# Patient Record
Sex: FEMALE | Race: WHITE | Employment: OTHER | URBAN - METROPOLITAN AREA
[De-identification: names, ages, dates, MRNs, and addresses within clinical notes are randomized per-mention and may not be internally consistent; named-entity substitution may affect disease eponyms.]

---

## 2017-01-18 ENCOUNTER — TELEPHONE (OUTPATIENT)
Dept: FAMILY MEDICINE CLINIC | Age: 77
End: 2017-01-18

## 2017-01-18 ENCOUNTER — CARE COORDINATION (OUTPATIENT)
Dept: CARE COORDINATION | Age: 77
End: 2017-01-18

## 2017-01-18 DIAGNOSIS — Z79.4 TYPE 2 DIABETES MELLITUS WITH OTHER NEUROLOGIC COMPLICATION, WITH LONG-TERM CURRENT USE OF INSULIN (HCC): Primary | ICD-10-CM

## 2017-01-18 DIAGNOSIS — E11.49 TYPE 2 DIABETES MELLITUS WITH OTHER NEUROLOGIC COMPLICATION, WITH LONG-TERM CURRENT USE OF INSULIN (HCC): Primary | ICD-10-CM

## 2017-01-31 DIAGNOSIS — E11.49 TYPE 2 DIABETES MELLITUS WITH OTHER DIABETIC NEUROLOGICAL COMPLICATION (HCC): ICD-10-CM

## 2017-02-16 ENCOUNTER — CARE COORDINATION (OUTPATIENT)
Dept: CARE COORDINATION | Age: 77
End: 2017-02-16

## 2017-02-21 ENCOUNTER — TELEPHONE (OUTPATIENT)
Dept: FAMILY MEDICINE CLINIC | Age: 77
End: 2017-02-21

## 2017-03-02 ENCOUNTER — OFFICE VISIT (OUTPATIENT)
Dept: FAMILY MEDICINE CLINIC | Age: 77
End: 2017-03-02

## 2017-03-02 VITALS
HEIGHT: 64 IN | DIASTOLIC BLOOD PRESSURE: 76 MMHG | WEIGHT: 220.8 LBS | RESPIRATION RATE: 16 BRPM | SYSTOLIC BLOOD PRESSURE: 136 MMHG | OXYGEN SATURATION: 96 % | HEART RATE: 76 BPM | BODY MASS INDEX: 37.69 KG/M2

## 2017-03-02 DIAGNOSIS — Z51.81 MEDICATION MONITORING ENCOUNTER: ICD-10-CM

## 2017-03-02 DIAGNOSIS — Z79.4 TYPE 2 DIABETES MELLITUS WITH OTHER NEUROLOGIC COMPLICATION, WITH LONG-TERM CURRENT USE OF INSULIN (HCC): ICD-10-CM

## 2017-03-02 DIAGNOSIS — I69.320 APHASIA AS LATE EFFECT OF CEREBROVASCULAR ACCIDENT: ICD-10-CM

## 2017-03-02 DIAGNOSIS — E11.49 TYPE 2 DIABETES MELLITUS WITH OTHER NEUROLOGIC COMPLICATION, WITH LONG-TERM CURRENT USE OF INSULIN (HCC): ICD-10-CM

## 2017-03-02 DIAGNOSIS — M15.9 PRIMARY OSTEOARTHRITIS INVOLVING MULTIPLE JOINTS: ICD-10-CM

## 2017-03-02 DIAGNOSIS — I69.351 HEMIPLEGIA AND HEMIPARESIS FOLLOWING CEREBRAL INFARCTION AFFECTING RIGHT DOMINANT SIDE (HCC): ICD-10-CM

## 2017-03-02 DIAGNOSIS — I10 ESSENTIAL HYPERTENSION: Primary | ICD-10-CM

## 2017-03-02 DIAGNOSIS — N39.41 URGE INCONTINENCE OF URINE: ICD-10-CM

## 2017-03-02 DIAGNOSIS — N32.81 OAB (OVERACTIVE BLADDER): ICD-10-CM

## 2017-03-02 DIAGNOSIS — K21.9 GASTROESOPHAGEAL REFLUX DISEASE WITHOUT ESOPHAGITIS: ICD-10-CM

## 2017-03-02 DIAGNOSIS — E78.2 MIXED HYPERLIPIDEMIA: ICD-10-CM

## 2017-03-02 PROCEDURE — G8427 DOCREV CUR MEDS BY ELIG CLIN: HCPCS | Performed by: FAMILY MEDICINE

## 2017-03-02 PROCEDURE — G8417 CALC BMI ABV UP PARAM F/U: HCPCS | Performed by: FAMILY MEDICINE

## 2017-03-02 PROCEDURE — 1090F PRES/ABSN URINE INCON ASSESS: CPT | Performed by: FAMILY MEDICINE

## 2017-03-02 PROCEDURE — G8400 PT W/DXA NO RESULTS DOC: HCPCS | Performed by: FAMILY MEDICINE

## 2017-03-02 PROCEDURE — 3045F PR MOST RECENT HEMOGLOBIN A1C LEVEL 7.0-9.0%: CPT | Performed by: FAMILY MEDICINE

## 2017-03-02 PROCEDURE — 4040F PNEUMOC VAC/ADMIN/RCVD: CPT | Performed by: FAMILY MEDICINE

## 2017-03-02 PROCEDURE — 3014F SCREEN MAMMO DOC REV: CPT | Performed by: FAMILY MEDICINE

## 2017-03-02 PROCEDURE — G8484 FLU IMMUNIZE NO ADMIN: HCPCS | Performed by: FAMILY MEDICINE

## 2017-03-02 PROCEDURE — 1123F ACP DISCUSS/DSCN MKR DOCD: CPT | Performed by: FAMILY MEDICINE

## 2017-03-02 PROCEDURE — 0509F URINE INCON PLAN DOCD: CPT | Performed by: FAMILY MEDICINE

## 2017-03-02 PROCEDURE — 99214 OFFICE O/P EST MOD 30 MIN: CPT | Performed by: FAMILY MEDICINE

## 2017-03-02 PROCEDURE — 1036F TOBACCO NON-USER: CPT | Performed by: FAMILY MEDICINE

## 2017-03-02 PROCEDURE — 3017F COLORECTAL CA SCREEN DOC REV: CPT | Performed by: FAMILY MEDICINE

## 2017-03-02 ASSESSMENT — ENCOUNTER SYMPTOMS
RESPIRATORY NEGATIVE: 1
COUGH: 0

## 2017-03-09 ENCOUNTER — TELEPHONE (OUTPATIENT)
Dept: FAMILY MEDICINE CLINIC | Age: 77
End: 2017-03-09

## 2017-03-09 ENCOUNTER — ANTI-COAG VISIT (OUTPATIENT)
Dept: FAMILY MEDICINE CLINIC | Age: 77
End: 2017-03-09

## 2017-03-22 ENCOUNTER — CARE COORDINATION (OUTPATIENT)
Dept: CARE COORDINATION | Age: 77
End: 2017-03-22

## 2017-04-12 ENCOUNTER — ANTI-COAG VISIT (OUTPATIENT)
Dept: FAMILY MEDICINE CLINIC | Age: 77
End: 2017-04-12

## 2017-04-20 ENCOUNTER — TELEPHONE (OUTPATIENT)
Dept: FAMILY MEDICINE CLINIC | Age: 77
End: 2017-04-20

## 2017-04-28 ENCOUNTER — TELEPHONE (OUTPATIENT)
Dept: FAMILY MEDICINE CLINIC | Age: 77
End: 2017-04-28

## 2017-04-28 ENCOUNTER — CARE COORDINATION (OUTPATIENT)
Dept: OTHER | Facility: CLINIC | Age: 77
End: 2017-04-28

## 2017-05-01 ENCOUNTER — CARE COORDINATION (OUTPATIENT)
Dept: CARE COORDINATION | Age: 77
End: 2017-05-01

## 2017-06-20 ENCOUNTER — CARE COORDINATION (OUTPATIENT)
Dept: CARE COORDINATION | Age: 77
End: 2017-06-20

## 2017-07-14 ENCOUNTER — ANTI-COAG VISIT (OUTPATIENT)
Dept: FAMILY MEDICINE CLINIC | Age: 77
End: 2017-07-14

## 2017-07-18 ENCOUNTER — OFFICE VISIT (OUTPATIENT)
Dept: FAMILY MEDICINE CLINIC | Age: 77
End: 2017-07-18
Payer: MEDICARE

## 2017-07-18 VITALS
RESPIRATION RATE: 16 BRPM | BODY MASS INDEX: 36.82 KG/M2 | OXYGEN SATURATION: 96 % | HEIGHT: 65 IN | WEIGHT: 221 LBS | HEART RATE: 79 BPM | DIASTOLIC BLOOD PRESSURE: 64 MMHG | SYSTOLIC BLOOD PRESSURE: 138 MMHG

## 2017-07-18 DIAGNOSIS — I10 ESSENTIAL HYPERTENSION: Primary | ICD-10-CM

## 2017-07-18 DIAGNOSIS — Z12.31 ENCOUNTER FOR SCREENING MAMMOGRAM FOR BREAST CANCER: ICD-10-CM

## 2017-07-18 DIAGNOSIS — I69.320 APHASIA AS LATE EFFECT OF CEREBROVASCULAR ACCIDENT: ICD-10-CM

## 2017-07-18 DIAGNOSIS — Z79.4 TYPE 2 DIABETES MELLITUS WITH OTHER NEUROLOGIC COMPLICATION, WITH LONG-TERM CURRENT USE OF INSULIN (HCC): ICD-10-CM

## 2017-07-18 DIAGNOSIS — Z23 NEED FOR VACCINATION WITH 13-POLYVALENT PNEUMOCOCCAL CONJUGATE VACCINE: ICD-10-CM

## 2017-07-18 DIAGNOSIS — E11.49 TYPE 2 DIABETES MELLITUS WITH OTHER NEUROLOGIC COMPLICATION, WITH LONG-TERM CURRENT USE OF INSULIN (HCC): ICD-10-CM

## 2017-07-18 DIAGNOSIS — E78.2 MIXED HYPERLIPIDEMIA: ICD-10-CM

## 2017-07-18 DIAGNOSIS — G81.91 HEMIPLEGIA AFFECTING RIGHT DOMINANT SIDE (HCC): ICD-10-CM

## 2017-07-18 PROCEDURE — 1123F ACP DISCUSS/DSCN MKR DOCD: CPT | Performed by: FAMILY MEDICINE

## 2017-07-18 PROCEDURE — 4040F PNEUMOC VAC/ADMIN/RCVD: CPT | Performed by: FAMILY MEDICINE

## 2017-07-18 PROCEDURE — G8400 PT W/DXA NO RESULTS DOC: HCPCS | Performed by: FAMILY MEDICINE

## 2017-07-18 PROCEDURE — G8417 CALC BMI ABV UP PARAM F/U: HCPCS | Performed by: FAMILY MEDICINE

## 2017-07-18 PROCEDURE — 3017F COLORECTAL CA SCREEN DOC REV: CPT | Performed by: FAMILY MEDICINE

## 2017-07-18 PROCEDURE — 1036F TOBACCO NON-USER: CPT | Performed by: FAMILY MEDICINE

## 2017-07-18 PROCEDURE — G8427 DOCREV CUR MEDS BY ELIG CLIN: HCPCS | Performed by: FAMILY MEDICINE

## 2017-07-18 PROCEDURE — G0009 ADMIN PNEUMOCOCCAL VACCINE: HCPCS | Performed by: FAMILY MEDICINE

## 2017-07-18 PROCEDURE — 90670 PCV13 VACCINE IM: CPT | Performed by: FAMILY MEDICINE

## 2017-07-18 PROCEDURE — 3014F SCREEN MAMMO DOC REV: CPT | Performed by: FAMILY MEDICINE

## 2017-07-18 PROCEDURE — 1090F PRES/ABSN URINE INCON ASSESS: CPT | Performed by: FAMILY MEDICINE

## 2017-07-18 PROCEDURE — 99214 OFFICE O/P EST MOD 30 MIN: CPT | Performed by: FAMILY MEDICINE

## 2017-07-18 PROCEDURE — 3046F HEMOGLOBIN A1C LEVEL >9.0%: CPT | Performed by: FAMILY MEDICINE

## 2017-07-18 ASSESSMENT — ENCOUNTER SYMPTOMS
RESPIRATORY NEGATIVE: 1
SHORTNESS OF BREATH: 0
GASTROINTESTINAL NEGATIVE: 1
COUGH: 0
ALLERGIC/IMMUNOLOGIC NEGATIVE: 1
ABDOMINAL PAIN: 0

## 2017-07-19 ENCOUNTER — CARE COORDINATION (OUTPATIENT)
Dept: CARE COORDINATION | Age: 77
End: 2017-07-19

## 2017-07-25 ENCOUNTER — HOSPITAL ENCOUNTER (OUTPATIENT)
Dept: WOMENS IMAGING | Age: 77
Discharge: HOME | End: 2017-07-25
Payer: MEDICARE

## 2017-07-25 ENCOUNTER — HOSPITAL ENCOUNTER (OUTPATIENT)
Age: 77
End: 2017-07-25
Payer: MEDICARE

## 2017-07-25 ENCOUNTER — HOSPITAL ENCOUNTER (OUTPATIENT)
Age: 77
Discharge: HOME | End: 2017-07-25
Payer: MEDICARE

## 2017-07-25 DIAGNOSIS — Z12.31 ENCOUNTER FOR SCREENING MAMMOGRAM FOR BREAST CANCER: ICD-10-CM

## 2017-07-25 DIAGNOSIS — Z79.4 TYPE 2 DIABETES MELLITUS WITH OTHER NEUROLOGIC COMPLICATION, WITH LONG-TERM CURRENT USE OF INSULIN (HCC): ICD-10-CM

## 2017-07-25 DIAGNOSIS — E11.49 TYPE 2 DIABETES MELLITUS WITH OTHER NEUROLOGIC COMPLICATION, WITH LONG-TERM CURRENT USE OF INSULIN (HCC): ICD-10-CM

## 2017-07-25 LAB
CREATININE, URINE: 106.6 MG/DL
MICROALBUMIN UR-MCNC: 1.58 MG/DL
MICROALBUMIN/CREAT UR-RTO: 15 MG/G (ref 0–30)

## 2017-07-25 PROCEDURE — G0202 SCR MAMMO BI INCL CAD: HCPCS

## 2017-07-25 PROCEDURE — 82043 UR ALBUMIN QUANTITATIVE: CPT

## 2017-08-07 RX ORDER — LISINOPRIL 40 MG/1
TABLET ORAL
Qty: 90 TABLET | Refills: 3 | Status: SHIPPED | OUTPATIENT
Start: 2017-08-07 | End: 2018-11-26 | Stop reason: SDUPTHER

## 2017-08-07 RX ORDER — AMLODIPINE BESYLATE AND BENAZEPRIL HYDROCHLORIDE 10; 20 MG/1; MG/1
CAPSULE ORAL
Qty: 90 CAPSULE | Refills: 3 | Status: SHIPPED | OUTPATIENT
Start: 2017-08-07 | End: 2018-10-31 | Stop reason: SDUPTHER

## 2017-08-07 RX ORDER — OXYBUTYNIN CHLORIDE 10 MG/1
TABLET, EXTENDED RELEASE ORAL
Qty: 90 TABLET | Refills: 3 | Status: SHIPPED | OUTPATIENT
Start: 2017-08-07 | End: 2018-11-26 | Stop reason: SDUPTHER

## 2017-08-07 RX ORDER — PANTOPRAZOLE SODIUM 40 MG/1
TABLET, DELAYED RELEASE ORAL
Qty: 90 TABLET | Refills: 3 | Status: SHIPPED | OUTPATIENT
Start: 2017-08-07 | End: 2018-11-26 | Stop reason: SDUPTHER

## 2017-08-07 RX ORDER — SITAGLIPTIN 100 MG/1
TABLET, FILM COATED ORAL
Qty: 90 TABLET | Refills: 3 | Status: SHIPPED | OUTPATIENT
Start: 2017-08-07 | End: 2018-11-26 | Stop reason: SDUPTHER

## 2017-08-09 ENCOUNTER — HOSPITAL ENCOUNTER (OUTPATIENT)
Age: 77
Discharge: HOME | End: 2017-08-09
Payer: MEDICARE

## 2017-08-09 ENCOUNTER — TELEPHONE (OUTPATIENT)
Dept: FAMILY MEDICINE CLINIC | Age: 77
End: 2017-08-09

## 2017-08-09 LAB — INR BLD: 2.19 (ref 0.85–1.13)

## 2017-08-09 PROCEDURE — 36415 COLL VENOUS BLD VENIPUNCTURE: CPT

## 2017-08-09 PROCEDURE — 85610 PROTHROMBIN TIME: CPT

## 2017-08-11 ENCOUNTER — CARE COORDINATION (OUTPATIENT)
Dept: CARE COORDINATION | Age: 77
End: 2017-08-11

## 2017-08-14 ENCOUNTER — ANTI-COAG VISIT (OUTPATIENT)
Dept: FAMILY MEDICINE CLINIC | Age: 77
End: 2017-08-14

## 2017-08-14 RX ORDER — CITALOPRAM 10 MG/1
TABLET ORAL
Qty: 90 TABLET | Refills: 3 | Status: SHIPPED | OUTPATIENT
Start: 2017-08-14 | End: 2018-11-26 | Stop reason: SDUPTHER

## 2017-09-13 ENCOUNTER — HOSPITAL ENCOUNTER (OUTPATIENT)
Age: 77
Discharge: HOME | End: 2017-09-13
Payer: MEDICARE

## 2017-09-13 LAB — INR BLD: 2.19 (ref 0.85–1.13)

## 2017-09-13 PROCEDURE — 36415 COLL VENOUS BLD VENIPUNCTURE: CPT

## 2017-09-13 PROCEDURE — 85610 PROTHROMBIN TIME: CPT

## 2017-09-20 ENCOUNTER — CARE COORDINATION (OUTPATIENT)
Dept: CARE COORDINATION | Age: 77
End: 2017-09-20

## 2017-09-20 DIAGNOSIS — E11.69 DIABETES MELLITUS TYPE 2 IN OBESE (HCC): Primary | ICD-10-CM

## 2017-09-20 DIAGNOSIS — I10 ESSENTIAL HYPERTENSION: ICD-10-CM

## 2017-09-20 DIAGNOSIS — E66.9 DIABETES MELLITUS TYPE 2 IN OBESE (HCC): Primary | ICD-10-CM

## 2017-09-20 PROBLEM — F33.41 RECURRENT MAJOR DEPRESSIVE DISORDER, IN PARTIAL REMISSION (HCC): Status: ACTIVE | Noted: 2017-09-20

## 2017-09-25 ENCOUNTER — HOSPITAL ENCOUNTER (OUTPATIENT)
Age: 77
Discharge: HOME | End: 2017-09-25
Payer: MEDICARE

## 2017-09-25 DIAGNOSIS — E66.9 DIABETES MELLITUS TYPE 2 IN OBESE (HCC): ICD-10-CM

## 2017-09-25 DIAGNOSIS — E11.69 DIABETES MELLITUS TYPE 2 IN OBESE (HCC): ICD-10-CM

## 2017-09-25 DIAGNOSIS — I10 ESSENTIAL HYPERTENSION: ICD-10-CM

## 2017-09-25 LAB
ALBUMIN SERPL-MCNC: 3.7 G/DL (ref 3.5–5.1)
ALP BLD-CCNC: 52 U/L (ref 38–126)
ALT SERPL-CCNC: 18 U/L (ref 11–66)
ANION GAP SERPL CALCULATED.3IONS-SCNC: 14 MEQ/L (ref 8–16)
AST SERPL-CCNC: 24 U/L (ref 5–40)
AVERAGE GLUCOSE: 180 MG/DL (ref 70–126)
BILIRUB SERPL-MCNC: 0.3 MG/DL (ref 0.3–1.2)
BUN BLDV-MCNC: 22 MG/DL (ref 7–22)
CALCIUM SERPL-MCNC: 9.4 MG/DL (ref 8.5–10.5)
CHLORIDE BLD-SCNC: 98 MEQ/L (ref 98–111)
CO2: 26 MEQ/L (ref 23–33)
CREAT SERPL-MCNC: 1 MG/DL (ref 0.4–1.2)
GFR SERPL CREATININE-BSD FRML MDRD: 54 ML/MIN/1.73M2
GLUCOSE BLD-MCNC: 205 MG/DL (ref 70–108)
HBA1C MFR BLD: 8 % (ref 4.4–6.4)
POTASSIUM SERPL-SCNC: 4.6 MEQ/L (ref 3.5–5.2)
SODIUM BLD-SCNC: 138 MEQ/L (ref 135–145)
TOTAL PROTEIN: 7.2 G/DL (ref 6.1–8)

## 2017-09-25 PROCEDURE — 83036 HEMOGLOBIN GLYCOSYLATED A1C: CPT

## 2017-09-25 PROCEDURE — 36415 COLL VENOUS BLD VENIPUNCTURE: CPT

## 2017-09-25 PROCEDURE — 80053 COMPREHEN METABOLIC PANEL: CPT

## 2017-10-09 NOTE — TELEPHONE ENCOUNTER
Cachorro Mendiola called requesting a refill on the following medications:  Requested Prescriptions     Pending Prescriptions Disp Refills    metFORMIN (GLUCOPHAGE) 500 MG tablet 60 tablet      Sig: Take 1 tablet by mouth daily (with breakfast)     Pharmacy verified:  .[email or web address]      Date of last visit: 07/18/17  Date of next visit (if applicable): 65/88/2984        Date of last fill and quantity (to be completed by clinical staff)  Pharmacy name:   [facility]

## 2017-10-23 RX ORDER — WARFARIN SODIUM 5 MG/1
TABLET ORAL
Qty: 90 TABLET | Refills: 3 | Status: SHIPPED | OUTPATIENT
Start: 2017-10-23 | End: 2018-10-25

## 2017-10-24 ENCOUNTER — OFFICE VISIT (OUTPATIENT)
Dept: FAMILY MEDICINE CLINIC | Age: 77
End: 2017-10-24
Payer: MEDICARE

## 2017-10-24 VITALS
HEART RATE: 88 BPM | RESPIRATION RATE: 16 BRPM | BODY MASS INDEX: 36.82 KG/M2 | DIASTOLIC BLOOD PRESSURE: 80 MMHG | HEIGHT: 65 IN | WEIGHT: 221 LBS | SYSTOLIC BLOOD PRESSURE: 136 MMHG

## 2017-10-24 DIAGNOSIS — K21.9 GASTROESOPHAGEAL REFLUX DISEASE WITHOUT ESOPHAGITIS: ICD-10-CM

## 2017-10-24 DIAGNOSIS — N32.81 OAB (OVERACTIVE BLADDER): ICD-10-CM

## 2017-10-24 DIAGNOSIS — Z23 NEEDS FLU SHOT: ICD-10-CM

## 2017-10-24 DIAGNOSIS — D49.2 SKIN NEOPLASM: ICD-10-CM

## 2017-10-24 DIAGNOSIS — Z51.81 MEDICATION MONITORING ENCOUNTER: ICD-10-CM

## 2017-10-24 DIAGNOSIS — Z86.73 S/P STROKE DUE TO CEREBROVASCULAR DISEASE: ICD-10-CM

## 2017-10-24 DIAGNOSIS — E11.69 DIABETES MELLITUS TYPE 2 IN OBESE (HCC): ICD-10-CM

## 2017-10-24 DIAGNOSIS — F32.1 MODERATE MAJOR DEPRESSION, SINGLE EPISODE (HCC): ICD-10-CM

## 2017-10-24 DIAGNOSIS — I10 ESSENTIAL HYPERTENSION: Primary | ICD-10-CM

## 2017-10-24 DIAGNOSIS — E66.9 DIABETES MELLITUS TYPE 2 IN OBESE (HCC): ICD-10-CM

## 2017-10-24 DIAGNOSIS — I69.320 APHASIA AS LATE EFFECT OF CEREBROVASCULAR ACCIDENT: ICD-10-CM

## 2017-10-24 DIAGNOSIS — E78.2 MIXED HYPERLIPIDEMIA: ICD-10-CM

## 2017-10-24 DIAGNOSIS — M15.9 PRIMARY OSTEOARTHRITIS INVOLVING MULTIPLE JOINTS: ICD-10-CM

## 2017-10-24 PROCEDURE — 3017F COLORECTAL CA SCREEN DOC REV: CPT | Performed by: FAMILY MEDICINE

## 2017-10-24 PROCEDURE — G8417 CALC BMI ABV UP PARAM F/U: HCPCS | Performed by: FAMILY MEDICINE

## 2017-10-24 PROCEDURE — 3045F PR MOST RECENT HEMOGLOBIN A1C LEVEL 7.0-9.0%: CPT | Performed by: FAMILY MEDICINE

## 2017-10-24 PROCEDURE — G8484 FLU IMMUNIZE NO ADMIN: HCPCS | Performed by: FAMILY MEDICINE

## 2017-10-24 PROCEDURE — 1036F TOBACCO NON-USER: CPT | Performed by: FAMILY MEDICINE

## 2017-10-24 PROCEDURE — 4040F PNEUMOC VAC/ADMIN/RCVD: CPT | Performed by: FAMILY MEDICINE

## 2017-10-24 PROCEDURE — 1090F PRES/ABSN URINE INCON ASSESS: CPT | Performed by: FAMILY MEDICINE

## 2017-10-24 PROCEDURE — G8400 PT W/DXA NO RESULTS DOC: HCPCS | Performed by: FAMILY MEDICINE

## 2017-10-24 PROCEDURE — G8427 DOCREV CUR MEDS BY ELIG CLIN: HCPCS | Performed by: FAMILY MEDICINE

## 2017-10-24 PROCEDURE — 99214 OFFICE O/P EST MOD 30 MIN: CPT | Performed by: FAMILY MEDICINE

## 2017-10-24 PROCEDURE — G0008 ADMIN INFLUENZA VIRUS VAC: HCPCS | Performed by: FAMILY MEDICINE

## 2017-10-24 PROCEDURE — 1123F ACP DISCUSS/DSCN MKR DOCD: CPT | Performed by: FAMILY MEDICINE

## 2017-10-24 PROCEDURE — 90662 IIV NO PRSV INCREASED AG IM: CPT | Performed by: FAMILY MEDICINE

## 2017-10-24 RX ORDER — SIMVASTATIN 20 MG
TABLET ORAL
Qty: 90 TABLET | Refills: 3 | Status: SHIPPED | OUTPATIENT
Start: 2017-10-24 | End: 2018-11-26 | Stop reason: SDUPTHER

## 2017-10-24 RX ORDER — VITAMIN E 268 MG
400 CAPSULE ORAL DAILY
COMMUNITY

## 2017-10-24 ASSESSMENT — ENCOUNTER SYMPTOMS
GASTROINTESTINAL NEGATIVE: 1
SHORTNESS OF BREATH: 0
RESPIRATORY NEGATIVE: 1
COUGH: 0

## 2017-10-24 NOTE — PROGRESS NOTES
Subjective:      Patient ID: Kamaljit Rodriguez is a 78 y.o. female. HPI  Follow up of chronic conditions. Encounter Diagnoses   Name Primary?  Essential hypertension Yes    Mixed hyperlipidemia     Primary osteoarthritis involving multiple joints     Gastroesophageal reflux disease without esophagitis     S/P stroke due to cerebrovascular disease     Aphasia as late effect of cerebrovascular accident     OAB (overactive bladder)     Diabetes mellitus type 2 in obese ([address]     Medication monitoring encounter     Needs flu shot     Moderate major depression, single episode ([address]      HTN is stable with current medications. Pt has no medication side effects nor orthostatic symptoms. BP Readings from Last 3 Encounters:   10/24/17 136/80   07/18/17 138/64   03/02/17 136/76     Lipid values were reviewed with Mio Bray and her  who is her caregiver. Cholesterol, Total (mg/dl)   Date Value   05/22/2017 135     HDL (mg/dl)   Date Value   05/22/2017 31     Triglycerides (mg/dl)   Date Value   05/22/2017 298 (H)     Lab Results   Component Value Date    LDLCALC 44 05/22/2017     DM2 is stable without any symptoms of hypoglycemia. The need to tighten down on her diet was discussed with her . I do know they tend to be snacks and pastries on weekends so I stressed those need to be decreased or stopped completely. Lab Results   Component Value Date    LABA1C 8.0 (H) 09/25/2017    LABA1C 7.6 (H) 02/08/2017    LABA1C 8.1 (H) 10/13/2016     Lab Results   Component Value Date    LABMICR 1.58 07/25/2017    LDLCALC 44 05/22/2017    CREATININE 1.0 09/25/2017     She has a skin lesion on her left forearm has been present for at least 2 months and has not decreased in size. It has remained scabbed but has not been bleeding. I will bring her back for shave biopsy for tissue diagnosis. There is been no change in her aphasia and post stroke symptoms. The right side hemiplegia is unchanged.   She continues to be wheelchair bound. Her depression symptoms seem to be well controlled currently. Her  states that she tends to be emotionally stable and at times upbeat. The rest of this patient's conditions are stable. Past medical and surgical hx reviewed. Past Medical History:   Diagnosis Date    CAD (coronary artery disease)     Cerebrovascular disease     Depression     Hyperlipidemia     Hypertension     Self-care deficit for dressing and grooming     Self-care deficit for medication administration 2010    Type II or unspecified type diabetes mellitus without mention of complication, not stated as uncontrolled 2010    Unspecified cerebral artery occlusion with cerebral infarction      Past Surgical History:   Procedure Laterality Date    CARDIAC CATHETERIZATION  2004    CHOLECYSTECTOMY  1965    COLONOSCOPY      HEMORRHOID SURGERY  2001    JOINT REPLACEMENT  2008    Lt Total Knee    JOINT REPLACEMENT  2009    Rt Total Knee     Portions of this note were completed with a voice recording program.  Efforts were made to edit the dictations but occasionally words are mis-transcribed. Review of Systems   HENT: Negative. Respiratory: Negative. Negative for cough and shortness of breath. Cardiovascular: Negative. Negative for chest pain, palpitations and leg swelling. Gastrointestinal: Negative. Genitourinary: Negative. Musculoskeletal: Positive for gait problem. Skin: Negative. Neurological: Positive for speech difficulty, weakness and numbness. Negative for dizziness, light-headedness and headaches. Hematological: Negative. Psychiatric/Behavioral: Negative for dysphoric mood. All other systems reviewed and are negative. Objective:   Physical Exam   Constitutional: She is oriented to person, place, and time. She appears well-developed and well-nourished.    HENT:   Right Ear: Tympanic membrane, external ear and ear canal normal.   Left Ear: Tympanic membrane, external ear and ear canal normal.   Nose: Nose normal. No mucosal edema. Mouth/Throat: Oropharynx is clear and moist.   Eyes: Conjunctivae are normal.   Neck: No thyromegaly present. Cardiovascular: Normal rate, regular rhythm and normal heart sounds. Exam reveals no gallop. No murmur heard. Pulmonary/Chest: Effort normal and breath sounds normal. She has no wheezes. She has no rales. Abdominal: Bowel sounds are normal.   Musculoskeletal: She exhibits no edema. Lymphadenopathy:     She has no cervical adenopathy. Neurological: She is alert and oriented to person, place, and time. Skin: Skin is warm and dry. No rash noted. Psychiatric: She has a normal mood and affect. Nursing note and vitals reviewed. Assessment:      1. Essential hypertension     2. Mixed hyperlipidemia  simvastatin (ZOCOR) 20 MG tablet   3. Primary osteoarthritis involving multiple joints     4. Gastroesophageal reflux disease without esophagitis     5. S/P stroke due to cerebrovascular disease     6. Aphasia as late effect of cerebrovascular accident     7. OAB (overactive bladder)     8. Diabetes mellitus type 2 in obese ([facility]     9. Medication monitoring encounter     10. Needs flu shot  INFLUENZA, HIGH DOSE, 65 YRS +, IM, PF, PREFILL SYR, 0.5ML (FLUZONE HD)   11.  Moderate major depression, single episode ([address]             Plan:      Orders Placed This Encounter   Procedures    INFLUENZA, HIGH DOSE, 65 YRS +, IM, PF, PREFILL SYR, 0.5ML (FLUZONE HD)     Medications Discontinued During This Encounter   Medication Reason    Ascorbic Acid (VITAMIN C) 500 MG CHEW Therapy completed    simvastatin (ZOCOR) 20 MG tablet Reorder     Current Outpatient Prescriptions   Medication Sig Dispense Refill    vitamin E 400 UNIT capsule Take 400 Units by mouth daily      simvastatin (ZOCOR) 20 MG tablet TAKE ONE TABLET BY MOUTH NIGHTLY 90 tablet 3    warfarin (COUMADIN) 5 MG tablet TAKE ONE TABLET BY MOUTH ONCE DAILY 90 tablet 3    metFORMIN (GLUCOPHAGE) 500 MG tablet Take 1 tablet by mouth daily (with breakfast) 60 tablet 5    citalopram (CELEXA) 10 MG tablet TAKE ONE TABLET BY MOUTH ONCE DAILY 90 tablet 3    JANUVIA 100 MG tablet TAKE ONE TABLET BY MOUTH ONCE DAILY 90 tablet 3    amLODIPine-benazepril (LOTREL) 10-20 MG per capsule TAKE ONE CAPSULE BY MOUTH ONCE DAILY FOR HIGH BLOOD PRESSURE 90 capsule 3    pantoprazole (PROTONIX) 40 MG tablet TAKE ONE TABLET BY MOUTH ONCE DAILY 90 tablet 3    oxybutynin (DITROPAN-XL) 10 MG extended release tablet TAKE ONE TABLET BY MOUTH ONCE DAILY 90 tablet 3    lisinopril (PRINIVIL;ZESTRIL) 40 MG tablet TAKE ONE TABLET BY MOUTH ONCE DAILY 90 tablet 3    glimepiride (AMARYL) 4 MG tablet TAKE TWO TABLETS BY MOUTH ONCE DAILY IN THE MORNING BEFORE BREAKFAST 180 tablet 2    insulin glargine (LANTUS SOLOSTAR) 100 UNIT/ML injection Inject 25 Units into the skin nightly Indications: now giving her 26 units       warfarin (COUMADIN) 4 MG tablet Take as directed per pro time INR results. 100 tablet 3    Insulin Pen Needle 31G X 8 MM MISC Inject 1 each into the skin daily   RELION brand requested. 100 each 3    calcium carbonate (OSCAL) 500 MG TABS tablet Take 500 mg by mouth daily      ferrous sulfate 325 (65 FE) MG tablet Take 325 mg by mouth daily (with breakfast) Indications: gives 2-3 x per week       glucose blood VI test strips (ONE TOUCH TEST STRIPS) strip Test blood sugar with One Touch Ultra 2 QID and as needed. 100 strip 5    aspirin EC 81 MG EC tablet Take 81 mg by mouth daily. Indications: Cardiovascular Risk Reduction      Insulin Pen Needle (PEN NEEDLES) 29G X 12MM MISC by Does not apply route daily.  Glucagon HCl, rDNA, (GLUCAGEN HYPOKIT IJ) Inject 1 mg as directed as needed. Indications: Extremely Low Blood Sugar       No current facility-administered medications for this visit. Continue present medications. Continue to watch diet.   Return for shave biopsy of left forearm lesion. Discussed use, benefit, and side effects of prescribed medications. Barriers to compliance discussed. All patient questions answered. Pt voiced understanding.

## 2017-10-24 NOTE — PATIENT INSTRUCTIONS
You may receive a survey about your visit with us today. The feedback from our patients helps us identify what is working well and where the service to all patients can be enhanced. Thank you! Patient Education        Continue present medications. Continue to watch diet. Learning About Diabetes Food Guidelines  Your Care Instructions  Meal planning is important to manage diabetes. It helps keep your blood sugar at a target level (which you set with your doctor). You don't have to eat special foods. You can eat what your family eats, including sweets once in a while. But you do have to pay attention to how often you eat and how much you eat of certain foods. You may want to work with a dietitian or a certified diabetes educator (CDE) to help you plan meals and snacks. A dietitian or CDE can also help you lose weight if that is one of your goals. What should you know about eating carbs? Managing the amount of carbohydrate (carbs) you eat is an important part of healthy meals when you have diabetes. Carbohydrate is found in many foods. · Learn which foods have carbs. And learn the amounts of carbs in different foods. ¨ Bread, cereal, pasta, and rice have about 15 grams of carbs in a serving. A serving is 1 slice of bread (1 ounce), ½ cup of cooked cereal, or 1/3 cup of cooked pasta or rice. ¨ Fruits have 15 grams of carbs in a serving. A serving is 1 small fresh fruit, such as an apple or orange; ½ of a banana; ½ cup of cooked or canned fruit; ½ cup of fruit juice; 1 cup of melon or raspberries; or 2 tablespoons of dried fruit. ¨ Milk and no-sugar-added yogurt have 15 grams of carbs in a serving. A serving is 1 cup of milk or 2/3 cup of no-sugar-added yogurt. ¨ Starchy vegetables have 15 grams of carbs in a serving. A serving is ½ cup of mashed potatoes or sweet potato; 1 cup winter squash; ½ of a small baked potato; ½ cup of cooked beans; or ½ cup cooked corn or green peas.   · Learn how much carbs to eat each day and at each meal. A dietitian or CDE can teach you how to keep track of the amount of carbs you eat. This is called carbohydrate counting. · If you are not sure how to count carbohydrate grams, use the Plate Method to plan meals. It is a good, quick way to make sure that you have a balanced meal. It also helps you spread carbs throughout the day. ¨ Divide your plate by types of foods. Put non-starchy vegetables on half the plate, meat or other protein food on one-quarter of the plate, and a grain or starchy vegetable in the final quarter of the plate. To this you can add a small piece of fruit and 1 cup of milk or yogurt, depending on how many carbs you are supposed to eat at a meal.  · Try to eat about the same amount of carbs at each meal. Do not \"save up\" your daily allowance of carbs to eat at one meal.  · Proteins have very little or no carbs per serving. Examples of proteins are beef, chicken, turkey, fish, eggs, tofu, cheese, cottage cheese, and peanut butter. A serving size of meat is 3 ounces, which is about the size of a deck of cards. Examples of meat substitute serving sizes (equal to 1 ounce of meat) are 1/4 cup of cottage cheese, 1 egg, 1 tablespoon of peanut butter, and ½ cup of tofu. How can you eat out and still eat healthy? · Learn to estimate the serving sizes of foods that have carbohydrate. If you measure food at home, it will be easier to estimate the amount in a serving of restaurant food. · If the meal you order has too much carbohydrate (such as potatoes, corn, or baked beans), ask to have a low-carbohydrate food instead. Ask for a salad or green vegetables. · If you use insulin, check your blood sugar before and after eating out to help you plan how much to eat in the future. · If you eat more carbohydrate at a meal than you had planned, take a walk or do other exercise. This will help lower your blood sugar. What else should you know?   · Limit saturated fat, such as the fat from meat and dairy products. This is a healthy choice because people who have diabetes are at higher risk of heart disease. So choose lean cuts of meat and nonfat or low-fat dairy products. Use olive or canola oil instead of butter or shortening when cooking. · Don't skip meals. Your blood sugar may drop too low if you skip meals and take insulin or certain medicines for diabetes. · Check with your doctor before you drink alcohol. Alcohol can cause your blood sugar to drop too low. Alcohol can also cause a bad reaction if you take certain diabetes medicines. Follow-up care is a key part of your treatment and safety. Be sure to make and go to all appointments, and call your doctor if you are having problems. It's also a good idea to know your test results and keep a list of the medicines you take. Where can you learn more? Go to https://OfficeDroppepicewKeduo.Movirtu. org and sign in to your [employer] account. Enter F386 in the Kili box to learn more about \"Learning About Diabetes Food Guidelines. \"     If you do not have an account, please click on the \"Sign Up Now\" link. Current as of: March 13, 2017  Content Version: 11.3  © 3394-2607 [employer]. Care instructions adapted under license by [facility] ([facility]). If you have questions about a medical condition or this instruction, always ask your healthcare professional. [place] [address] any warranty or liability for your use of this information. Patient Education        High Blood Pressure: Care Instructions  Your Care Instructions  If your blood pressure is usually above 140/90, you have high blood pressure, or hypertension. That means the top number is 140 or higher or the bottom number is 90 or higher, or both. Despite what a lot of people think, high blood pressure usually doesn't cause headaches or make you feel dizzy or lightheaded. It usually has no symptoms.  But it does increase your risk for heart attack, stroke, and kidney or eye damage. The higher your blood pressure, the more your risk increases. Your doctor will give you a goal for your blood pressure. Your goal will be based on your health and your age. An example of a goal is to keep your blood pressure below 140/90. Lifestyle changes, such as eating healthy and being active, are always important to help lower blood pressure. You might also take medicine to reach your blood pressure goal.  Follow-up care is a key part of your treatment and safety. Be sure to make and go to all appointments, and call your doctor if you are having problems. It's also a good idea to know your test results and keep a list of the medicines you take. How can you care for yourself at home? Medical treatment  · If you stop taking your medicine, your blood pressure will go back up. You may take one or more types of medicine to lower your blood pressure. Be safe with medicines. Take your medicine exactly as prescribed. Call your doctor if you think you are having a problem with your medicine. · Talk to your doctor before you start taking aspirin every day. Aspirin can help certain people lower their risk of a heart attack or stroke. But taking aspirin isn't right for everyone, because it can cause serious bleeding. · See your doctor regularly. You may need to see the doctor more often at first or until your blood pressure comes down. · If you are taking blood pressure medicine, talk to your doctor before you take decongestants or anti-inflammatory medicine, such as ibuprofen. Some of these medicines can raise blood pressure. · Learn how to check your blood pressure at home. Lifestyle changes  · Stay at a healthy weight. This is especially important if you put on weight around the waist. Losing even 10 pounds can help you lower your blood pressure. · If your doctor recommends it, get more exercise. Walking is a good choice.  Bit by bit, increase the amount you walk every day. Try for at least 30 minutes on most days of the week. You also may want to swim, bike, or do other activities. · Avoid or limit alcohol. Talk to your doctor about whether you can drink any alcohol. · Try to limit how much sodium you eat to less than 2,300 milligrams (mg) a day. Your doctor may ask you to try to eat less than 1,500 mg a day. · Eat plenty of fruits (such as bananas and oranges), vegetables, legumes, whole grains, and low-fat dairy products. · Lower the amount of saturated fat in your diet. Saturated fat is found in animal products such as milk, cheese, and meat. Limiting these foods may help you lose weight and also lower your risk for heart disease. · Do not smoke. Smoking increases your risk for heart attack and stroke. If you need help quitting, talk to your doctor about stop-smoking programs and medicines. These can increase your chances of quitting for good. When should you call for help? Call 911 anytime you think you may need emergency care. This may mean having symptoms that suggest that your blood pressure is causing a serious heart or blood vessel problem. Your blood pressure may be over 180/110. For example, call 911 if:  · You have symptoms of a heart attack. These may include:  ¨ Chest pain or pressure, or a strange feeling in the chest.  ¨ Sweating. ¨ Shortness of breath. ¨ Nausea or vomiting. ¨ Pain, pressure, or a strange feeling in the back, neck, jaw, or upper belly or in one or both shoulders or arms. ¨ Lightheadedness or sudden weakness. ¨ A fast or irregular heartbeat. · You have symptoms of a stroke. These may include:  ¨ Sudden numbness, tingling, weakness, or loss of movement in your face, arm, or leg, especially on only one side of your body. ¨ Sudden vision changes. ¨ Sudden trouble speaking. ¨ Sudden confusion or trouble understanding simple statements. ¨ Sudden problems with walking or balance.   ¨ A sudden, severe headache that is different from past headaches. · You have severe back or belly pain. Do not wait until your blood pressure comes down on its own. Get help right away. Call your doctor now or seek immediate care if:  · Your blood pressure is much higher than normal (such as 180/110 or higher), but you don't have symptoms. · You think high blood pressure is causing symptoms, such as:  ¨ Severe headache. ¨ Blurry vision. Watch closely for changes in your health, and be sure to contact your doctor if:  · Your blood pressure measures 140/90 or higher at least 2 times. That means the top number is 140 or higher or the bottom number is 90 or higher, or both. · You think you may be having side effects from your blood pressure medicine. · Your blood pressure is usually normal, but it goes above normal at least 2 times. Where can you learn more? Go to https://SpinVoxpeSportsHedge.SignNow. org and sign in to your [employer] account. Enter V196 in the Airbrite box to learn more about \"High Blood Pressure: Care Instructions. \"     If you do not have an account, please click on the \"Sign Up Now\" link. Current as of: August 8, 2016  Content Version: 11.3  © 2512-1527 [employer]. Care instructions adapted under license by [facility] ([facility]). If you have questions about a medical condition or this instruction, always ask your healthcare professional. [address]  any warranty or liability for your use of this information.

## 2017-10-25 ENCOUNTER — CARE COORDINATION (OUTPATIENT)
Dept: CARE COORDINATION | Age: 77
End: 2017-10-25

## 2017-11-09 ENCOUNTER — HOSPITAL ENCOUNTER (OUTPATIENT)
Age: 77
Discharge: HOME | End: 2017-11-09
Payer: MEDICARE

## 2017-11-09 LAB — INR BLD: 2.03 (ref 0.85–1.13)

## 2017-11-09 PROCEDURE — 36415 COLL VENOUS BLD VENIPUNCTURE: CPT

## 2017-11-09 PROCEDURE — 85610 PROTHROMBIN TIME: CPT

## 2017-11-14 ENCOUNTER — HOSPITAL ENCOUNTER (OUTPATIENT)
Age: 77
Setting detail: SPECIMEN
Discharge: HOME | End: 2017-11-14
Payer: MEDICARE

## 2017-11-14 ENCOUNTER — PROCEDURE VISIT (OUTPATIENT)
Dept: FAMILY MEDICINE CLINIC | Age: 77
End: 2017-11-14
Payer: MEDICARE

## 2017-11-14 VITALS
WEIGHT: 219.4 LBS | HEART RATE: 76 BPM | HEIGHT: 65 IN | DIASTOLIC BLOOD PRESSURE: 68 MMHG | SYSTOLIC BLOOD PRESSURE: 158 MMHG | BODY MASS INDEX: 36.55 KG/M2 | RESPIRATION RATE: 20 BRPM

## 2017-11-14 DIAGNOSIS — D49.2 SKIN NEOPLASM: Primary | ICD-10-CM

## 2017-11-14 PROCEDURE — G8417 CALC BMI ABV UP PARAM F/U: HCPCS | Performed by: FAMILY MEDICINE

## 2017-11-14 PROCEDURE — 4040F PNEUMOC VAC/ADMIN/RCVD: CPT | Performed by: FAMILY MEDICINE

## 2017-11-14 PROCEDURE — 88305 TISSUE EXAM BY PATHOLOGIST: CPT

## 2017-11-14 PROCEDURE — 99213 OFFICE O/P EST LOW 20 MIN: CPT | Performed by: FAMILY MEDICINE

## 2017-11-14 PROCEDURE — G8400 PT W/DXA NO RESULTS DOC: HCPCS | Performed by: FAMILY MEDICINE

## 2017-11-14 PROCEDURE — G8484 FLU IMMUNIZE NO ADMIN: HCPCS | Performed by: FAMILY MEDICINE

## 2017-11-14 PROCEDURE — 3017F COLORECTAL CA SCREEN DOC REV: CPT | Performed by: FAMILY MEDICINE

## 2017-11-14 PROCEDURE — 1036F TOBACCO NON-USER: CPT | Performed by: FAMILY MEDICINE

## 2017-11-14 PROCEDURE — 1123F ACP DISCUSS/DSCN MKR DOCD: CPT | Performed by: FAMILY MEDICINE

## 2017-11-14 PROCEDURE — 1090F PRES/ABSN URINE INCON ASSESS: CPT | Performed by: FAMILY MEDICINE

## 2017-11-14 PROCEDURE — G8427 DOCREV CUR MEDS BY ELIG CLIN: HCPCS | Performed by: FAMILY MEDICINE

## 2017-11-14 PROCEDURE — 11301 SHAVE SKIN LESION 0.6-1.0 CM: CPT | Performed by: FAMILY MEDICINE

## 2017-11-14 ASSESSMENT — PATIENT HEALTH QUESTIONNAIRE - PHQ9
1. LITTLE INTEREST OR PLEASURE IN DOING THINGS: 0
SUM OF ALL RESPONSES TO PHQ9 QUESTIONS 1 & 2: 1
SUM OF ALL RESPONSES TO PHQ QUESTIONS 1-9: 1
2. FEELING DOWN, DEPRESSED OR HOPELESS: 1

## 2017-11-14 NOTE — PROGRESS NOTES
S: Patient is here for shave biopsy of a lesion on her left forearm, extensor surface that has been present for the last 4 months. It is scabbed and she is here for further evaluation. She has a history of basal cell carcinoma removed from her face. O: Patient has a 5 x 6 mm raised irregular bordered lesion of the extensor surface of left forearm. This was removed by shave biopsy and submitted to pathology. A:  1. Skin neoplasm  52102 - [place] SHAV SKIN LES 6-10MM TRUNK,ARM,LEG    Surgical Pathology             Plan:  Orders Placed This Encounter   Procedures    Surgical Pathology     Standing Status:   Future     Standing Expiration Date:   11/14/2018     Order Specific Question:   PREVIOUS BIOPSY     Answer:   No     Order Specific Question:   PREOP DIAGNOSIS     Answer:   probable  BCC     Order Specific Question:   FROZEN SECTION - NO OR YES/SPECIMEN     Answer:   No    [address] - [place] SHAV SKIN LES 6-10MM TRUNK,ARM,LEG     The skin was prepped and anesthetized with 1% Lidocaine and Epinephrine. The 1 lesion was removed by shave biopsy technique without difficulty and submitted to pathology. Monsel's solution was used as a stypic agent and wound dressing applied. Wound instructions were given to the patient. Keep ointment and band-aid on arm until healed. FINAL DIAGNOSIS:  Skin lesion, left arm, shave biopsy:   Basal cell carcinoma.   11/16/17:

## 2017-11-14 NOTE — PROGRESS NOTES
Visit Information    Have you changed or started any medications since your last visit including any over-the-counter medicines, vitamins, or herbal medicines? no   Are you having any side effects from any of your medications? -  no  Have you stopped taking any of your medications? Is so, why? -  no    Have you seen any other physician or provider since your last visit? No  Have you had any other diagnostic tests since your last visit? No  Have you been seen in the emergency room and/or had an admission to a hospital since we last saw you? No  Have you had your routine dental cleaning in the past 6 months? no    Have you activated your [employer] account? If not, what are your barriers?  No: pt declined     Patient Care Team:  Uriah Matthew MD as PCP - General (Family Medicine)  Uriah Matthew MD as PCP - [facility] Attributed Provider  Nidia Wheeler RN as Care Coordinator    Medical History Review  Past Medical, Family, and Social History reviewed and does not contribute to the patient presenting condition    Health Maintenance   Topic Date Due    DTaP/Tdap/Td vaccine (1 - Tdap) 09[birth year]07/1961    Zostavax vaccine  09/07/2002    Diabetic retinal exam  03/18/2015    Diabetic foot exam  11/08/2017    Lipid screen  05/22/2018    Pneumococcal low/med risk (2 of 2 - PPSV23) 07/18/2018    Diabetic hemoglobin A1C test  09/25/2018    Colon cancer screen colonoscopy  12/23/2024    DEXA (modify frequency per FRAX score)  Addressed    Flu vaccine  Completed

## 2017-11-21 ENCOUNTER — PROCEDURE VISIT (OUTPATIENT)
Dept: FAMILY MEDICINE CLINIC | Age: 77
End: 2017-11-21
Payer: MEDICARE

## 2017-11-21 VITALS
TEMPERATURE: 97.6 F | SYSTOLIC BLOOD PRESSURE: 146 MMHG | HEIGHT: 64 IN | HEART RATE: 74 BPM | WEIGHT: 219.36 LBS | RESPIRATION RATE: 16 BRPM | DIASTOLIC BLOOD PRESSURE: 68 MMHG | BODY MASS INDEX: 37.45 KG/M2

## 2017-11-21 DIAGNOSIS — C44.619 BCC (BASAL CELL CARCINOMA), ARM, LEFT: ICD-10-CM

## 2017-11-21 PROCEDURE — 1036F TOBACCO NON-USER: CPT | Performed by: FAMILY MEDICINE

## 2017-11-21 PROCEDURE — G8417 CALC BMI ABV UP PARAM F/U: HCPCS | Performed by: FAMILY MEDICINE

## 2017-11-21 PROCEDURE — 99213 OFFICE O/P EST LOW 20 MIN: CPT | Performed by: FAMILY MEDICINE

## 2017-11-21 PROCEDURE — G8484 FLU IMMUNIZE NO ADMIN: HCPCS | Performed by: FAMILY MEDICINE

## 2017-11-21 PROCEDURE — 1090F PRES/ABSN URINE INCON ASSESS: CPT | Performed by: FAMILY MEDICINE

## 2017-11-21 PROCEDURE — 3017F COLORECTAL CA SCREEN DOC REV: CPT | Performed by: FAMILY MEDICINE

## 2017-11-21 PROCEDURE — 4040F PNEUMOC VAC/ADMIN/RCVD: CPT | Performed by: FAMILY MEDICINE

## 2017-11-21 PROCEDURE — 1123F ACP DISCUSS/DSCN MKR DOCD: CPT | Performed by: FAMILY MEDICINE

## 2017-11-21 PROCEDURE — G8400 PT W/DXA NO RESULTS DOC: HCPCS | Performed by: FAMILY MEDICINE

## 2017-11-21 PROCEDURE — G8427 DOCREV CUR MEDS BY ELIG CLIN: HCPCS | Performed by: FAMILY MEDICINE

## 2017-11-30 ENCOUNTER — CARE COORDINATION (OUTPATIENT)
Dept: CARE COORDINATION | Age: 77
End: 2017-11-30

## 2017-12-13 ENCOUNTER — TELEPHONE (OUTPATIENT)
Dept: FAMILY MEDICINE CLINIC | Age: 77
End: 2017-12-13

## 2017-12-13 ENCOUNTER — ANTI-COAG VISIT (OUTPATIENT)
Dept: FAMILY MEDICINE CLINIC | Age: 77
End: 2017-12-13

## 2017-12-13 ENCOUNTER — HOSPITAL ENCOUNTER (OUTPATIENT)
Age: 77
Discharge: HOME | End: 2017-12-13
Payer: MEDICARE

## 2017-12-13 LAB
ANISOCYTOSIS: ABNORMAL
BASOPHILS # BLD: 1.7 %
BASOPHILS ABSOLUTE: 0.2 THOU/MM3 (ref 0–0.1)
EOSINOPHIL # BLD: 3.2 %
EOSINOPHILS ABSOLUTE: 0.4 THOU/MM3 (ref 0–0.4)
HCT VFR BLD CALC: 36.9 % (ref 37–47)
HEMOGLOBIN: 11.7 GM/DL (ref 12–16)
HYPOCHROMIA: ABNORMAL
INR BLD: 1.98 (ref 0.85–1.13)
LYMPHOCYTES # BLD: 22.8 %
LYMPHOCYTES ABSOLUTE: 2.9 THOU/MM3 (ref 1–4.8)
MCH RBC QN AUTO: 24.9 PG (ref 27–31)
MCHC RBC AUTO-ENTMCNC: 31.6 GM/DL (ref 33–37)
MCV RBC AUTO: 78.6 FL (ref 81–99)
MICROCYTES: ABNORMAL
MONOCYTES # BLD: 7.7 %
MONOCYTES ABSOLUTE: 1 THOU/MM3 (ref 0.4–1.3)
NUCLEATED RED BLOOD CELLS: 0 /100 WBC
PDW BLD-RTO: 16.5 % (ref 11.5–14.5)
PLATELET # BLD: 418 THOU/MM3 (ref 130–400)
PMV BLD AUTO: 8.8 MCM (ref 7.4–10.4)
RBC # BLD: 4.69 MILL/MM3 (ref 4.2–5.4)
SEG NEUTROPHILS: 64.6 %
SEGMENTED NEUTROPHILS ABSOLUTE COUNT: 8.3 THOU/MM3 (ref 1.8–7.7)
WBC # BLD: 12.9 THOU/MM3 (ref 4.8–10.8)

## 2017-12-13 PROCEDURE — 36415 COLL VENOUS BLD VENIPUNCTURE: CPT

## 2017-12-13 PROCEDURE — 85025 COMPLETE CBC W/AUTO DIFF WBC: CPT

## 2017-12-13 PROCEDURE — 85610 PROTHROMBIN TIME: CPT

## 2017-12-13 NOTE — TELEPHONE ENCOUNTER
Patient saw Dr. Christine Arzola yesterday for [address] lesion procedure scheduled for 2/2/18, asking if patient can DC Coumadin and ASA 5 days prior. Please advise.

## 2017-12-19 ENCOUNTER — CARE COORDINATION (OUTPATIENT)
Dept: CARE COORDINATION | Age: 77
End: 2017-12-19

## 2017-12-19 NOTE — CARE COORDINATION
Spoke with [employer] today. He reports that Allegra Nix has been having elevated AM BS's. Reports that BS's are good t/o rest of day. States that AM BS's are sometimes in the 200's. He was only giving 28 units of lantus instead of 30 units that was increased in Sept. so he is going to increase her dose to the 30 units  and see if that makes a difference. Pt is also eating cookies and milk every night before bed so I have educated him again on trying to limit the sweets and trying healthier snacks. I advised him to call office if BS's do not improve. Pt also no longer taking the ferrous sulfate. He states that she was continuing to have difficulty having a BM even with giving her a stool softener and he felt that was causing a bigger issue. He reports since taking her off of it she has been having regular bm's without difficulty. He states that she has been off of ferrous sulfate for appx 4 wks.

## 2017-12-19 NOTE — CARE COORDINATION
hemiplegia from CVA, aphasia d/t CVA  Plan for overcoming my barriers:  to support and coordinate appts  Confidence: 6/10  Anticipated Goal Completion Date: 1/2/18              Prior to Admission medications    Medication Sig Start Date End Date Taking? Authorizing Provider   vitamin E 400 UNIT capsule Take 400 Units by mouth daily   Yes Historical Provider, MD   simvastatin (ZOCOR) 20 MG tablet TAKE ONE TABLET BY MOUTH NIGHTLY 10/24/17  Yes Logan Chris MD   metFORMIN (GLUCOPHAGE) 500 MG tablet Take 1 tablet by mouth daily (with breakfast) 10/9/17  Yes Logan Chris MD   citalopram (CELEXA) 10 MG tablet TAKE ONE TABLET BY MOUTH ONCE DAILY 8/14/17  Yes Logan Chris MD   JANUVIA 100 MG tablet TAKE ONE TABLET BY MOUTH ONCE DAILY 8/7/17  Yes Logan Chris MD   amLODIPine-benazepril (LOTREL) 10-20 MG per capsule TAKE ONE CAPSULE BY MOUTH ONCE DAILY FOR HIGH BLOOD PRESSURE 8/7/17  Yes Logan Chris MD   pantoprazole (PROTONIX) 40 MG tablet TAKE ONE TABLET BY MOUTH ONCE DAILY 8/7/17  Yes Logan Chris MD   oxybutynin (DITROPAN-XL) 10 MG extended release tablet TAKE ONE TABLET BY MOUTH ONCE DAILY 8/7/17  Yes Logan Chris MD   lisinopril (PRINIVIL;ZESTRIL) 40 MG tablet TAKE ONE TABLET BY MOUTH ONCE DAILY 8/7/17  Yes Logan Chris MD   glimepiride (AMARYL) 4 MG tablet TAKE TWO TABLETS BY MOUTH ONCE DAILY IN THE MORNING BEFORE BREAKFAST 7/24/17  Yes Logan Chris MD   insulin glargine (LANTUS SOLOSTAR) 100 UNIT/ML injection Inject 25 Units into the skin nightly    Yes Historical Provider, MD   warfarin (COUMADIN) 4 MG tablet Take as directed per pro time INR results. 9/21/16  Yes Logan Chris MD   calcium carbonate (OSCAL) 500 MG TABS tablet Take 500 mg by mouth daily   Yes Historical Provider, MD   aspirin EC 81 MG EC tablet Take 81 mg by mouth daily.  Indications: Cardiovascular Risk Reduction   Yes Historical Provider, MD   warfarin (COUMADIN) 5 MG tablet TAKE ONE TABLET BY MOUTH ONCE DAILY 10/23/17   Arianna Ontiveros Gallo Salvador MD   Insulin Pen Needle 31G X 8 MM MISC Inject 1 each into the skin daily   RELION brand requested. 8/4/16   Jodi Arciniega MD   ferrous sulfate 325 (65 FE) MG tablet Take 325 mg by mouth daily (with breakfast) Indications: gives 2-3 x per week     Historical Provider, MD   oxybutynin (DITROPAN XL) 10 MG CR tablet Take 1 tablet by mouth daily. 1/10/13 1/23/14  Jodi Arciniega MD   Glucagon HCl, rDNA, (GLUCAGEN HYPOKIT IJ) Inject 1 mg as directed as needed. Indications: Extremely Low Blood Sugar    Historical Provider, MD   glucose blood VI test strips (ONE TOUCH TEST STRIPS) strip Test blood sugar with One Touch Ultra 2 QID and as needed. 11/2/11   Jodi Arciniega MD   Insulin Pen Needle (PEN NEEDLES) 29G X 12MM MISC by Does not apply route daily.     Historical Provider, MD       Future Appointments  Date Time Provider Rema Franklini   1/25/2018 1:45 PM Jodi Arciniega MD [address]

## 2018-01-10 ENCOUNTER — HOSPITAL ENCOUNTER (OUTPATIENT)
Age: 78
Discharge: HOME | End: 2018-01-10
Payer: MEDICARE

## 2018-01-10 ENCOUNTER — TELEPHONE (OUTPATIENT)
Dept: FAMILY MEDICINE CLINIC | Age: 78
End: 2018-01-10

## 2018-01-10 DIAGNOSIS — Z86.73 S/P STROKE DUE TO CEREBROVASCULAR DISEASE: Primary | ICD-10-CM

## 2018-01-10 DIAGNOSIS — D15.1 MYXOMA OF HEART: ICD-10-CM

## 2018-01-10 LAB — INR BLD: 2.51 (ref 0.85–1.13)

## 2018-01-10 PROCEDURE — 85610 PROTHROMBIN TIME: CPT

## 2018-01-10 PROCEDURE — 36415 COLL VENOUS BLD VENIPUNCTURE: CPT

## 2018-01-11 ENCOUNTER — ANTI-COAG VISIT (OUTPATIENT)
Dept: FAMILY MEDICINE CLINIC | Age: 78
End: 2018-01-11

## 2018-01-11 ENCOUNTER — TELEPHONE (OUTPATIENT)
Dept: FAMILY MEDICINE CLINIC | Age: 78
End: 2018-01-11

## 2018-01-11 NOTE — TELEPHONE ENCOUNTER
INR 2.51  spoke with Micki Watts  Keep Coumadin at 5 mg 5 x week and 2.5 mg 2 x week  repeat lab in 1 mo

## 2018-01-22 ENCOUNTER — CARE COORDINATION (OUTPATIENT)
Dept: CARE COORDINATION | Age: 78
End: 2018-01-22

## 2018-01-22 NOTE — CARE COORDINATION
Interventions    Program Enrollment:  Rising Risk  Referral from Primary Care Provider:  Yes  Suggested Interventions and Community Resources  Home Health Services:  Completed (Comment: receives [facility] aide 2 hrs 3x per week. )  Meals on Wheels:  Completed (Comment: receives [employer])  Medi Set or Pill Pack:  (Comment: , Kely Bajwa manages meds)  Zone Management Tools:  Completed (Comment: diabetes)         Goals Addressed             Most Recent     care coordination-self monitoring   On track (1/22/2018)             Care Coordination Goal:  Patient Self-Monitoring  Choose a Goal:  BG monitoring: Yes     blood sugars ac  I will notify my provider of any trends of increasing or decreasing blood sugars over 1 month period of time. LINK TO BG TRACKER HERE. Barriers to success: impairment:  D/t CVA. Relies on  to provide meals  Plan for overcoming my barriers: provide education and support to     Confidence: 6/10                          Conditions and Symptoms   On track (1/22/2018)             I will schedule office visits, as directed by my provider. I will keep my appointment or reschedule if I have to cancel. I will notify my provider of any barriers to my plan of care. I will follow my Zone Management tool to seek urgent or emergent care. I will notify my provider of any symptoms that indicate a worsening of my condition. Barriers: impairment:  physical: hemiplegia from CVA, aphasia d/t CVA  Plan for overcoming my barriers:  to support and coordinate appts  Confidence: 6/10  Anticipated Goal Completion Date: ongoing              Prior to Admission medications    Medication Sig Start Date End Date Taking?  Authorizing Provider   vitamin E 400 UNIT capsule Take 400 Units by mouth daily    Historical Provider, MD   simvastatin (ZOCOR) 20 MG tablet TAKE ONE TABLET BY MOUTH NIGHTLY 10/24/17   Ketty Posada MD   warfarin (COUMADIN) 5 MG tablet TAKE ONE TABLET BY MOUTH ONCE DAILY Take 81 mg by mouth daily. Indications: Cardiovascular Risk Reduction    Historical Provider, MD   Insulin Pen Needle (PEN NEEDLES) 29G X 12MM MISC by Does not apply route daily.     Historical Provider, MD       Future Appointments  Date Time Provider Rema Gerda   1/25/2018 1:45 PM Nilda Robins MD [id number] [facility]

## 2018-01-25 ENCOUNTER — OFFICE VISIT (OUTPATIENT)
Dept: FAMILY MEDICINE CLINIC | Age: 78
End: 2018-01-25
Payer: MEDICARE

## 2018-01-25 VITALS
OXYGEN SATURATION: 96 % | HEIGHT: 64 IN | RESPIRATION RATE: 20 BRPM | SYSTOLIC BLOOD PRESSURE: 122 MMHG | DIASTOLIC BLOOD PRESSURE: 76 MMHG | WEIGHT: 218.4 LBS | HEART RATE: 86 BPM | BODY MASS INDEX: 37.28 KG/M2

## 2018-01-25 DIAGNOSIS — I10 ESSENTIAL HYPERTENSION: ICD-10-CM

## 2018-01-25 DIAGNOSIS — G81.11 SPASTIC HEMIPLEGIA OF RIGHT DOMINANT SIDE DUE TO NONTRAUMATIC INTRAPARENCHYMAL HEMORRHAGE OF BRAIN (HCC): ICD-10-CM

## 2018-01-25 DIAGNOSIS — E11.21 DM KIDNEY DISEASE (HCC): ICD-10-CM

## 2018-01-25 DIAGNOSIS — F32.5 MAJOR DEPRESSION, SINGLE EPISODE, IN COMPLETE REMISSION (HCC): ICD-10-CM

## 2018-01-25 DIAGNOSIS — E11.69 DIABETES MELLITUS TYPE 2 IN OBESE (HCC): Primary | ICD-10-CM

## 2018-01-25 DIAGNOSIS — E78.2 MIXED HYPERLIPIDEMIA: ICD-10-CM

## 2018-01-25 DIAGNOSIS — I69.351 HEMIPLEGIA AND HEMIPARESIS FOLLOWING CEREBRAL INFARCTION AFFECTING RIGHT DOMINANT SIDE (HCC): ICD-10-CM

## 2018-01-25 DIAGNOSIS — I69.320 APHASIA AS LATE EFFECT OF CEREBROVASCULAR ACCIDENT: ICD-10-CM

## 2018-01-25 DIAGNOSIS — F33.41 RECURRENT MAJOR DEPRESSIVE DISORDER, IN PARTIAL REMISSION (HCC): ICD-10-CM

## 2018-01-25 DIAGNOSIS — Z51.81 MEDICATION MONITORING ENCOUNTER: ICD-10-CM

## 2018-01-25 DIAGNOSIS — K21.9 GASTROESOPHAGEAL REFLUX DISEASE WITHOUT ESOPHAGITIS: ICD-10-CM

## 2018-01-25 DIAGNOSIS — E66.9 DIABETES MELLITUS TYPE 2 IN OBESE (HCC): Primary | ICD-10-CM

## 2018-01-25 DIAGNOSIS — I61.9 SPASTIC HEMIPLEGIA OF RIGHT DOMINANT SIDE DUE TO NONTRAUMATIC INTRAPARENCHYMAL HEMORRHAGE OF BRAIN (HCC): ICD-10-CM

## 2018-01-25 LAB — HBA1C MFR BLD: 8.6 %

## 2018-01-25 PROCEDURE — 4040F PNEUMOC VAC/ADMIN/RCVD: CPT | Performed by: FAMILY MEDICINE

## 2018-01-25 PROCEDURE — 1036F TOBACCO NON-USER: CPT | Performed by: FAMILY MEDICINE

## 2018-01-25 PROCEDURE — 3017F COLORECTAL CA SCREEN DOC REV: CPT | Performed by: FAMILY MEDICINE

## 2018-01-25 PROCEDURE — G8400 PT W/DXA NO RESULTS DOC: HCPCS | Performed by: FAMILY MEDICINE

## 2018-01-25 PROCEDURE — 1090F PRES/ABSN URINE INCON ASSESS: CPT | Performed by: FAMILY MEDICINE

## 2018-01-25 PROCEDURE — 3045F PR MOST RECENT HEMOGLOBIN A1C LEVEL 7.0-9.0%: CPT | Performed by: FAMILY MEDICINE

## 2018-01-25 PROCEDURE — G8484 FLU IMMUNIZE NO ADMIN: HCPCS | Performed by: FAMILY MEDICINE

## 2018-01-25 PROCEDURE — 99214 OFFICE O/P EST MOD 30 MIN: CPT | Performed by: FAMILY MEDICINE

## 2018-01-25 PROCEDURE — G8427 DOCREV CUR MEDS BY ELIG CLIN: HCPCS | Performed by: FAMILY MEDICINE

## 2018-01-25 PROCEDURE — G8417 CALC BMI ABV UP PARAM F/U: HCPCS | Performed by: FAMILY MEDICINE

## 2018-01-25 PROCEDURE — 1123F ACP DISCUSS/DSCN MKR DOCD: CPT | Performed by: FAMILY MEDICINE

## 2018-01-25 PROCEDURE — 83036 HEMOGLOBIN GLYCOSYLATED A1C: CPT | Performed by: FAMILY MEDICINE

## 2018-01-25 RX ORDER — GLIMEPIRIDE 4 MG/1
8 TABLET ORAL DAILY
Qty: 180 TABLET | Refills: 2 | Status: SHIPPED | OUTPATIENT
Start: 2018-01-25 | End: 2018-07-03 | Stop reason: SDUPTHER

## 2018-01-25 ASSESSMENT — ENCOUNTER SYMPTOMS
SHORTNESS OF BREATH: 0
COUGH: 0
GASTROINTESTINAL NEGATIVE: 1
RESPIRATORY NEGATIVE: 1

## 2018-01-25 NOTE — PROGRESS NOTES
Subjective:      Patient ID: Lanie Cortez is a 78 y.o. female. HPI  Follow up of chronic conditions. Encounter Diagnoses   Name Primary?  Diabetes mellitus type 2 in obese ([address] Yes    Essential hypertension     Mixed hyperlipidemia     Gastroesophageal reflux disease without esophagitis     Spastic hemiplegia of right dominant side due to nontraumatic intraparenchymal hemorrhage of brain (HCC)     Aphasia as late effect of cerebrovascular accident     Recurrent major depressive disorder, in partial remission ([address]     Medication monitoring encounter      DM2 is stable without any symptoms of hypoglycemia. Home BS readings reviewed. A1c today is 8.6%. Lab Results   Component Value Date    LABA1C 8.6 01/25/2018    LABA1C 8.0 (H) 09/25/2017    LABA1C 7.6 (H) 02/08/2017     Lab Results   Component Value Date    LABMICR 1.58 07/25/2017    LDLCALC 44 05/22/2017    CREATININE 1.0 09/25/2017     Lipid values will be reviewed and retested later this year. The need taken continue controlling her carbohydrates was discussed with her . This will have an effect on lowering her triglyceride levels. Cholesterol, Total (mg/dl)   Date Value   05/22/2017 135     HDL (mg/dl)   Date Value   05/22/2017 31     Triglycerides (mg/dl)   Date Value   05/22/2017 298 (H)     Lab Results   Component Value Date    LDLCALC 44 05/22/2017     HTN is stable with current medications. Pt has no medication side effects nor orthostatic symptoms. BP Readings from Last 3 Encounters:   01/25/18 122/76   11/21/17 (!) 146/68   11/14/17 (!) 158/68     Her aphasia is unchanged and as a consequence of her old CVA. Her response to questions continues to be \"just a little bit\". Her right-sided hemiplegia continues to be unchanged and his been no change in her right hand paralysis. He continues to be nonfunctional except for very slow light attempt at gripping noticed on testing.     Her depression symptoms are essentially in remission other than when she has \"up-and-down days\". This is to be expected since she had her stroke and disability. Her  continues to be her primary and main caregiver. The rest of this patient's conditions are stable. Past medical and surgical hx reviewed. Past Medical History:   Diagnosis Date    CAD (coronary artery disease)     Cerebrovascular disease     Depression     Hyperlipidemia     Hypertension     Self-care deficit for dressing and grooming     Self-care deficit for medication administration 2010    Type II or unspecified type diabetes mellitus without mention of complication, not stated as uncontrolled 2010    Unspecified cerebral artery occlusion with cerebral infarction      Past Surgical History:   Procedure Laterality Date    CARDIAC CATHETERIZATION  2004    CHOLECYSTECTOMY  1965    COLONOSCOPY      HEMORRHOID SURGERY  2001    JOINT REPLACEMENT  2008    Lt Total Knee    JOINT REPLACEMENT  2009    Rt Total Knee     Portions of this note were completed with a voice recording program.  Efforts were made to edit the dictations but occasionally words are mis-transcribed. Review of Systems   HENT: Negative. Negative for congestion. Respiratory: Negative. Negative for cough and shortness of breath. Cardiovascular: Negative. Negative for chest pain, palpitations and leg swelling. Gastrointestinal: Negative. Genitourinary:        There is no change in her urinary incontinence. She continues to wear pull-ups routinely. Musculoskeletal: Positive for arthralgias. She points to her shoulders as areas of discomfort but exam is unremarkable. Skin: Positive for rash. Neurological: Positive for weakness. See HPI. Hematological: Negative. Psychiatric/Behavioral: Negative for dysphoric mood and sleep disturbance. The patient is not nervous/anxious. All other systems reviewed and are negative.       Objective:   Physical Exam   Constitutional: She is oriented to person, place, and time. She appears well-developed and well-nourished. HENT:   Right Ear: External ear normal.   Left Ear: External ear normal.   Mouth/Throat: Oropharynx is clear and moist.   Eyes: Conjunctivae are normal.   Neck: No thyromegaly present. Cardiovascular: Normal rate, regular rhythm, S1 normal and S2 normal.   No extrasystoles are present. Exam reveals no gallop. Murmur heard. Crescendo systolic murmur is present with a grade of 2/6   Pulmonary/Chest: Effort normal and breath sounds normal. She has no decreased breath sounds. She has no wheezes. She has no rhonchi. She has no rales. Abdominal: Soft. Bowel sounds are normal.   Musculoskeletal: She exhibits no edema. Lymphadenopathy:     She has no cervical adenopathy. Neurological: She is alert and oriented to person, place, and time. No sensory deficit. They have any paralysis of her right upper extremity is unchanged. There is very slight initiating  movement but there is no strength there. Skin: Skin is warm and dry. Visual inspection:  Deformity/amputation: absent  Skin lesions/pre-ulcerative calluses: absent  Edema: right- negative, left- negative    Sensory exam:  Monofilament sensation: normal  (minimum of 5 random plantar locations tested, avoiding callused areas - > 1 area with absence of sensation is + for neuropathy)    Plus at least one of the following:  Pulses: normal,   Pinprick: Intact  Proprioception: Intact  Vibration (128 Hz): N/A     Nursing note and vitals reviewed. Assessment:      1. Diabetes mellitus type 2 in obese (HCC)  POCT glycosylated hemoglobin (Hb A1C)    glimepiride (AMARYL) 4 MG tablet     DIABETES FOOT EXAM   2. Essential hypertension     3. Mixed hyperlipidemia     4. Gastroesophageal reflux disease without esophagitis     5.  Spastic hemiplegia of right dominant side due to nontraumatic intraparenchymal hemorrhage of brain ([facility] [address]     6. Aphasia as late effect 12/22/2014   PHQ2 Score 1 0 1 0   PHQ9 Score 1 0 1 0     Interpretation of Total Score Depression Severity: 1-4 = Minimal depression, 5-9 = Mild depression, 10-14 = Moderate depression, 15-19 = Moderately severe depression, 20-27 = Severe depression

## 2018-01-25 NOTE — PATIENT INSTRUCTIONS
You may receive a survey about your visit with us today. The feedback from our patients helps us identify what is working well and where the service to all patients can be enhanced. Thank you! Continue present medications.

## 2018-02-06 DIAGNOSIS — E11.49 TYPE 2 DIABETES MELLITUS WITH OTHER NEUROLOGIC COMPLICATION, WITHOUT LONG-TERM CURRENT USE OF INSULIN (HCC): ICD-10-CM

## 2018-02-06 DIAGNOSIS — Z79.4 TYPE 2 DIABETES MELLITUS WITH OTHER NEUROLOGIC COMPLICATION, WITH LONG-TERM CURRENT USE OF INSULIN (HCC): ICD-10-CM

## 2018-02-06 DIAGNOSIS — E11.49 TYPE 2 DIABETES MELLITUS WITH OTHER NEUROLOGIC COMPLICATION, WITH LONG-TERM CURRENT USE OF INSULIN (HCC): ICD-10-CM

## 2018-02-06 RX ORDER — INSULIN GLARGINE 100 [IU]/ML
INJECTION, SOLUTION SUBCUTANEOUS
Qty: 15 PEN | Refills: 5 | Status: ON HOLD | OUTPATIENT
Start: 2018-02-06 | End: 2018-09-06 | Stop reason: ALTCHOICE

## 2018-02-06 RX ORDER — PEN NEEDLE, DIABETIC 29 G X1/2"
NEEDLE, DISPOSABLE MISCELLANEOUS
Qty: 50 EACH | Refills: 5 | Status: SHIPPED | OUTPATIENT
Start: 2018-02-06 | End: 2019-09-16

## 2018-03-07 ENCOUNTER — CARE COORDINATION (OUTPATIENT)
Dept: CARE COORDINATION | Age: 78
End: 2018-03-07

## 2018-03-07 NOTE — CARE COORDINATION
Ambulatory Care Coordination Note  3/7/2018  CM Risk Score: 5  Darius Mortality Risk Score: 8.64    ACC: Yolis Contreras, RN    Summary Note: spoke with  Martin Garcia today. He states that Deven Mei is doing fine. Pt was seen 1/25. A1C is 8.6. Discussed and attempted to reinforce importance of diet and encouraged to make some small adjustments with her diet.  resistant to change. States that they have a routine and he is sticking to it. Pt continues to drink juice and milk every morning and milk with the remaining meals. Encouraged to incorporate water.  declines. States that she has never been a water drinker. Suggested he offer her water to see if she would drink it. Continues to eat cookies and drink glass of milk every night at bedtime. I have suggested in the past to limit cookies and if nothing else maybe have her cookies earlier in the evening.  has been resistant to this also. He has been applying cream to buttocks, but not consistently. States that area does not seem to bother her so he doesn't mess with it much. Encouraged him to make sure she is staying clean and dry. Denies having any new concerns at this time. Reinforced importance of early symptom recognition and reporting. Encouraged to call office with any new questions or concerns. Reviewed Diabetes zone mgmt. Diabetes Assessment    Medic Alert ID:  No  Meal Planning:  None   How often do you test your blood sugar?:  Meals   Do you have barriers with adherence to non-pharmacologic self-management interventions? (Nutrition/Exercise/Self-Monitoring):  Yes   Have you ever had to go to the ED for symptoms of low blood sugar?:  No       No patient-reported symptoms                Care Coordination Interventions    Program Enrollment:  Rising Risk  Referral from Primary Care Provider:  Yes  Suggested Interventions and [facility] Mayi Hedricky:  [facility] 3x per [facility]. )  Meals on Wheels:  Completed (Comment: receives [employer]als)  Medi Set or Pill Pack:  (Comment: , Viridiana Nava manages meds)  Zone Management Tools:  Completed (Comment: diabetes)         Goals Addressed             Most Recent     care coordination-self monitoring   On track (3/7/2018)             Care Coordination Goal:  Patient Self-Monitoring  Choose a Goal:  BG monitoring: Yes     blood sugars ac  I will notify my provider of any trends of increasing or decreasing blood sugars over 1 month period of time. LINK TO BG TRACKER HERE. Barriers to success: impairment:  D/t CVA. Relies on  to provide meals  Plan for overcoming my barriers: provide education and support to     Confidence: 6/10                          Conditions and Symptoms   On track (3/7/2018)             I will schedule office visits, as directed by my provider. I will keep my appointment or reschedule if I have to cancel. I will notify my provider of any barriers to my plan of care. I will follow my Zone Management tool to seek urgent or emergent care. I will notify my provider of any symptoms that indicate a worsening of my condition. Barriers: impairment:  physical: hemiplegia from CVA, aphasia d/t CVA  Plan for overcoming my barriers:  to support and coordinate appts  Confidence: 6/10  Anticipated Goal Completion Date: ongoing              Prior to Admission medications    Medication Sig Start Date End Date Taking?  Authorizing Provider   ZHEN SOLOSTAR 100 UNIT/ML injection pen INJECT 25 UNITS SUBCUTANEOUSLY NIGHTLY 2/6/18  Yes Carmine Irwin MD   Ascorbic Acid (VITAMIN C PO) Take by mouth   Yes Historical Provider, MD   glimepiride (AMARYL) 4 MG tablet Take 2 tablets by mouth daily 1/25/18  Yes Carmine Irwin MD   vitamin E 400 UNIT capsule Take 400 Units by mouth daily   Yes Historical Provider, MD   simvastatin (ZOCOR) 20 MG tablet TAKE ONE TABLET BY MOUTH NIGHTLY 10/24/17  Yes Carmine Irwin MD metFORMIN (GLUCOPHAGE) 500 MG tablet Take 1 tablet by mouth daily (with breakfast) 10/9/17  Yes Sonia Mcgill MD   citalopram (CELEXA) 10 MG tablet TAKE ONE TABLET BY MOUTH ONCE DAILY 8/14/17  Yes Sonia Mcgill MD   JANUVIA 100 MG tablet TAKE ONE TABLET BY MOUTH ONCE DAILY 8/7/17  Yes Sonia Mcgill MD   amLODIPine-benazepril (LOTREL) 10-20 MG per capsule TAKE ONE CAPSULE BY MOUTH ONCE DAILY FOR HIGH BLOOD PRESSURE 8/7/17  Yes Sonia Mcgill MD   pantoprazole (PROTONIX) 40 MG tablet TAKE ONE TABLET BY MOUTH ONCE DAILY 8/7/17  Yes Sonia Mcgill MD   oxybutynin (DITROPAN-XL) 10 MG extended release tablet TAKE ONE TABLET BY MOUTH ONCE DAILY 8/7/17  Yes Sonia Mcgill MD   lisinopril (PRINIVIL;ZESTRIL) 40 MG tablet TAKE ONE TABLET BY MOUTH ONCE DAILY 8/7/17  Yes Sonia Mcgill MD   insulin glargine (LANTUS SOLOSTAR) 100 UNIT/ML injection Inject 25 Units into the skin nightly    Yes Historical Provider, MD   warfarin (COUMADIN) 4 MG tablet Take as directed per pro time INR results. 9/21/16  Yes Sonia Mcgill MD   calcium carbonate (OSCAL) 500 MG TABS tablet Take 500 mg by mouth daily   Yes Historical Provider, MD   ferrous sulfate 325 (65 FE) MG tablet Take 325 mg by mouth daily (with breakfast) Indications: gives 2-3 x per week    Yes Historical Provider, MD   aspirin EC 81 MG EC tablet Take 81 mg by mouth daily. Indications: Cardiovascular Risk Reduction   Yes Historical Provider, MD WIENER SHORT PEN NEEDLES 31G X 8 MM MISC USE TO INJECT ONCE DAILY SUBCUTANEOUSLY 2/6/18   Sonia Mcgill MD   warfarin (COUMADIN) 5 MG tablet TAKE ONE TABLET BY MOUTH ONCE DAILY 10/23/17   Sonia Mcgill MD   oxybutynin (DITROPAN XL) 10 MG CR tablet Take 1 tablet by mouth daily. 1/10/13 1/23/14  Sonia Mcgill MD   Glucagon HCl, rDNA, (GLUCAGEN HYPOKIT IJ) Inject 1 mg as directed as needed.  Indications: Extremely Low Blood Sugar    Historical Provider, MD   glucose blood VI test strips (ONE TOUCH TEST STRIPS) strip Test blood sugar with One

## 2018-03-22 ENCOUNTER — HOSPITAL ENCOUNTER (OUTPATIENT)
Age: 78
Discharge: HOME | End: 2018-03-22
Payer: MEDICARE

## 2018-03-22 DIAGNOSIS — D15.1 MYXOMA OF HEART: ICD-10-CM

## 2018-03-22 DIAGNOSIS — Z86.73 S/P STROKE DUE TO CEREBROVASCULAR DISEASE: ICD-10-CM

## 2018-03-22 LAB — INR BLD: 2.24 (ref 0.85–1.13)

## 2018-03-22 PROCEDURE — 36415 COLL VENOUS BLD VENIPUNCTURE: CPT

## 2018-03-22 PROCEDURE — 85610 PROTHROMBIN TIME: CPT

## 2018-04-23 ENCOUNTER — TELEPHONE (OUTPATIENT)
Dept: FAMILY MEDICINE CLINIC | Age: 78
End: 2018-04-23

## 2018-04-23 ENCOUNTER — CARE COORDINATION (OUTPATIENT)
Dept: CARE COORDINATION | Age: 78
End: 2018-04-23

## 2018-04-24 ENCOUNTER — TELEPHONE (OUTPATIENT)
Dept: FAMILY MEDICINE CLINIC | Age: 78
End: 2018-04-24

## 2018-06-05 ENCOUNTER — CARE COORDINATION (OUTPATIENT)
Dept: CARE COORDINATION | Age: 78
End: 2018-06-05

## 2018-06-11 ENCOUNTER — CARE COORDINATION (OUTPATIENT)
Dept: CARE COORDINATION | Age: 78
End: 2018-06-11

## 2018-06-18 ENCOUNTER — TELEPHONE (OUTPATIENT)
Dept: FAMILY MEDICINE CLINIC | Age: 78
End: 2018-06-18

## 2018-06-25 ENCOUNTER — TELEPHONE (OUTPATIENT)
Dept: FAMILY MEDICINE CLINIC | Age: 78
End: 2018-06-25

## 2018-06-25 DIAGNOSIS — D15.1 MYXOMA OF HEART: ICD-10-CM

## 2018-06-25 DIAGNOSIS — I69.320 APHASIA AS LATE EFFECT OF CEREBROVASCULAR ACCIDENT: Primary | ICD-10-CM

## 2018-06-25 DIAGNOSIS — Z86.73 S/P STROKE DUE TO CEREBROVASCULAR DISEASE: ICD-10-CM

## 2018-06-27 ENCOUNTER — CARE COORDINATION (OUTPATIENT)
Dept: CARE COORDINATION | Age: 78
End: 2018-06-27

## 2018-07-03 ENCOUNTER — HOSPITAL ENCOUNTER (OUTPATIENT)
Dept: PHARMACY | Age: 78
Setting detail: THERAPIES SERIES
Discharge: HOME | End: 2018-07-03
Payer: MEDICARE

## 2018-07-03 ENCOUNTER — OFFICE VISIT (OUTPATIENT)
Dept: FAMILY MEDICINE CLINIC | Age: 78
End: 2018-07-03
Payer: MEDICARE

## 2018-07-03 VITALS
DIASTOLIC BLOOD PRESSURE: 80 MMHG | WEIGHT: 215 LBS | HEART RATE: 76 BPM | RESPIRATION RATE: 16 BRPM | HEIGHT: 64 IN | SYSTOLIC BLOOD PRESSURE: 136 MMHG | BODY MASS INDEX: 36.7 KG/M2

## 2018-07-03 DIAGNOSIS — N39.41 URGE INCONTINENCE OF URINE: ICD-10-CM

## 2018-07-03 DIAGNOSIS — M15.9 PRIMARY OSTEOARTHRITIS INVOLVING MULTIPLE JOINTS: ICD-10-CM

## 2018-07-03 DIAGNOSIS — D15.1 MYXOMA OF HEART: ICD-10-CM

## 2018-07-03 DIAGNOSIS — I10 ESSENTIAL HYPERTENSION: Primary | ICD-10-CM

## 2018-07-03 DIAGNOSIS — K21.9 GASTROESOPHAGEAL REFLUX DISEASE WITHOUT ESOPHAGITIS: ICD-10-CM

## 2018-07-03 DIAGNOSIS — I69.320 APHASIA AS LATE EFFECT OF CEREBROVASCULAR ACCIDENT: ICD-10-CM

## 2018-07-03 DIAGNOSIS — E66.9 DIABETES MELLITUS TYPE 2 IN OBESE (HCC): ICD-10-CM

## 2018-07-03 DIAGNOSIS — E78.2 MIXED HYPERLIPIDEMIA: ICD-10-CM

## 2018-07-03 DIAGNOSIS — I61.9 SPASTIC HEMIPLEGIA OF RIGHT DOMINANT SIDE DUE TO NONTRAUMATIC INTRAPARENCHYMAL HEMORRHAGE OF BRAIN (HCC): ICD-10-CM

## 2018-07-03 DIAGNOSIS — I69.320 CVA, OLD, APHASIA: ICD-10-CM

## 2018-07-03 DIAGNOSIS — G81.11 SPASTIC HEMIPLEGIA OF RIGHT DOMINANT SIDE DUE TO NONTRAUMATIC INTRAPARENCHYMAL HEMORRHAGE OF BRAIN (HCC): ICD-10-CM

## 2018-07-03 DIAGNOSIS — Z51.81 MEDICATION MONITORING ENCOUNTER: ICD-10-CM

## 2018-07-03 DIAGNOSIS — E11.69 DIABETES MELLITUS TYPE 2 IN OBESE (HCC): ICD-10-CM

## 2018-07-03 LAB
HBA1C MFR BLD: 8.5 %
POC INR: 2.3 (ref 0.8–1.2)

## 2018-07-03 PROCEDURE — 85610 PROTHROMBIN TIME: CPT

## 2018-07-03 PROCEDURE — 99211 OFF/OP EST MAY X REQ PHY/QHP: CPT

## 2018-07-03 PROCEDURE — 99495 TRANSJ CARE MGMT MOD F2F 14D: CPT | Performed by: FAMILY MEDICINE

## 2018-07-03 PROCEDURE — 36416 COLLJ CAPILLARY BLOOD SPEC: CPT

## 2018-07-03 PROCEDURE — 83036 HEMOGLOBIN GLYCOSYLATED A1C: CPT | Performed by: FAMILY MEDICINE

## 2018-07-03 PROCEDURE — 1111F DSCHRG MED/CURRENT MED MERGE: CPT | Performed by: FAMILY MEDICINE

## 2018-07-03 RX ORDER — GLIMEPIRIDE 4 MG/1
8 TABLET ORAL DAILY
Qty: 180 TABLET | Refills: 2 | Status: ON HOLD | OUTPATIENT
Start: 2018-07-03 | End: 2019-05-03 | Stop reason: HOSPADM

## 2018-07-03 NOTE — PROGRESS NOTES
Subjective:      Patient ID: Julieta Dougherty is a 78 y.o. female. HPI  Follow up of chronic conditions. Encounter Diagnoses   Name Primary?  Diabetes mellitus type 2 in obese ([facility] [address]     Medication monitoring encounter     Essential hypertension Yes    Mixed hyperlipidemia     Primary osteoarthritis involving multiple joints     Aphasia as late effect of cerebrovascular accident     Gastroesophageal reflux disease without esophagitis     Urge incontinence of urine     CVA, old, aphasia     Spastic hemiplegia of right dominant side due to nontraumatic intraparenchymal hemorrhage of brain (HCC)      DM2 is stable without any symptoms of hypoglycemia. Lab Results   Component Value Date    LABA1C 8.6 01/25/2018    LABA1C 8.0 (H) 09/25/2017    LABA1C 7.6 (H) 02/08/2017     Lab Results   Component Value Date    LABMICR 1.58 07/25/2017    LDLCALC 44 05/22/2017    CREATININE 1.0 09/25/2017       HTN is stable with current medications. Pt has no medication side effects nor orthostatic symptoms. BP Readings from Last 3 Encounters:   07/03/18 136/80   01/25/18 122/76   11/21/17 (!) 146/68     No change in CVA status. Right hemiplegia and aphasia are unchanged. Review of Systems    Objective:   Physical Exam    Assessment:        Diagnosis Orders   1. Essential hypertension     2. Diabetes mellitus type 2 in obese (HCC)  glimepiride (AMARYL) 4 MG tablet    POCT glycosylated hemoglobin (Hb A1C)   3. Medication monitoring encounter  glimepiride (AMARYL) 4 MG tablet   4. Mixed hyperlipidemia     5. Primary osteoarthritis involving multiple joints     6. Aphasia as late effect of cerebrovascular accident     7. Gastroesophageal reflux disease without esophagitis     8. Urge incontinence of urine     9. CVA, old, aphasia     8.  Spastic hemiplegia of right dominant side due to nontraumatic intraparenchymal hemorrhage of brain [facility])             Plan:      Orders Placed This Encounter   Procedures    POCT TABLET BY MOUTH ONCE DAILY             RELION SHORT PEN NEEDLES 31G X 8 MM MISC  USE TO INJECT ONCE DAILY SUBCUTANEOUSLY             simvastatin (ZOCOR) 20 MG tablet  TAKE ONE TABLET BY MOUTH NIGHTLY             vitamin E 400 UNIT capsule  Take 400 Units by mouth daily             warfarin (COUMADIN) 5 MG tablet  TAKE ONE TABLET BY MOUTH ONCE DAILY                   Medications marked \"taking\" at this time  Outpatient Prescriptions Marked as Taking for the 7/3/18 encounter (Office Visit) with Jose Marin MD   Medication Sig Dispense Refill    glimepiride (AMARYL) 4 MG tablet Take 2 tablets by mouth daily 180 tablet 2    RELION SHORT PEN NEEDLES 31G X 8 MM MISC USE TO INJECT ONCE DAILY SUBCUTANEOUSLY 50 each 5    LANTUS SOLOSTAR 100 UNIT/ML injection pen INJECT 25 UNITS SUBCUTANEOUSLY NIGHTLY 15 pen 5    Ascorbic Acid (VITAMIN C PO) Take by mouth      vitamin E 400 UNIT capsule Take 400 Units by mouth daily      simvastatin (ZOCOR) 20 MG tablet TAKE ONE TABLET BY MOUTH NIGHTLY 90 tablet 3    warfarin (COUMADIN) 5 MG tablet TAKE ONE TABLET BY MOUTH ONCE DAILY 90 tablet 3    metFORMIN (GLUCOPHAGE) 500 MG tablet Take 1 tablet by mouth daily (with breakfast) 60 tablet 5    citalopram (CELEXA) 10 MG tablet TAKE ONE TABLET BY MOUTH ONCE DAILY 90 tablet 3    JANUVIA 100 MG tablet TAKE ONE TABLET BY MOUTH ONCE DAILY 90 tablet 3    amLODIPine-benazepril (LOTREL) 10-20 MG per capsule TAKE ONE CAPSULE BY MOUTH ONCE DAILY FOR HIGH BLOOD PRESSURE 90 capsule 3    pantoprazole (PROTONIX) 40 MG tablet TAKE ONE TABLET BY MOUTH ONCE DAILY 90 tablet 3    oxybutynin (DITROPAN-XL) 10 MG extended release tablet TAKE ONE TABLET BY MOUTH ONCE DAILY 90 tablet 3    lisinopril (PRINIVIL;ZESTRIL) 40 MG tablet TAKE ONE TABLET BY MOUTH ONCE DAILY 90 tablet 3    [DISCONTINUED] insulin glargine (LANTUS SOLOSTAR) 100 UNIT/ML injection Inject 25 Units into the skin nightly       [DISCONTINUED] warfarin (COUMADIN) 4 MG tablet Take as directed per pro time INR results. 100 tablet 3    calcium carbonate (OSCAL) 500 MG TABS tablet Take 500 mg by mouth daily      [DISCONTINUED] ferrous sulfate 325 (65 FE) MG tablet Take 325 mg by mouth daily (with breakfast) Indications: gives 2-3 x per week       Glucagon HCl, rDNA, (GLUCAGEN HYPOKIT IJ) Inject 1 mg as directed as needed. Indications: Extremely Low Blood Sugar      glucose blood VI test strips (ONE TOUCH TEST STRIPS) strip Test blood sugar with One Touch Ultra 2 QID and as needed. 100 strip 5    aspirin EC 81 MG EC tablet Take 81 mg by mouth daily. Indications: Cardiovascular Risk Reduction      Insulin Pen Needle (PEN NEEDLES) 29G X 12MM MISC by Does not apply route daily. Medications patient taking as of now reconciled against medications ordered at time of hospital discharge: Yes    Vitals:    07/03/18 1207   BP: 136/80   Site: Left Arm   Position: Sitting   Cuff Size: Large Adult   Pulse: 76   Resp: 16   Weight: 215 lb (97.5 kg)   Height: 5' 4\" (1.626 m)     Body mass index is 36.9 kg/m². Wt Readings from Last 3 Encounters:   07/03/18 215 lb (97.5 kg)   01/25/18 218 lb 6.4 oz (99.1 kg)   11/21/17 219 lb 5.7 oz (99.5 kg)     BP Readings from Last 3 Encounters:   07/03/18 136/80   01/25/18 122/76   11/21/17 (!) 146/68        Inpatient course: Discharge summary reviewed- see chart. Chief Complaint   Patient presents with    3 Month Follow-Up     History of Present illness - Follow up of Hospital diagnosis(es):    Diagnosis Orders   1. Essential hypertension     2. Diabetes mellitus type 2 in obese (HCC)  glimepiride (AMARYL) 4 MG tablet    POCT glycosylated hemoglobin (Hb A1C)   3. Medication monitoring encounter  glimepiride (AMARYL) 4 MG tablet   4. Mixed hyperlipidemia     5. Primary osteoarthritis involving multiple joints     6. Aphasia as late effect of cerebrovascular accident     7. Gastroesophageal reflux disease without esophagitis     8.  Urge incontinence of urine hemorrhage of brain [facility])          Medical Decision Making: moderate complexity

## 2018-07-03 NOTE — PATIENT INSTRUCTIONS
You may receive a survey about your visit with us today. The feedback from our patients helps us identify what is working well and where the service to all patients can be enhanced. Thank you! Continue present medications. Continue present level of physical activity as tolerated.

## 2018-07-17 ENCOUNTER — HOSPITAL ENCOUNTER (OUTPATIENT)
Dept: PHARMACY | Age: 78
Setting detail: THERAPIES SERIES
Discharge: HOME | End: 2018-07-17
Payer: MEDICARE

## 2018-07-17 DIAGNOSIS — I69.320 CVA, OLD, APHASIA: ICD-10-CM

## 2018-07-17 DIAGNOSIS — D15.1 MYXOMA OF HEART: ICD-10-CM

## 2018-07-17 LAB — POC INR: 4.2 (ref 0.8–1.2)

## 2018-07-17 PROCEDURE — 99211 OFF/OP EST MAY X REQ PHY/QHP: CPT

## 2018-07-17 PROCEDURE — 85610 PROTHROMBIN TIME: CPT

## 2018-07-17 PROCEDURE — 36416 COLLJ CAPILLARY BLOOD SPEC: CPT

## 2018-07-17 NOTE — PROGRESS NOTES
Medication Management [facility]  Anticoagulation Clinic  [phone] (phone)  [phone] (fax)      Ms. Chalino Saxena is a 78 y.o.  female with history of CVA who presents today for anticoagulation monitoring and adjustment. Patient verifies current dosing regimen and tablet strength. No missed or extra doses. Patient denies s/s bleeding/bruising/swelling/SOB/chest pain  No blood in urine or stool. No dietary changes. No changes in medication/OTC agents/Herbals. No change in alcohol use or tobacco use. No change in activity level. Patient denies headaches/dizziness/lightheadedness/falls. No vomiting/diarrhea or acute illness. No Procedures scheduled in the future at this time. Assessment:   Lab Results   Component Value Date    INR 4.20 (H) 07/17/2018    INR 2.30 (H) 07/03/2018    INR 2.24 (H) 03/22/2018    PROTIME 22.1 10/03/2013    PROTIME 26.3 06/13/2013    PROTIME 26.3 06/13/2013     INR supratherapeutic   Recent Labs      07/17/18   1351   INR  4.20*       Plan:  Hold Coumadin 7/17-7/18 then continue Coumadin 5 mg PO daily. Recheck INR 1 weeks. Discussed plan with  and patient. Patient reminded to call the Anticoagulation Clinic with signs or symptoms of bleeding or with any medication changes. Patient given instructions utilizing the teach back method. Discussed/reviewed plan with DAWN Martell. Discharged ambulatory in no apparent distress. After visit summary printed and reviewed with patient.       Medications reviewed and updated on home medication list Yes    Influenza vaccine:     [] given    [x] declined   [x] received previously   [] plans to receive at a later time   [] refused    [x] documented in EPIC    Discharged pt alongside  - Sara Long PharmD, BCPS 7/17/2018 2:40 PM

## 2018-07-24 ENCOUNTER — HOSPITAL ENCOUNTER (OUTPATIENT)
Dept: PHARMACY | Age: 78
Setting detail: THERAPIES SERIES
Discharge: HOME | End: 2018-07-24
Payer: MEDICARE

## 2018-07-24 DIAGNOSIS — D15.1 MYXOMA OF HEART: ICD-10-CM

## 2018-07-24 DIAGNOSIS — I69.320 CVA, OLD, APHASIA: ICD-10-CM

## 2018-07-24 LAB — POC INR: 3.2 (ref 0.8–1.2)

## 2018-07-24 PROCEDURE — 99211 OFF/OP EST MAY X REQ PHY/QHP: CPT

## 2018-07-24 PROCEDURE — 36416 COLLJ CAPILLARY BLOOD SPEC: CPT

## 2018-07-24 PROCEDURE — 85610 PROTHROMBIN TIME: CPT

## 2018-07-24 NOTE — PROGRESS NOTES
Medication Management [facility]  Anticoagulation Clinic  [phone] (phone)  [phone] (fax)    Ms. Donna Bowling is a 78 y.o.  female with history of CVA, myxoma of heart who presents today for anticoagulation monitoring and adjustment.  verifies current tablet strength. Follows coumadin dosing calendar. Patient unable to answer most questions.  answered most questions. No missed or extra doses per husbands report. Patient denies s/s bleeding/bruising/swelling/SOB/chest pain  No blood in urine or stool per husbands report. No dietary changes per . No changes in medication/OTC agents/Herbals. No change in alcohol use or tobacco use. No change in activity level, wheelchair bound. Patient denies headaches/dizziness/lightheadedness/falls. No vomiting/diarrhea or acute illness. No procedures scheduled in the future at this time. Assessment:   Lab Results   Component Value Date    INR 3.20 (H) 07/24/2018    INR 4.20 (H) 07/17/2018    INR 2.30 (H) 07/03/2018    PROTIME 22.1 10/03/2013    PROTIME 26.3 06/13/2013    PROTIME 26.3 06/13/2013     INR supratherapeutic   Recent Labs      07/24/18   1506   INR  3.20*     Plan: POCT INR ordered and result reviewed with Kb, Pharm. D. Order received and verified to take coumadin 2.5 mg po today, then decrease Coumadin to 2.5 mg po MF, 5 mg po TWTHSS. Recheck INR 9 days. Patient reminded to call the Anticoagulation Clinic with signs or symptoms of bleeding or with any medication changes. Patient given instructions utilizing the teach back method. Discharged via wheelchair in no apparent distress. After visit summary printed and reviewed with patient.       Medications reviewed and updated on home medication list Yes    Influenza vaccine:     [] given    [] declined   [x] received previously   [] plans to receive at a later time   [] refused    [x] documented in EPIC

## 2018-07-27 ENCOUNTER — CARE COORDINATION (OUTPATIENT)
Dept: CARE COORDINATION | Age: 78
End: 2018-07-27

## 2018-08-02 ENCOUNTER — HOSPITAL ENCOUNTER (OUTPATIENT)
Age: 78
Discharge: HOME | End: 2018-08-02
Payer: MEDICARE

## 2018-08-02 ENCOUNTER — HOSPITAL ENCOUNTER (OUTPATIENT)
Dept: PHARMACY | Age: 78
Setting detail: THERAPIES SERIES
Discharge: HOME | End: 2018-08-02
Payer: MEDICARE

## 2018-08-02 DIAGNOSIS — D15.1 MYXOMA OF HEART: ICD-10-CM

## 2018-08-02 DIAGNOSIS — I69.320 CVA, OLD, APHASIA: ICD-10-CM

## 2018-08-02 LAB
HCT VFR BLD CALC: 33.1 % (ref 37–47)
HEMOGLOBIN: 9.8 GM/DL (ref 12–16)
POC INR: 3.9 (ref 0.8–1.2)

## 2018-08-02 PROCEDURE — 99211 OFF/OP EST MAY X REQ PHY/QHP: CPT | Performed by: PHARMACIST

## 2018-08-02 PROCEDURE — 85018 HEMOGLOBIN: CPT

## 2018-08-02 PROCEDURE — 85014 HEMATOCRIT: CPT

## 2018-08-02 PROCEDURE — 36415 COLL VENOUS BLD VENIPUNCTURE: CPT

## 2018-08-02 PROCEDURE — 85610 PROTHROMBIN TIME: CPT | Performed by: PHARMACIST

## 2018-08-02 PROCEDURE — 36416 COLLJ CAPILLARY BLOOD SPEC: CPT | Performed by: PHARMACIST

## 2018-08-09 ENCOUNTER — HOSPITAL ENCOUNTER (OUTPATIENT)
Dept: PHARMACY | Age: 78
Setting detail: THERAPIES SERIES
Discharge: HOME | End: 2018-08-09
Payer: MEDICARE

## 2018-08-09 DIAGNOSIS — D15.1 MYXOMA OF HEART: ICD-10-CM

## 2018-08-09 DIAGNOSIS — I69.320 CVA, OLD, APHASIA: ICD-10-CM

## 2018-08-09 LAB — POC INR: 3.1 (ref 0.8–1.2)

## 2018-08-09 PROCEDURE — 36416 COLLJ CAPILLARY BLOOD SPEC: CPT | Performed by: PHARMACIST

## 2018-08-09 PROCEDURE — 99211 OFF/OP EST MAY X REQ PHY/QHP: CPT | Performed by: PHARMACIST

## 2018-08-09 PROCEDURE — 85610 PROTHROMBIN TIME: CPT | Performed by: PHARMACIST

## 2018-08-09 NOTE — PROGRESS NOTES
Medication Management [facility]  Anticoagulation Clinic  [phone] (phone)  [phone] (fax)      Ms. Andriy Álvarez is a 78 y.o.  female with history of CVA who presents today for anticoagulation monitoring and adjustment. Patient verifies current dosing regimen and tablet strength. Taking according to the chart per sara. No missed or extra doses. Patient denies s/s bleeding/bruising/swelling/SOB/chest pain  No blood in urine or stool. No dietary changes. No changes in medication/OTC agents/Herbals. No change in alcohol use or tobacco use. No change in activity level. Patient denies headaches/dizziness/lightheadedness/falls. No vomiting/diarrhea or acute illness. No Procedures scheduled in the future at this time. Patient is unsure of most answers, but  denied majority of questions for her. Assessment:   Lab Results   Component Value Date    INR 3.10 (H) 08/09/2018    INR 3.90 (H) 08/02/2018    INR 3.20 (H) 07/24/2018    PROTIME 22.1 10/03/2013    PROTIME 26.3 06/13/2013    PROTIME 26.3 06/13/2013     INR supratherapeutic   Recent Labs      08/09/18   1537   INR  3.10*         Plan:  Decrease Coumadin 5mg MWF, 2.5mg TTHSS. Recheck INR 2 weeks. Patient reminded to call the Anticoagulation Clinic with signs or symptoms of bleeding or with any medication changes. Patient given instructions utilizing the teach back method. Discharged ambulatory in no apparent distress. After visit summary printed and reviewed with patient.       Medications reviewed and updated on home medication list Yes    Influenza vaccine:     [] given    [x] declined   [] received previously   [x] plans to receive at a later time   [] refused    [x] documented in EPIC

## 2018-08-23 ENCOUNTER — APPOINTMENT (OUTPATIENT)
Dept: PHARMACY | Age: 78
End: 2018-08-23
Payer: MEDICARE

## 2018-08-28 ENCOUNTER — HOSPITAL ENCOUNTER (OUTPATIENT)
Dept: PHARMACY | Age: 78
Setting detail: THERAPIES SERIES
Discharge: HOME | End: 2018-08-28
Payer: MEDICARE

## 2018-08-28 DIAGNOSIS — I69.320 CVA, OLD, APHASIA: ICD-10-CM

## 2018-08-28 DIAGNOSIS — D15.1 MYXOMA OF HEART: ICD-10-CM

## 2018-08-28 LAB — POC INR: 2.3 (ref 0.8–1.2)

## 2018-08-28 PROCEDURE — 85610 PROTHROMBIN TIME: CPT | Performed by: PHARMACIST

## 2018-08-28 PROCEDURE — 36416 COLLJ CAPILLARY BLOOD SPEC: CPT | Performed by: PHARMACIST

## 2018-08-28 PROCEDURE — 99211 OFF/OP EST MAY X REQ PHY/QHP: CPT | Performed by: PHARMACIST

## 2018-09-04 ENCOUNTER — HOSPITAL ENCOUNTER (OUTPATIENT)
Age: 78
Discharge: HOME | DRG: 389 | End: 2018-09-04
Payer: MEDICARE

## 2018-09-04 LAB
ALBUMIN SERPL-MCNC: 3.9 G/DL (ref 3.5–5.1)
ALP BLD-CCNC: 56 U/L (ref 38–126)
ALT SERPL-CCNC: 11 U/L (ref 11–66)
ANION GAP SERPL CALCULATED.3IONS-SCNC: 16 MEQ/L (ref 8–16)
AST SERPL-CCNC: 12 U/L (ref 5–40)
BASOPHILS # BLD: 1.3 %
BASOPHILS ABSOLUTE: 0.2 THOU/MM3 (ref 0–0.1)
BILIRUB SERPL-MCNC: 0.3 MG/DL (ref 0.3–1.2)
BILIRUBIN DIRECT: < 0.2 MG/DL (ref 0–0.3)
BUN BLDV-MCNC: 21 MG/DL (ref 7–22)
CALCIUM SERPL-MCNC: 9.2 MG/DL (ref 8.5–10.5)
CHLORIDE BLD-SCNC: 101 MEQ/L (ref 98–111)
CHOLESTEROL, TOTAL: 138 MG/DL (ref 100–199)
CO2: 22 MEQ/L (ref 23–33)
CREAT SERPL-MCNC: 1.1 MG/DL (ref 0.4–1.2)
EOSINOPHIL # BLD: 2.3 %
EOSINOPHILS ABSOLUTE: 0.3 THOU/MM3 (ref 0–0.4)
ERYTHROCYTE [DISTWIDTH] IN BLOOD BY AUTOMATED COUNT: 18.3 % (ref 11.5–14.5)
ERYTHROCYTE [DISTWIDTH] IN BLOOD BY AUTOMATED COUNT: 45.9 FL (ref 35–45)
GFR SERPL CREATININE-BSD FRML MDRD: 48 ML/MIN/1.73M2
GLUCOSE BLD-MCNC: 294 MG/DL (ref 70–108)
HCT VFR BLD CALC: 34.7 % (ref 37–47)
HDLC SERPL-MCNC: 34 MG/DL
HEMOGLOBIN: 10.2 GM/DL (ref 12–16)
IMMATURE GRANS (ABS): 0.03 THOU/MM3 (ref 0–0.07)
IMMATURE GRANULOCYTES: 0.2 %
LDL CHOLESTEROL CALCULATED: 64 MG/DL
LYMPHOCYTES # BLD: 23.7 %
LYMPHOCYTES ABSOLUTE: 2.9 THOU/MM3 (ref 1–4.8)
MCH RBC QN AUTO: 21.2 PG (ref 26–33)
MCHC RBC AUTO-ENTMCNC: 29.4 GM/DL (ref 32.2–35.5)
MCV RBC AUTO: 72 FL (ref 81–99)
MONOCYTES # BLD: 7.2 %
MONOCYTES ABSOLUTE: 0.9 THOU/MM3 (ref 0.4–1.3)
NUCLEATED RED BLOOD CELLS: 0 /100 WBC
PLATELET # BLD: 466 THOU/MM3 (ref 130–400)
PMV BLD AUTO: 11.3 FL (ref 9.4–12.4)
POTASSIUM SERPL-SCNC: 4.9 MEQ/L (ref 3.5–5.2)
RBC # BLD: 4.82 MILL/MM3 (ref 4.2–5.4)
SEG NEUTROPHILS: 65.3 %
SEGMENTED NEUTROPHILS ABSOLUTE COUNT: 8.1 THOU/MM3 (ref 1.8–7.7)
SODIUM BLD-SCNC: 139 MEQ/L (ref 135–145)
TOTAL PROTEIN: 7.8 G/DL (ref 6.1–8)
TRIGL SERPL-MCNC: 201 MG/DL (ref 0–199)
TSH SERPL DL<=0.05 MIU/L-ACNC: 3.23 UIU/ML (ref 0.4–4.2)
WBC # BLD: 12.4 THOU/MM3 (ref 4.8–10.8)

## 2018-09-04 PROCEDURE — 85025 COMPLETE CBC W/AUTO DIFF WBC: CPT

## 2018-09-04 PROCEDURE — 82248 BILIRUBIN DIRECT: CPT

## 2018-09-04 PROCEDURE — 80053 COMPREHEN METABOLIC PANEL: CPT

## 2018-09-04 PROCEDURE — 80061 LIPID PANEL: CPT

## 2018-09-04 PROCEDURE — 36415 COLL VENOUS BLD VENIPUNCTURE: CPT

## 2018-09-04 PROCEDURE — 84443 ASSAY THYROID STIM HORMONE: CPT

## 2018-09-06 ENCOUNTER — APPOINTMENT (OUTPATIENT)
Dept: CT IMAGING | Age: 78
DRG: 389 | End: 2018-09-06
Payer: MEDICARE

## 2018-09-06 ENCOUNTER — HOSPITAL ENCOUNTER (INPATIENT)
Age: 78
LOS: 3 days | Discharge: HOME | DRG: 389 | End: 2018-09-09
Attending: SURGERY | Admitting: SURGERY
Payer: MEDICARE

## 2018-09-06 DIAGNOSIS — K56.609 SMALL BOWEL OBSTRUCTION (HCC): Primary | ICD-10-CM

## 2018-09-06 LAB
ALBUMIN SERPL-MCNC: 3.8 G/DL (ref 3.5–5.1)
ALP BLD-CCNC: 58 U/L (ref 38–126)
ALT SERPL-CCNC: 12 U/L (ref 11–66)
AMORPHOUS: ABNORMAL
ANION GAP SERPL CALCULATED.3IONS-SCNC: 15 MEQ/L (ref 8–16)
APTT: 33.9 SECONDS (ref 22–38)
AST SERPL-CCNC: 16 U/L (ref 5–40)
BACTERIA: ABNORMAL
BASOPHILS # BLD: 1.5 %
BASOPHILS ABSOLUTE: 0.2 THOU/MM3 (ref 0–0.1)
BILIRUB SERPL-MCNC: 0.2 MG/DL (ref 0.3–1.2)
BILIRUBIN DIRECT: < 0.2 MG/DL (ref 0–0.3)
BILIRUBIN URINE: NEGATIVE
BLOOD, URINE: NEGATIVE
BUN BLDV-MCNC: 25 MG/DL (ref 7–22)
CALCIUM SERPL-MCNC: 9 MG/DL (ref 8.5–10.5)
CASTS: ABNORMAL /LPF
CASTS: ABNORMAL /LPF
CHARACTER, URINE: ABNORMAL
CHLORIDE BLD-SCNC: 105 MEQ/L (ref 98–111)
CO2: 20 MEQ/L (ref 23–33)
COLOR: YELLOW
CREAT SERPL-MCNC: 1.1 MG/DL (ref 0.4–1.2)
CRYSTALS: ABNORMAL
EKG ATRIAL RATE: 85 BPM
EKG P AXIS: 67 DEGREES
EKG P-R INTERVAL: 180 MS
EKG Q-T INTERVAL: 388 MS
EKG QRS DURATION: 86 MS
EKG QTC CALCULATION (BAZETT): 461 MS
EKG R AXIS: -42 DEGREES
EKG T AXIS: 78 DEGREES
EKG VENTRICULAR RATE: 85 BPM
EOSINOPHIL # BLD: 3.2 %
EOSINOPHILS ABSOLUTE: 0.4 THOU/MM3 (ref 0–0.4)
EPITHELIAL CELLS, UA: ABNORMAL /HPF
ERYTHROCYTE [DISTWIDTH] IN BLOOD BY AUTOMATED COUNT: 18.3 % (ref 11.5–14.5)
ERYTHROCYTE [DISTWIDTH] IN BLOOD BY AUTOMATED COUNT: 45.9 FL (ref 35–45)
GFR SERPL CREATININE-BSD FRML MDRD: 48 ML/MIN/1.73M2
GLUCOSE BLD-MCNC: 184 MG/DL (ref 70–108)
GLUCOSE BLD-MCNC: 240 MG/DL (ref 70–108)
GLUCOSE BLD-MCNC: 293 MG/DL (ref 70–108)
GLUCOSE BLD-MCNC: 347 MG/DL (ref 70–108)
GLUCOSE, URINE: >= 1000 MG/DL
HCT VFR BLD CALC: 33.3 % (ref 37–47)
HEMOGLOBIN: 9.9 GM/DL (ref 12–16)
IMMATURE GRANS (ABS): 0.06 THOU/MM3 (ref 0–0.07)
IMMATURE GRANULOCYTES: 0.4 %
INR BLD: 1.63 (ref 0.85–1.13)
KETONES, URINE: ABNORMAL
LACTIC ACID: 2 MMOL/L (ref 0.5–2.2)
LEUKOCYTE ESTERASE, URINE: NEGATIVE
LIPASE: 30.9 U/L (ref 5.6–51.3)
LYMPHOCYTES # BLD: 19.5 %
LYMPHOCYTES ABSOLUTE: 2.7 THOU/MM3 (ref 1–4.8)
MAGNESIUM: 1.9 MG/DL (ref 1.6–2.4)
MCH RBC QN AUTO: 21.2 PG (ref 26–33)
MCHC RBC AUTO-ENTMCNC: 29.7 GM/DL (ref 32.2–35.5)
MCV RBC AUTO: 71.3 FL (ref 81–99)
MISCELLANEOUS LAB TEST RESULT: ABNORMAL
MONOCYTES # BLD: 6.7 %
MONOCYTES ABSOLUTE: 0.9 THOU/MM3 (ref 0.4–1.3)
NITRITE, URINE: NEGATIVE
NUCLEATED RED BLOOD CELLS: 0 /100 WBC
OSMOLALITY CALCULATION: 297.6 MOSMOL/KG (ref 275–300)
PH UA: 5
PLATELET # BLD: 434 THOU/MM3 (ref 130–400)
PMV BLD AUTO: 10.8 FL (ref 9.4–12.4)
POTASSIUM SERPL-SCNC: 4.8 MEQ/L (ref 3.5–5.2)
PROTEIN UA: 30 MG/DL
RBC # BLD: 4.67 MILL/MM3 (ref 4.2–5.4)
RBC URINE: ABNORMAL /HPF
RENAL EPITHELIAL, UA: ABNORMAL
SEG NEUTROPHILS: 68.7 %
SEGMENTED NEUTROPHILS ABSOLUTE COUNT: 9.6 THOU/MM3 (ref 1.8–7.7)
SODIUM BLD-SCNC: 140 MEQ/L (ref 135–145)
SPECIFIC GRAVITY UA: 1.03 (ref 1–1.03)
TOTAL PROTEIN: 7.5 G/DL (ref 6.1–8)
UROBILINOGEN, URINE: 0.2 EU/DL
WBC # BLD: 14 THOU/MM3 (ref 4.8–10.8)
WBC UA: ABNORMAL /HPF
YEAST: ABNORMAL

## 2018-09-06 PROCEDURE — 6370000000 HC RX 637 (ALT 250 FOR IP): Performed by: SURGERY

## 2018-09-06 PROCEDURE — 99222 1ST HOSP IP/OBS MODERATE 55: CPT | Performed by: SURGERY

## 2018-09-06 PROCEDURE — 74177 CT ABD & PELVIS W/CONTRAST: CPT

## 2018-09-06 PROCEDURE — 93010 ELECTROCARDIOGRAM REPORT: CPT | Performed by: NUCLEAR MEDICINE

## 2018-09-06 PROCEDURE — 6360000002 HC RX W HCPCS: Performed by: NURSE PRACTITIONER

## 2018-09-06 PROCEDURE — 96374 THER/PROPH/DIAG INJ IV PUSH: CPT

## 2018-09-06 PROCEDURE — 1200000000 HC SEMI PRIVATE

## 2018-09-06 PROCEDURE — 2580000003 HC RX 258: Performed by: NURSE PRACTITIONER

## 2018-09-06 PROCEDURE — 85730 THROMBOPLASTIN TIME PARTIAL: CPT

## 2018-09-06 PROCEDURE — 36415 COLL VENOUS BLD VENIPUNCTURE: CPT

## 2018-09-06 PROCEDURE — 2709999900 HC NON-CHARGEABLE SUPPLY

## 2018-09-06 PROCEDURE — 2580000003 HC RX 258: Performed by: SURGERY

## 2018-09-06 PROCEDURE — 83690 ASSAY OF LIPASE: CPT

## 2018-09-06 PROCEDURE — 85025 COMPLETE CBC W/AUTO DIFF WBC: CPT

## 2018-09-06 PROCEDURE — 80053 COMPREHEN METABOLIC PANEL: CPT

## 2018-09-06 PROCEDURE — 83605 ASSAY OF LACTIC ACID: CPT

## 2018-09-06 PROCEDURE — 99285 EMERGENCY DEPT VISIT HI MDM: CPT

## 2018-09-06 PROCEDURE — 6360000004 HC RX CONTRAST MEDICATION: Performed by: NURSE PRACTITIONER

## 2018-09-06 PROCEDURE — 93005 ELECTROCARDIOGRAM TRACING: CPT | Performed by: NURSE PRACTITIONER

## 2018-09-06 PROCEDURE — 82948 REAGENT STRIP/BLOOD GLUCOSE: CPT

## 2018-09-06 PROCEDURE — 85610 PROTHROMBIN TIME: CPT

## 2018-09-06 PROCEDURE — 82248 BILIRUBIN DIRECT: CPT

## 2018-09-06 PROCEDURE — 83735 ASSAY OF MAGNESIUM: CPT

## 2018-09-06 PROCEDURE — A6250 SKIN SEAL PROTECT MOISTURIZR: HCPCS

## 2018-09-06 PROCEDURE — 81001 URINALYSIS AUTO W/SCOPE: CPT

## 2018-09-06 RX ORDER — SODIUM CHLORIDE 9 MG/ML
INJECTION, SOLUTION INTRAVENOUS CONTINUOUS
Status: DISCONTINUED | OUTPATIENT
Start: 2018-09-06 | End: 2018-09-09 | Stop reason: HOSPADM

## 2018-09-06 RX ORDER — SODIUM CHLORIDE 0.9 % (FLUSH) 0.9 %
10 SYRINGE (ML) INJECTION EVERY 12 HOURS SCHEDULED
Status: DISCONTINUED | OUTPATIENT
Start: 2018-09-06 | End: 2018-09-06

## 2018-09-06 RX ORDER — SODIUM CHLORIDE 0.9 % (FLUSH) 0.9 %
10 SYRINGE (ML) INJECTION PRN
Status: DISCONTINUED | OUTPATIENT
Start: 2018-09-06 | End: 2018-09-09 | Stop reason: HOSPADM

## 2018-09-06 RX ORDER — MORPHINE SULFATE 2 MG/ML
2 INJECTION, SOLUTION INTRAMUSCULAR; INTRAVENOUS ONCE
Status: COMPLETED | OUTPATIENT
Start: 2018-09-06 | End: 2018-09-06

## 2018-09-06 RX ORDER — SIMVASTATIN 20 MG
20 TABLET ORAL NIGHTLY
Status: DISCONTINUED | OUTPATIENT
Start: 2018-09-06 | End: 2018-09-09 | Stop reason: HOSPADM

## 2018-09-06 RX ORDER — ACETAMINOPHEN 325 MG/1
650 TABLET ORAL EVERY 4 HOURS PRN
Status: DISCONTINUED | OUTPATIENT
Start: 2018-09-06 | End: 2018-09-06

## 2018-09-06 RX ORDER — ACETAMINOPHEN 325 MG/1
650 TABLET ORAL EVERY 4 HOURS PRN
Status: DISCONTINUED | OUTPATIENT
Start: 2018-09-06 | End: 2018-09-09 | Stop reason: HOSPADM

## 2018-09-06 RX ORDER — LISINOPRIL 40 MG/1
40 TABLET ORAL DAILY
Status: DISCONTINUED | OUTPATIENT
Start: 2018-09-06 | End: 2018-09-09 | Stop reason: HOSPADM

## 2018-09-06 RX ORDER — SODIUM CHLORIDE 0.9 % (FLUSH) 0.9 %
10 SYRINGE (ML) INJECTION EVERY 12 HOURS SCHEDULED
Status: DISCONTINUED | OUTPATIENT
Start: 2018-09-06 | End: 2018-09-09 | Stop reason: HOSPADM

## 2018-09-06 RX ORDER — MORPHINE SULFATE 2 MG/ML
2 INJECTION, SOLUTION INTRAMUSCULAR; INTRAVENOUS
Status: DISCONTINUED | OUTPATIENT
Start: 2018-09-06 | End: 2018-09-09 | Stop reason: HOSPADM

## 2018-09-06 RX ORDER — CITALOPRAM 20 MG/1
10 TABLET ORAL DAILY
Status: DISCONTINUED | OUTPATIENT
Start: 2018-09-06 | End: 2018-09-09 | Stop reason: HOSPADM

## 2018-09-06 RX ORDER — ONDANSETRON 2 MG/ML
4 INJECTION INTRAMUSCULAR; INTRAVENOUS EVERY 6 HOURS PRN
Status: DISCONTINUED | OUTPATIENT
Start: 2018-09-06 | End: 2018-09-09 | Stop reason: HOSPADM

## 2018-09-06 RX ORDER — DEXTROSE MONOHYDRATE 25 G/50ML
12.5 INJECTION, SOLUTION INTRAVENOUS PRN
Status: DISCONTINUED | OUTPATIENT
Start: 2018-09-06 | End: 2018-09-09 | Stop reason: HOSPADM

## 2018-09-06 RX ORDER — OXYBUTYNIN CHLORIDE 10 MG/1
1 TABLET, EXTENDED RELEASE ORAL DAILY
Status: DISCONTINUED | OUTPATIENT
Start: 2018-09-06 | End: 2018-09-09 | Stop reason: HOSPADM

## 2018-09-06 RX ORDER — MORPHINE SULFATE 4 MG/ML
4 INJECTION, SOLUTION INTRAMUSCULAR; INTRAVENOUS
Status: DISCONTINUED | OUTPATIENT
Start: 2018-09-06 | End: 2018-09-09 | Stop reason: HOSPADM

## 2018-09-06 RX ORDER — SODIUM CHLORIDE 0.9 % (FLUSH) 0.9 %
10 SYRINGE (ML) INJECTION PRN
Status: DISCONTINUED | OUTPATIENT
Start: 2018-09-06 | End: 2018-09-06

## 2018-09-06 RX ORDER — AMLODIPINE BESYLATE AND BENAZEPRIL HYDROCHLORIDE 10; 20 MG/1; MG/1
1 CAPSULE ORAL DAILY
Status: DISCONTINUED | OUTPATIENT
Start: 2018-09-06 | End: 2018-09-07

## 2018-09-06 RX ORDER — NICOTINE POLACRILEX 4 MG
15 LOZENGE BUCCAL PRN
Status: DISCONTINUED | OUTPATIENT
Start: 2018-09-06 | End: 2018-09-09 | Stop reason: HOSPADM

## 2018-09-06 RX ORDER — 0.9 % SODIUM CHLORIDE 0.9 %
500 INTRAVENOUS SOLUTION INTRAVENOUS ONCE
Status: COMPLETED | OUTPATIENT
Start: 2018-09-06 | End: 2018-09-06

## 2018-09-06 RX ORDER — DEXTROSE MONOHYDRATE 50 MG/ML
100 INJECTION, SOLUTION INTRAVENOUS PRN
Status: DISCONTINUED | OUTPATIENT
Start: 2018-09-06 | End: 2018-09-09 | Stop reason: HOSPADM

## 2018-09-06 RX ADMIN — LISINOPRIL 40 MG: 40 TABLET ORAL at 18:23

## 2018-09-06 RX ADMIN — SODIUM CHLORIDE: 9 INJECTION, SOLUTION INTRAVENOUS at 18:22

## 2018-09-06 RX ADMIN — CITALOPRAM 10 MG: 20 TABLET, FILM COATED ORAL at 21:24

## 2018-09-06 RX ADMIN — MORPHINE SULFATE 2 MG: 2 INJECTION, SOLUTION INTRAMUSCULAR; INTRAVENOUS at 08:47

## 2018-09-06 RX ADMIN — OXYBUTYNIN CHLORIDE 10 MG: 10 TABLET, EXTENDED RELEASE ORAL at 21:24

## 2018-09-06 RX ADMIN — IOPAMIDOL 80 ML: 755 INJECTION, SOLUTION INTRAVENOUS at 10:11

## 2018-09-06 RX ADMIN — SODIUM CHLORIDE 500 ML: 9 INJECTION, SOLUTION INTRAVENOUS at 08:36

## 2018-09-06 RX ADMIN — SIMVASTATIN 20 MG: 20 TABLET, FILM COATED ORAL at 21:24

## 2018-09-06 ASSESSMENT — PAIN SCALES - GENERAL
PAINLEVEL_OUTOF10: 0

## 2018-09-06 ASSESSMENT — PAIN DESCRIPTION - FREQUENCY
FREQUENCY: CONTINUOUS

## 2018-09-06 ASSESSMENT — PAIN DESCRIPTION - ORIENTATION
ORIENTATION: OTHER (COMMENT)
ORIENTATION: OTHER (COMMENT)

## 2018-09-06 ASSESSMENT — PAIN DESCRIPTION - LOCATION
LOCATION: ABDOMEN

## 2018-09-06 ASSESSMENT — PAIN DESCRIPTION - PAIN TYPE
TYPE: ACUTE PAIN

## 2018-09-06 ASSESSMENT — PAIN DESCRIPTION - DESCRIPTORS: DESCRIPTORS: PATIENT UNABLE TO DESCRIBE

## 2018-09-06 ASSESSMENT — ENCOUNTER SYMPTOMS: DIARRHEA: 1

## 2018-09-06 NOTE — ED NOTES
No acute changes noted. VSS. Patient and  updated on plan of care with verbalized understanding. Side rails up x2. Call light within reach.       Karina Cristobal RN  09/06/18 1087

## 2018-09-06 NOTE — ED NOTES
Patient with O2 sat=92-93% on RA. O2 applied at 2L NC with improvement to O2 sat=94-95%. Patient denies SOB.       Kait Ivey, JEISON  09/06/18 0804

## 2018-09-06 NOTE — H&P
Range (24h):Temp: 97.7 °F (36.5 °C) Temp  Av.5 °F (36.4 °C)  Min: 96.6 °F (35.9 °C)  Max: 98 °F (36.7 °C)  BP Range (24N): Systolic (89YDW), MUH:844 , Min:145 , ZST:088     Diastolic (27VMO), RBU:34, Min:52, Max:88    Pulse Range (24h): Pulse  Av.7  Min: 68  Max: 91  Respiration Range (24h): Resp  Av.3  Min: 17  Max: 24  Current Pulse Ox (24h):  SpO2: 97 %  Pulse Ox Range (24h):  SpO2  Av.6 %  Min: 93 %  Max: 98 %  Oxygen Amount and Delivery: O2 Flow Rate (L/min): 2 L/min  CONSTITUTIONAL: Alert and oriented times 3, no acute distress and cooperative to examination with proper mood and affect. SKIN: Skin color, texture, turgor normal. No rashes or lesions. LYMPH: no cervical nodes, no inguinal nodes  HEENT: Head is normocephalic, atraumatic. EOMI, PERRLA. NECK: Supple, symmetrical, trachea midline, no adenopathy, thyroid symmetric, not enlarged and no tenderness, skin normal.  CHEST/LUNGS: chest symmetric with normal A/P diameter, normal respiratory rate and rhythm, lungs clear to auscultation without wheezes, rales or rhonchi. No accessory muscle use. Scars None   CARDIOVASCULAR: Heart sounds are normal.  Regular rate and rhythm without murmur, gallop or rub. Normal S1 and S2. Carotid and femoral pulses 2+/4 and equal bilaterally. ABDOMEN: obese right upper quadrant scar(s) present. Decreased bowel sounds. No bruits. soft, mildly distended, no masses or organomegaly. no evidence of hernia. Percussion: Normal without hepatosplenomegally. Tenderness: very mild in lower abdomen and only with deep palpation. RECTAL: deferred, not clinically indicated  NEUROLOGIC: Right hemiparesis  EXTREMITIES: no cyanosis, no clubbing and no edema.   LABS:     Recent Labs      18   1158  18   0833  18   0840   WBC  12.4*  14.0*   --    HGB  10.2*  9.9*   --    HCT  34.7*  33.3*   --    PLT  466*  434*   --    NA  139  140   --    K  4.9  4.8   --    CL  101  105   --    CO2  22*  20*   --

## 2018-09-06 NOTE — ED NOTES
Bed: 011A  Expected date: 9/6/18  Expected time: 7:53 AM  Means of arrival: [facility]  Comments:     Kvng Monte RN  09/06/18 9195

## 2018-09-06 NOTE — ED TRIAGE NOTES
Patient presents to the ED via EMS with complaint of abdominal pain. Patient unable to describe location or intensity of pain. Patient's  arrived at bedside and states that the patient went to the restroom today and then when she came back to bed, she began to complain of abdominal pain. Patient's  also states that the patient has had intermittent diarrhea for the past 2 months. Patient with history of CVA and has complications with communication. Patient with R sided weakness from previous stroke.

## 2018-09-06 NOTE — ED PROVIDER NOTES
Basophils # 0.2 (*)     All other components within normal limits   BASIC METABOLIC PANEL - Abnormal; Notable for the following:     CO2 20 (*)     Glucose 347 (*)     BUN 25 (*)     All other components within normal limits   HEPATIC FUNCTION PANEL - Abnormal; Notable for the following: Total Bilirubin 0.2 (*)     All other components within normal limits   URINALYSIS WITH MICROSCOPIC - Abnormal; Notable for the following:     Glucose, Urine >= 1000 (*)     Ketones, Urine TRACE (*)     Protein, UA 30 (*)     All other components within normal limits   PROTIME-INR - Abnormal; Notable for the following:     INR 1.63 (*)     All other components within normal limits   GLOMERULAR FILTRATION RATE, ESTIMATED - Abnormal; Notable for the following:     Est, Glom Filt Rate 48 (*)     All other components within normal limits   POCT GLUCOSE - Abnormal; Notable for the following:     POC Glucose 293 (*)     All other components within normal limits   LIPASE   MAGNESIUM   LACTIC ACID, PLASMA   APTT   ANION GAP   OSMOLALITY       EMERGENCY DEPARTMENT COURSE:   Vitals:    Vitals:    09/06/18 0854 09/06/18 0952 09/06/18 1114 09/06/18 1315   BP: (!) 155/57 (!) 159/52 (!) 151/56 (!) 205/88   Pulse: 79 68 71 91   Resp: 24 22 17 18   Temp:    96.6 °F (35.9 °C)   TempSrc:    Oral   SpO2: 97% 97% 97% 98%   Weight:    213 lb 9.6 oz (96.9 kg)   Height:    5' 8\" (1.727 m)       MDM  The patient was seen and evaluated in the ED today following abdominal pain and diarrhea. Within the department I observed the patient's vital signs to show hypertensive otherwise normal.  On exam, I appreciated Tinkling bowel sounds. Tense abdomen. Diffuse tenderness of the abdomen. Radiologic studies within the department revealed There is a mildly dilated loop of small bowel within the right lower quadrant. It also contains a gas fluid level. This is nonspecific however, closed loop obstruction cannot be completely excluded. Hepatic steatosis.  There my words and actions.     Kem Kirk, CNP 09/06/18 1:52 PM    Vivian Joe. Yelena, APRN - [employer]         [employer], APRN - CNP  09/06/18 7529

## 2018-09-06 NOTE — PROGRESS NOTES
Pharmacy Medication History Note      List of current medications patient is taking is complete. Source of information: patient's     Changes made to medication list:  Medications removed (include reason, ex. therapy complete or physician discontinued):  · none    Medications added/doses adjusted:  · Lantus 25 units subq HS - clarified to lantus 18 units subq HS    Other notes (ex. Recent course of antibiotics, Coumadin dosing):    · Denies use of other OTC or herbal medications.       Allergies reviewed      Electronically signed by Venice Stanley on 9/6/2018 at 1:50 PM

## 2018-09-07 ENCOUNTER — APPOINTMENT (OUTPATIENT)
Dept: GENERAL RADIOLOGY | Age: 78
DRG: 389 | End: 2018-09-07
Payer: MEDICARE

## 2018-09-07 LAB
ANION GAP SERPL CALCULATED.3IONS-SCNC: 13 MEQ/L (ref 8–16)
BASOPHILS # BLD: 1.4 %
BASOPHILS ABSOLUTE: 0.2 THOU/MM3 (ref 0–0.1)
BUN BLDV-MCNC: 16 MG/DL (ref 7–22)
CALCIUM SERPL-MCNC: 8.2 MG/DL (ref 8.5–10.5)
CHLORIDE BLD-SCNC: 108 MEQ/L (ref 98–111)
CO2: 19 MEQ/L (ref 23–33)
CREAT SERPL-MCNC: 0.9 MG/DL (ref 0.4–1.2)
EOSINOPHIL # BLD: 5.2 %
EOSINOPHILS ABSOLUTE: 0.6 THOU/MM3 (ref 0–0.4)
ERYTHROCYTE [DISTWIDTH] IN BLOOD BY AUTOMATED COUNT: 18.4 % (ref 11.5–14.5)
ERYTHROCYTE [DISTWIDTH] IN BLOOD BY AUTOMATED COUNT: 47.4 FL (ref 35–45)
GFR SERPL CREATININE-BSD FRML MDRD: 61 ML/MIN/1.73M2
GLUCOSE BLD-MCNC: 177 MG/DL (ref 70–108)
GLUCOSE BLD-MCNC: 192 MG/DL (ref 70–108)
GLUCOSE BLD-MCNC: 198 MG/DL (ref 70–108)
GLUCOSE BLD-MCNC: 205 MG/DL (ref 70–108)
GLUCOSE BLD-MCNC: 212 MG/DL (ref 70–108)
GLUCOSE BLD-MCNC: 216 MG/DL (ref 70–108)
GLUCOSE BLD-MCNC: 272 MG/DL (ref 70–108)
HCT VFR BLD CALC: 30.9 % (ref 37–47)
HEMOGLOBIN: 9 GM/DL (ref 12–16)
IMMATURE GRANS (ABS): 0.03 THOU/MM3 (ref 0–0.07)
IMMATURE GRANULOCYTES: 0.3 %
INR BLD: 1.69 (ref 0.85–1.13)
LYMPHOCYTES # BLD: 29.9 %
LYMPHOCYTES ABSOLUTE: 3.3 THOU/MM3 (ref 1–4.8)
MCH RBC QN AUTO: 21.1 PG (ref 26–33)
MCHC RBC AUTO-ENTMCNC: 29.1 GM/DL (ref 32.2–35.5)
MCV RBC AUTO: 72.4 FL (ref 81–99)
MONOCYTES # BLD: 6.4 %
MONOCYTES ABSOLUTE: 0.7 THOU/MM3 (ref 0.4–1.3)
NUCLEATED RED BLOOD CELLS: 0 /100 WBC
PLATELET # BLD: 409 THOU/MM3 (ref 130–400)
PMV BLD AUTO: 10.8 FL (ref 9.4–12.4)
POTASSIUM REFLEX MAGNESIUM: 4.4 MEQ/L (ref 3.5–5.2)
RBC # BLD: 4.27 MILL/MM3 (ref 4.2–5.4)
SEG NEUTROPHILS: 56.8 %
SEGMENTED NEUTROPHILS ABSOLUTE COUNT: 6.2 THOU/MM3 (ref 1.8–7.7)
SODIUM BLD-SCNC: 140 MEQ/L (ref 135–145)
WBC # BLD: 10.9 THOU/MM3 (ref 4.8–10.8)

## 2018-09-07 PROCEDURE — 82948 REAGENT STRIP/BLOOD GLUCOSE: CPT

## 2018-09-07 PROCEDURE — 36415 COLL VENOUS BLD VENIPUNCTURE: CPT

## 2018-09-07 PROCEDURE — 99232 SBSQ HOSP IP/OBS MODERATE 35: CPT | Performed by: SURGERY

## 2018-09-07 PROCEDURE — 74018 RADEX ABDOMEN 1 VIEW: CPT

## 2018-09-07 PROCEDURE — 80048 BASIC METABOLIC PNL TOTAL CA: CPT

## 2018-09-07 PROCEDURE — 2580000003 HC RX 258: Performed by: SURGERY

## 2018-09-07 PROCEDURE — 6370000000 HC RX 637 (ALT 250 FOR IP): Performed by: SURGERY

## 2018-09-07 PROCEDURE — 85025 COMPLETE CBC W/AUTO DIFF WBC: CPT

## 2018-09-07 PROCEDURE — 2709999900 HC NON-CHARGEABLE SUPPLY

## 2018-09-07 PROCEDURE — 85610 PROTHROMBIN TIME: CPT

## 2018-09-07 PROCEDURE — 1200000000 HC SEMI PRIVATE

## 2018-09-07 RX ORDER — AMLODIPINE BESYLATE 10 MG/1
10 TABLET ORAL DAILY
Status: DISCONTINUED | OUTPATIENT
Start: 2018-09-07 | End: 2018-09-09 | Stop reason: HOSPADM

## 2018-09-07 RX ORDER — LISINOPRIL 20 MG/1
20 TABLET ORAL DAILY
Status: DISCONTINUED | OUTPATIENT
Start: 2018-09-07 | End: 2018-09-09 | Stop reason: HOSPADM

## 2018-09-07 RX ADMIN — SODIUM CHLORIDE: 9 INJECTION, SOLUTION INTRAVENOUS at 14:49

## 2018-09-07 RX ADMIN — OXYBUTYNIN CHLORIDE 10 MG: 10 TABLET, EXTENDED RELEASE ORAL at 09:05

## 2018-09-07 RX ADMIN — LISINOPRIL 40 MG: 40 TABLET ORAL at 09:05

## 2018-09-07 RX ADMIN — Medication 4 UNITS: at 06:41

## 2018-09-07 RX ADMIN — CITALOPRAM 10 MG: 20 TABLET, FILM COATED ORAL at 09:04

## 2018-09-07 RX ADMIN — AMLODIPINE BESYLATE 10 MG: 10 TABLET ORAL at 13:12

## 2018-09-07 RX ADMIN — LISINOPRIL 20 MG: 20 TABLET ORAL at 13:12

## 2018-09-07 RX ADMIN — SIMVASTATIN 20 MG: 20 TABLET, FILM COATED ORAL at 20:45

## 2018-09-07 ASSESSMENT — PAIN SCALES - GENERAL
PAINLEVEL_OUTOF10: 0

## 2018-09-08 LAB
ANION GAP SERPL CALCULATED.3IONS-SCNC: 11 MEQ/L (ref 8–16)
BUN BLDV-MCNC: 9 MG/DL (ref 7–22)
CALCIUM SERPL-MCNC: 8 MG/DL (ref 8.5–10.5)
CHLORIDE BLD-SCNC: 107 MEQ/L (ref 98–111)
CO2: 21 MEQ/L (ref 23–33)
CREAT SERPL-MCNC: 0.7 MG/DL (ref 0.4–1.2)
ERYTHROCYTE [DISTWIDTH] IN BLOOD BY AUTOMATED COUNT: 18.6 % (ref 11.5–14.5)
ERYTHROCYTE [DISTWIDTH] IN BLOOD BY AUTOMATED COUNT: 47.8 FL (ref 35–45)
GFR SERPL CREATININE-BSD FRML MDRD: 81 ML/MIN/1.73M2
GLUCOSE BLD-MCNC: 198 MG/DL (ref 70–108)
GLUCOSE BLD-MCNC: 221 MG/DL (ref 70–108)
GLUCOSE BLD-MCNC: 234 MG/DL (ref 70–108)
GLUCOSE BLD-MCNC: 267 MG/DL (ref 70–108)
GLUCOSE BLD-MCNC: 270 MG/DL (ref 70–108)
HCT VFR BLD CALC: 31.5 % (ref 37–47)
HEMOGLOBIN: 9.1 GM/DL (ref 12–16)
MCH RBC QN AUTO: 21.1 PG (ref 26–33)
MCHC RBC AUTO-ENTMCNC: 28.9 GM/DL (ref 32.2–35.5)
MCV RBC AUTO: 73.1 FL (ref 81–99)
PLATELET # BLD: 374 THOU/MM3 (ref 130–400)
PMV BLD AUTO: 11 FL (ref 9.4–12.4)
POTASSIUM SERPL-SCNC: 4.2 MEQ/L (ref 3.5–5.2)
RBC # BLD: 4.31 MILL/MM3 (ref 4.2–5.4)
SODIUM BLD-SCNC: 139 MEQ/L (ref 135–145)
WBC # BLD: 9.3 THOU/MM3 (ref 4.8–10.8)

## 2018-09-08 PROCEDURE — 99232 SBSQ HOSP IP/OBS MODERATE 35: CPT | Performed by: SURGERY

## 2018-09-08 PROCEDURE — 6370000000 HC RX 637 (ALT 250 FOR IP): Performed by: SURGERY

## 2018-09-08 PROCEDURE — 1200000000 HC SEMI PRIVATE

## 2018-09-08 PROCEDURE — 36415 COLL VENOUS BLD VENIPUNCTURE: CPT

## 2018-09-08 PROCEDURE — 80048 BASIC METABOLIC PNL TOTAL CA: CPT

## 2018-09-08 PROCEDURE — 85027 COMPLETE CBC AUTOMATED: CPT

## 2018-09-08 PROCEDURE — 2709999900 HC NON-CHARGEABLE SUPPLY

## 2018-09-08 PROCEDURE — 6360000002 HC RX W HCPCS: Performed by: SURGERY

## 2018-09-08 PROCEDURE — 82948 REAGENT STRIP/BLOOD GLUCOSE: CPT

## 2018-09-08 RX ADMIN — LISINOPRIL 40 MG: 40 TABLET ORAL at 09:50

## 2018-09-08 RX ADMIN — AMLODIPINE BESYLATE 10 MG: 10 TABLET ORAL at 09:50

## 2018-09-08 RX ADMIN — OXYBUTYNIN CHLORIDE 10 MG: 10 TABLET, EXTENDED RELEASE ORAL at 09:50

## 2018-09-08 RX ADMIN — MORPHINE SULFATE 2 MG: 2 INJECTION, SOLUTION INTRAMUSCULAR; INTRAVENOUS at 20:05

## 2018-09-08 RX ADMIN — CITALOPRAM 10 MG: 20 TABLET, FILM COATED ORAL at 09:50

## 2018-09-08 RX ADMIN — SIMVASTATIN 20 MG: 20 TABLET, FILM COATED ORAL at 20:05

## 2018-09-08 RX ADMIN — LISINOPRIL 20 MG: 20 TABLET ORAL at 09:51

## 2018-09-08 ASSESSMENT — PAIN DESCRIPTION - PAIN TYPE: TYPE: ACUTE PAIN

## 2018-09-08 ASSESSMENT — PAIN SCALES - GENERAL
PAINLEVEL_OUTOF10: 0
PAINLEVEL_OUTOF10: 4
PAINLEVEL_OUTOF10: 0

## 2018-09-08 ASSESSMENT — PAIN DESCRIPTION - LOCATION: LOCATION: ABDOMEN

## 2018-09-08 ASSESSMENT — PAIN DESCRIPTION - ORIENTATION: ORIENTATION: OTHER (COMMENT)

## 2018-09-08 ASSESSMENT — PAIN DESCRIPTION - FREQUENCY: FREQUENCY: INTERMITTENT

## 2018-09-08 NOTE — PLAN OF CARE
Problem: Falls - Risk of:  Goal: Will remain free from falls  Will remain free from falls   Outcome: Ongoing  Bed alarm on. Patient doesn't get out of bed  Goal: Absence of physical injury  Absence of physical injury   Outcome: Met This Shift  Patient did not hurt self    Problem: Neurological  Goal: Maximum potential motor/sensory/cognitive function  Outcome: Ongoing  Right sided weakness affected from cva    Problem: Bowel/Gastric:  Goal: Ability to achieve a regular elimination pattern will improve  Ability to achieve a regular elimination pattern will improve  Outcome: Ongoing  still having loose stools  Goal: Control of bowel function will improve  Control of bowel function will improve  Outcome: Ongoing  incontinet of stool. Comments: Care plan reviewed with patient . Patient  verbalize understanding of the plan of care and contribute to goal setting.

## 2018-09-08 NOTE — PROGRESS NOTES
Yonny Hoffmann  Daily Progress Note  Pt Name: Te S Lennox Record Number: [id number]  Date of Birth 1940   Today's Date: 9/8/2018  Chief complaint: SBO   ASSESSMENT   1. Hospital day # 2   2. SBO likely due to adhesions from previous surgery  3. CVA with expressive aphasia and right hemiparesis  4. Coumadin therapy on admission. 5. CAD, HTN   has a past medical history of CAD (coronary artery disease); Cerebrovascular disease; Depression; Hyperlipidemia; Hypertension; Self-care deficit for dressing and grooming; Self-care deficit for medication administration; Type II or unspecified type diabetes mellitus without mention of complication, not stated as uncontrolled; and Unspecified cerebral artery occlusion with cerebral infarction. PLAN   1. Decrease IV fluids  2. Analgesics and antiemetics as needed  3. Full liquid diet advancing to ADA as tolerated  4. Increase activity  5. F/u on stool cultures  SUBJECTIVE   Scott Clark is doing better. She denies any nausea or vomiting, has passed flatus and had a bowel movement. She is tolerating a DIET FULL LIQUID; Carb Control: 4 carb choices (60 gms)/meal. Her pain is well controlled on current medications. CURRENT MEDICATIONS   Scheduled Meds:   amLODIPine  10 mg Oral Daily    And    lisinopril  20 mg Oral Daily    insulin lispro  0-12 Units Subcutaneous 4x Daily AC & HS    citalopram  10 mg Oral Daily    lisinopril  40 mg Oral Daily    oxybutynin  1 tablet Oral Daily    simvastatin  20 mg Oral Nightly    sodium chloride flush  10 mL Intravenous 2 times per day     Continuous Infusions:   sodium chloride 100 mL/hr at 09/07/18 1449    dextrose       PRN Meds:.sodium chloride flush, acetaminophen, morphine **OR** morphine, ondansetron, glucose, dextrose, dextrose  OBJECTIVE   CURRENT VITALS:  height is 5' 8\" (1.727 m) and weight is 213 lb 9.6 oz (96.9 kg). Her oral temperature is 97.2 °F (36.2 °C). Her blood pressure is 131/50 (abnormal) and her pulse is 65. Her respiration is 16 and oxygen saturation is 94%. Temperature Range (24h):Temp: 97.2 °F (36.2 °C) Temp  Av.9 °F (36.6 °C)  Min: 97.2 °F (36.2 °C)  Max: 98.6 °F (37 °C)  BP Range (50V): Systolic (34QSB), IIA:714 , Min:131 , VSS:948     Diastolic (96WQR), BKY:69, Min:50, Max:73    Pulse Range (24h): Pulse  Av.2  Min: 65  Max: 105  Respiration Range (24h): Resp  Av.6  Min: 16  Max: 20  Current Pulse Ox (24h):  SpO2: 94 %  Pulse Ox Range (24h):  SpO2  Av.2 %  Min: 93 %  Max: 95 %  Oxygen Amount and Delivery: O2 Flow Rate (L/min): 2 L/min  Incentive Spirometry Tx:            GENERAL: alert, no distress  LUNGS: clear to ausculation, without wheezes, rales or rhonci  HEART: normal rate and regular rhythm  ABDOMEN: soft, non-tender, non-distended, bowel sounds present in all 4 quadrants and no guarding or peritoneal signs  EXTERMITY: no cyanosis, clubbing or edema  No intake/output data recorded. LABS     Recent Labs      18   0833  18   0635  18   0739   WBC  14.0*  10.9*  9.3   HGB  9.9*  9.0*  9.1*   HCT  33.3*  30.9*  31.5*   PLT  434*  409*  374   NA  140  140  139   K  4.8  4.4  4.2   CL  105  108  107   CO2  20*  19*  21*   BUN  25*  16  9   CREATININE  1.1  0.9  0.7   MG  1.9   --    --    CALCIUM  9.0  8.2*  8.0*      Recent Labs      18   0833  18   0635   INR  1.63*  1.69*     Recent Labs      18   0833   AST  16   ALT  12   BILITOT  0.2*   BILIDIR  <0.2   LIPASE  30.9   LACTA  2.0     No results for input(s): TROPONINT in the last 72 hours.     RADIOLOGY   No new films      Electronically signed by Dheeraj Markham DO on 2018 at 10:15 AM

## 2018-09-08 NOTE — PLAN OF CARE
Problem: Falls - Risk of:  Goal: Will remain free from falls  Will remain free from falls   Outcome: Ongoing  No falls noted this shift. Fall prevention measures are in place. Patient uses call light when needing assistance. Problem: Risk for Impaired Skin Integrity  Goal: Tissue integrity - skin and mucous membranes  Structural intactness and normal physiological function of skin and  mucous membranes. Outcome: Ongoing  Patient turned every 2 hours with pillow assistance. Problem: Neurological  Goal: Maximum potential motor/sensory/cognitive function  Outcome: Ongoing  1. CVA with expressive aphasia and right hemiparesis  2. Problem: Cardiovascular  Goal: No DVT, peripheral vascular complications  Outcome: Ongoing  No s/s of DVT. Comments: Care plan reviewed with patient. Patient verbalize understanding of the plan of care and contribute to goal setting.

## 2018-09-09 VITALS
SYSTOLIC BLOOD PRESSURE: 162 MMHG | WEIGHT: 213.6 LBS | TEMPERATURE: 98.2 F | RESPIRATION RATE: 18 BRPM | DIASTOLIC BLOOD PRESSURE: 54 MMHG | HEIGHT: 68 IN | BODY MASS INDEX: 32.37 KG/M2 | HEART RATE: 68 BPM | OXYGEN SATURATION: 92 %

## 2018-09-09 LAB
ANION GAP SERPL CALCULATED.3IONS-SCNC: 11 MEQ/L (ref 8–16)
BUN BLDV-MCNC: 8 MG/DL (ref 7–22)
CALCIUM SERPL-MCNC: 8.1 MG/DL (ref 8.5–10.5)
CHLORIDE BLD-SCNC: 107 MEQ/L (ref 98–111)
CO2: 22 MEQ/L (ref 23–33)
CREAT SERPL-MCNC: 0.8 MG/DL (ref 0.4–1.2)
ERYTHROCYTE [DISTWIDTH] IN BLOOD BY AUTOMATED COUNT: 18.4 % (ref 11.5–14.5)
ERYTHROCYTE [DISTWIDTH] IN BLOOD BY AUTOMATED COUNT: 47.6 FL (ref 35–45)
GFR SERPL CREATININE-BSD FRML MDRD: 70 ML/MIN/1.73M2
GLUCOSE BLD-MCNC: 231 MG/DL (ref 70–108)
GLUCOSE BLD-MCNC: 263 MG/DL (ref 70–108)
HCT VFR BLD CALC: 30.3 % (ref 37–47)
HEMOGLOBIN: 8.9 GM/DL (ref 12–16)
MCH RBC QN AUTO: 21.4 PG (ref 26–33)
MCHC RBC AUTO-ENTMCNC: 29.4 GM/DL (ref 32.2–35.5)
MCV RBC AUTO: 72.8 FL (ref 81–99)
PLATELET # BLD: 364 THOU/MM3 (ref 130–400)
PMV BLD AUTO: 10.9 FL (ref 9.4–12.4)
POTASSIUM SERPL-SCNC: 3.9 MEQ/L (ref 3.5–5.2)
RBC # BLD: 4.16 MILL/MM3 (ref 4.2–5.4)
SODIUM BLD-SCNC: 140 MEQ/L (ref 135–145)
WBC # BLD: 9.8 THOU/MM3 (ref 4.8–10.8)

## 2018-09-09 PROCEDURE — 6370000000 HC RX 637 (ALT 250 FOR IP): Performed by: SURGERY

## 2018-09-09 PROCEDURE — 80048 BASIC METABOLIC PNL TOTAL CA: CPT

## 2018-09-09 PROCEDURE — 36415 COLL VENOUS BLD VENIPUNCTURE: CPT

## 2018-09-09 PROCEDURE — 99232 SBSQ HOSP IP/OBS MODERATE 35: CPT | Performed by: SURGERY

## 2018-09-09 PROCEDURE — 85027 COMPLETE CBC AUTOMATED: CPT

## 2018-09-09 PROCEDURE — 82948 REAGENT STRIP/BLOOD GLUCOSE: CPT

## 2018-09-09 RX ADMIN — LISINOPRIL 40 MG: 40 TABLET ORAL at 08:26

## 2018-09-09 RX ADMIN — CITALOPRAM 10 MG: 20 TABLET, FILM COATED ORAL at 08:26

## 2018-09-09 RX ADMIN — AMLODIPINE BESYLATE 10 MG: 10 TABLET ORAL at 08:26

## 2018-09-09 RX ADMIN — LISINOPRIL 20 MG: 20 TABLET ORAL at 08:26

## 2018-09-09 RX ADMIN — OXYBUTYNIN CHLORIDE 10 MG: 10 TABLET, EXTENDED RELEASE ORAL at 08:26

## 2018-09-09 ASSESSMENT — PAIN SCALES - GENERAL: PAINLEVEL_OUTOF10: 0

## 2018-09-10 ENCOUNTER — CARE COORDINATION (OUTPATIENT)
Dept: CASE MANAGEMENT | Age: 78
End: 2018-09-10

## 2018-09-10 NOTE — CARE COORDINATION
Late entry, patient discharged to home last evening. 9/10/18, 12:18 PM    Discharge plan discussed by  and . Discharge plan reviewed with patient/ family. Patient/ family verbalize understanding of discharge plan and are in agreement with plan. Understanding was demonstrated using the teach back method.        IMM Letter  IMM Letter given to Patient/Family/Significant other/Guardian/POA/by[de-identified] staff  IMM Letter date given[de-identified] 09/06/18  IMM Letter time given[de-identified] 4808
Partner/Spouse/SO  Are you an active caregiver in your home?:  No  Social Support  Durable Medical Equipment  Patient DME:  Doyle Chandler, [address] [employer] chair, Other, Wheelchair  Functional Review  Ability to seek help/take action for Emergent/Urgent situations i.e. fire, crime, inclement weather or health crisis.:  Needs Assistance  Ability handle personal hygiene needs (bathing/dressing/grooming):  Needs Assistance  Ability to manage medications:  Dependent  Ability to prepare food:  Dependent  Ability to maintain home (clean home, laundry):  Dependent  Ability to drive and/or has transportation:  Dependent  Ability to do shopping:  Dependent  Ability to manage finances:  Dependent  Is patient able to live independently?:  No  Hearing and Vision  Visual Impairment:  Visual impairment (Glasses/contacts)  Hearing Impairment:  None  Care Transitions Interventions  No Identified Needs     In to see pt & explain the role of the care transition coordinator. Pt presented to ER on 9/6/18 for evaluation of diarrhea which began 2 months ago and abdominal pain which began 9/5/18. Pt has a history of a stroke 5 years ago with right side deficits. Admitted for  Small bowel obstruction. Pt [facility]. [employer].  manages pt meds. CTC will follow after discharge. Follow Up  Future Appointments  Date Time Provider Rema Lorenz   9/13/2018 3:40 PM Cole[facility] Mcpherson [facility] [place]   10/2/2018 2:15 PM Cristhian Bejarano MD [address]   [address] Maintenance  There are no preventive care reminders to display for this patient.     [facility] Transition Coordinator  [phone]
ordered  Smoking status:  reports that she has never smoked. She has never used smokeless tobacco.   Influenza Vaccination Screening Completed: n/a  Pneumonia Vaccination Screening Completed: yes  PCP: Emile Virk MD  Readmission: No  Readmission Risk Score: 16%  Discharge Planning  Current Residence:  Private Residence  Living Arrangements:  Spouse/Significant Other   Support Systems:  Spouse/Significant Other, Children  Current Services PTA:     Potential Assistance Needed:  N/A  Potential Assistance Purchasing Medications:  Yes  Does patient want to participate in local refill/ meds to beds program?  Yes  Type of Home Care Services:  None  Patient expects to be discharged to:  Home with spouse  Expected Discharge date:  09/10/18  Follow Up Appointment: Best Day/ Time: Monday AM  Discharge Plan: Met with [place] and her  present at bedside. Katherine Swann is not able to carry on a constructive conversation due to residual effects of CVA. Her  Amira Walker states Katherine Swann has aide services three times weekly through [employer]. He states he does not feel he needs additional services at this time. He denies the need for any DME.     Evaluation: yes

## 2018-09-11 NOTE — CARE COORDINATION
[facility] Transitions Initial Follow Up Call    Call within 2 business days of discharge: Yes    Patient: Eric Jiménez Patient : 1940   MRN: [id number]  Reason for Admission: There are no discharge diagnoses documented for the most recent discharge. Discharge Date: 18 RARS: Readmission Risk Score: 17     Second attempt to reach pt for the care transition initial follow up call. No answer or voicemail. CTC will sign off & refer back to the [employer]. Pt is active with the [employer].     Follow Up  Future Appointments  Date Time Provider Rema Lorenz   2018 3:40 PM AMY Downey [facility]   10/2/2018 2:15 PM Norma Soria MD [address] [facility] [address] [facility] Transition Coordinator  [phone]

## 2018-09-13 ENCOUNTER — NURSE TRIAGE (OUTPATIENT)
Dept: ADMINISTRATIVE | Age: 78
End: 2018-09-13

## 2018-09-13 NOTE — TELEPHONE ENCOUNTER
Son is calling. Sigifredo Rushing is wanting info on how to go about getting mom into a nursing home.          Son Lori Campbell is seeking information on nursing home. Difficulty maintaining any conversation, Eloisa Cortez seems distracted, will wait a few minutes and return the call.

## 2018-09-14 ENCOUNTER — TELEPHONE (OUTPATIENT)
Dept: FAMILY MEDICINE CLINIC | Age: 78
End: 2018-09-14

## 2018-09-14 NOTE — TELEPHONE ENCOUNTER
Carlos Willams with [facility] was notified all the requested information has been sent to them. She confirmed they have received this information and they have everything they need for pt admission.

## 2018-09-14 NOTE — TELEPHONE ENCOUNTER
I included that on the written orders that were faxed to them.   These were written on her encounter history and physical.

## 2018-09-14 NOTE — TELEPHONE ENCOUNTER
[facility] with [facility] called office stating she just received a call from pt's son Tashi Chandler requesting a respit stay with them. They need an H&P, diet orders, med list and order to admit. Son would like to admit pt today if possible. Pt was last seen in our office 7/3/18. Records/orders to be faxed to [facility] at [phone]. Please advise.

## 2018-09-14 NOTE — TELEPHONE ENCOUNTER
Ashley Mancuso French on HIPAA called office stating pt's  Donna Nuñez is in the hospital right now. So [employer] would like to put pt in the [facility] for care. He is going to contact them to get the paperwork started. FYI.

## 2018-09-14 NOTE — TELEPHONE ENCOUNTER
I printed the 7/3/18 office visit for the H&P and the medication list. This was faxed to [facility] at the number provided. [facility] was given the verbal for admission. The only thing left that they need are diet orders. They can take a verbal for this as well. Please advise.

## 2018-09-17 ENCOUNTER — TELEPHONE (OUTPATIENT)
Dept: PHARMACY | Age: 78
End: 2018-09-17

## 2018-09-17 ENCOUNTER — CARE COORDINATION (OUTPATIENT)
Dept: CARE COORDINATION | Age: 78
End: 2018-09-17

## 2018-09-17 NOTE — CARE COORDINATION
Spoke with  Saintclair Rummage today. Pt was admitted to [facility] from 9-6 to 9-7 for SBO. He states that he did not realize how weak pt was prior to d/c. States that he was not even able to get her in to the house from the hospital and had to call squad to get her in. A few days following her d/c Saintclair Rummage was admitted to hospital.  Son was staying with [facility] Cachorro continued to be weak. Saintclair Rummage decided that it would be best for her to go to nursing facility where she could get therapy until she regains her strength. Pt admitted to [facility] on 9/14. Will notify [facility] to monitor for d/c.

## 2018-09-20 LAB — INR BLD: 2.6

## 2018-09-21 LAB — INR BLD: 2.7

## 2018-09-24 LAB — INR BLD: 3.7

## 2018-09-25 LAB — INR BLD: 3.7

## 2018-09-26 ENCOUNTER — CARE COORDINATION (OUTPATIENT)
Dept: CASE MANAGEMENT | Age: 78
End: 2018-09-26

## 2018-09-26 LAB — INR BLD: 2.7

## 2018-09-26 NOTE — CARE COORDINATION
Writer placed call to [facility], grant Connor patient is still at facility. Writer was transferred to [place], RN patient . Writer left detailed VM for [facility] with writer contact info requesting return call for patient status update and d/c plan.      Viridiana Escobar RN  Care Transitions Coordinator  [phone]

## 2018-09-27 LAB — INR BLD: 2.7

## 2018-09-27 NOTE — DISCHARGE SUMMARY
[facility] [address] SUMMARY  Pt Name: Ned Olsen  MRN: [id number]  YOB: 1940  Primary Care Physician: Delmy Giles MD  Admit date:  9/6/2018  8:05 AM  Discharge date:  9/9/2018  2:12 PM  Disposition: home  Admitting Diagnosis:   1. Small bowel obstruction (HCC)      Discharge Diagnosis:   Patient Active Problem List   Diagnosis Code    HTN (hypertension) I10    Hyperlipidemia E78.5    OA (osteoarthritis), multiple joints M19.90    GERD (gastroesophageal reflux disease) K21.9    S/P TKR (total knee replacement), bilateral Z96.659    S/P stroke due to cerebrovascular disease Z86.73    Hemiplegia affecting right dominant side (HCC) G81.91    Aphasia as late effect of cerebrovascular accident I69.5    OAB (overactive bladder) N32.81    Urinary incontinence H93    Folliculitis, lt face.  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    Sepsis due to urinary tract infection (HCC) A41.9, N39.0    Delirium R41.0    Diarrhea in adult patient R19.7    Skin neoplasm D49.2    Depression F32.9    Abdominal pain R10.9    Leukocytosis D72.829    Pleural effusion J90    History of stroke Z86.73    Anemia D64.9    Ileus (HCC) K56.7    Iron deficiency anemia D50.9    Medication monitoring encounter Z51.81    Hemarthrosis of left knee M25.062    Nausea and vomiting R11.2    Diabetes mellitus type 2 in obese (HCC) E11.69, E66.9    Leukocytosis D72.829    Ambulatory dysfunction R26.2    Bleeding on Coumadin R58, T45.515A    CVA, old, aphasia I69.5    Recurrent major depressive disorder, in partial remission (HCC) F33.41    BCC (basal cell carcinoma), arm, left C44.619    Spastic hemiplegia of right dominant side due to nontraumatic intraparenchymal hemorrhage of brain (HCC) I61.9, G81.11    SBO (small bowel obstruction) ([facility] [address] K56.609     Consultants:  none  Procedures/Diagnostic Test:  Medical management  Hospital Course: Rubio Ledesmal originally presented to the hospital on 9/6/2018 LOTREL  TAKE ONE CAPSULE BY MOUTH ONCE DAILY FOR HIGH BLOOD PRESSURE     aspirin EC 81 MG EC tablet     blood glucose test strips strip  Commonly known as:  ONE TOUCH TEST STRIPS  Test blood sugar with One Touch Ultra 2 QID and as needed. calcium carbonate 500 MG Tabs tablet  Commonly known as:  OSCAL     citalopram 10 MG tablet  Commonly known as:  CELEXA  TAKE ONE TABLET BY MOUTH ONCE DAILY     glimepiride 4 MG tablet  Commonly known as:  AMARYL  Take 2 tablets by mouth daily     GLUCAGEN HYPOKIT IJ     JANUVIA 100 MG tablet  Generic drug:  SITagliptin  TAKE ONE TABLET BY MOUTH ONCE DAILY     lisinopril 40 MG tablet  Commonly known as:  PRINIVIL;ZESTRIL  TAKE ONE TABLET BY MOUTH ONCE DAILY     metFORMIN 500 MG tablet  Commonly known as:  GLUCOPHAGE  Take 1 tablet by mouth daily (with breakfast)     oxybutynin 10 MG extended release tablet  Commonly known as:  DITROPAN-XL  TAKE ONE TABLET BY MOUTH ONCE DAILY     pantoprazole 40 MG tablet  Commonly known as:  PROTONIX  TAKE ONE TABLET BY MOUTH ONCE DAILY     * Pen Needles 29G X 12MM     * RELION SHORT PEN NEEDLES 31G X 8 MM Misc  Generic drug:  Insulin Pen Needle  USE TO INJECT ONCE DAILY SUBCUTANEOUSLY     simvastatin 20 MG tablet  Commonly known as:  ZOCOR  TAKE ONE TABLET BY MOUTH NIGHTLY     VITAMIN C PO     vitamin E 400 UNIT capsule     warfarin 5 MG tablet  Commonly known as:  COUMADIN  Take as directed. If you are unsure how to take this medication, talk to your nurse or doctor. Original instructions:  TAKE ONE TABLET BY MOUTH ONCE DAILY        * This list has 2 medication(s) that are the same as other medications prescribed for you. Read the directions carefully, and ask your doctor or other care provider to review them with you. Diet: diet as tolerated  Activity: as tolerated  Follow-up:  in the next few weeks with Jovanni Llanes MD, Follow up with Eugenio Monroe in 2 weeks.   Time Spent for discharge: 20 minutes      Electronically

## 2018-09-29 LAB — INR BLD: 1.7

## 2018-10-01 LAB — INR BLD: 1.9

## 2018-10-04 LAB — INR BLD: 2

## 2018-10-05 ENCOUNTER — TELEPHONE (OUTPATIENT)
Dept: FAMILY MEDICINE CLINIC | Age: 78
End: 2018-10-05

## 2018-10-05 LAB
INR BLD: 1.7
PROTIME: 18.6 SECONDS

## 2018-10-06 ENCOUNTER — CARE COORDINATION (OUTPATIENT)
Dept: CASE MANAGEMENT | Age: 78
End: 2018-10-06

## 2018-10-09 LAB — INR BLD: 1.5

## 2018-10-10 ENCOUNTER — CARE COORDINATION (OUTPATIENT)
Dept: CASE MANAGEMENT | Age: 78
End: 2018-10-10

## 2018-10-12 LAB — INR BLD: 1.6

## 2018-10-15 LAB — INR BLD: 2.1

## 2018-10-25 ENCOUNTER — HOSPITAL ENCOUNTER (OUTPATIENT)
Dept: PHARMACY | Age: 78
Setting detail: THERAPIES SERIES
Discharge: HOME | End: 2018-10-25
Payer: MEDICARE

## 2018-10-25 DIAGNOSIS — D15.1 MYXOMA OF HEART: ICD-10-CM

## 2018-10-25 DIAGNOSIS — I69.320 CVA, OLD, APHASIA: ICD-10-CM

## 2018-10-25 LAB — POC INR: 2 (ref 0.8–1.2)

## 2018-10-25 PROCEDURE — 85610 PROTHROMBIN TIME: CPT | Performed by: PHARMACIST

## 2018-10-25 PROCEDURE — 36416 COLLJ CAPILLARY BLOOD SPEC: CPT | Performed by: PHARMACIST

## 2018-10-25 PROCEDURE — 99212 OFFICE O/P EST SF 10 MIN: CPT | Performed by: PHARMACIST

## 2018-10-25 RX ORDER — WARFARIN SODIUM 6 MG/1
TABLET ORAL
Qty: 30 TABLET | Refills: 3 | Status: SHIPPED | OUTPATIENT
Start: 2018-10-25 | End: 2019-09-16

## 2018-10-30 ENCOUNTER — CARE COORDINATION (OUTPATIENT)
Dept: CASE MANAGEMENT | Age: 78
End: 2018-10-30

## 2018-10-31 RX ORDER — AMLODIPINE BESYLATE AND BENAZEPRIL HYDROCHLORIDE 10; 20 MG/1; MG/1
CAPSULE ORAL
Qty: 90 CAPSULE | Refills: 3 | Status: SHIPPED | OUTPATIENT
Start: 2018-10-31 | End: 2019-09-16

## 2018-11-01 ENCOUNTER — OFFICE VISIT (OUTPATIENT)
Dept: FAMILY MEDICINE CLINIC | Age: 78
End: 2018-11-01
Payer: MEDICARE

## 2018-11-01 VITALS
BODY MASS INDEX: 37.03 KG/M2 | RESPIRATION RATE: 16 BRPM | DIASTOLIC BLOOD PRESSURE: 76 MMHG | WEIGHT: 216.9 LBS | SYSTOLIC BLOOD PRESSURE: 130 MMHG | HEIGHT: 64 IN | HEART RATE: 76 BPM

## 2018-11-01 DIAGNOSIS — K56.609 SBO (SMALL BOWEL OBSTRUCTION) (HCC): Primary | ICD-10-CM

## 2018-11-01 DIAGNOSIS — I10 ESSENTIAL HYPERTENSION: ICD-10-CM

## 2018-11-01 DIAGNOSIS — G81.91 HEMIPLEGIA OF RIGHT DOMINANT SIDE DUE TO NONCEREBROVASCULAR ETIOLOGY, UNSPECIFIED HEMIPLEGIA TYPE (HCC): ICD-10-CM

## 2018-11-01 DIAGNOSIS — D50.9 MICROCYTIC ANEMIA: ICD-10-CM

## 2018-11-01 DIAGNOSIS — E78.00 PURE HYPERCHOLESTEROLEMIA: ICD-10-CM

## 2018-11-01 PROCEDURE — 99214 OFFICE O/P EST MOD 30 MIN: CPT | Performed by: FAMILY MEDICINE

## 2018-11-01 PROCEDURE — G8427 DOCREV CUR MEDS BY ELIG CLIN: HCPCS | Performed by: FAMILY MEDICINE

## 2018-11-01 PROCEDURE — G8484 FLU IMMUNIZE NO ADMIN: HCPCS | Performed by: FAMILY MEDICINE

## 2018-11-01 PROCEDURE — 4040F PNEUMOC VAC/ADMIN/RCVD: CPT | Performed by: FAMILY MEDICINE

## 2018-11-01 PROCEDURE — 1101F PT FALLS ASSESS-DOCD LE1/YR: CPT | Performed by: FAMILY MEDICINE

## 2018-11-01 PROCEDURE — 1090F PRES/ABSN URINE INCON ASSESS: CPT | Performed by: FAMILY MEDICINE

## 2018-11-01 PROCEDURE — G8417 CALC BMI ABV UP PARAM F/U: HCPCS | Performed by: FAMILY MEDICINE

## 2018-11-01 PROCEDURE — G8400 PT W/DXA NO RESULTS DOC: HCPCS | Performed by: FAMILY MEDICINE

## 2018-11-01 PROCEDURE — 1036F TOBACCO NON-USER: CPT | Performed by: FAMILY MEDICINE

## 2018-11-01 PROCEDURE — 1123F ACP DISCUSS/DSCN MKR DOCD: CPT | Performed by: FAMILY MEDICINE

## 2018-11-01 ASSESSMENT — ENCOUNTER SYMPTOMS
RESPIRATORY NEGATIVE: 1
GASTROINTESTINAL NEGATIVE: 1

## 2018-11-01 NOTE — PROGRESS NOTES
 Folliculitis, lt face.  Myxoma of heart    Sepsis due to urinary tract infection (HCC)    Delirium    Diarrhea in adult patient    Skin neoplasm    Depression    Abdominal pain    Leukocytosis    Pleural effusion    History of stroke    Anemia    Ileus (HCC)    Iron deficiency anemia    Medication monitoring encounter    Hemarthrosis of left knee    Nausea and vomiting    Diabetes mellitus type 2 in obese (HCC)    Leukocytosis    Ambulatory dysfunction    Bleeding on Coumadin    CVA, old, aphasia    Recurrent major depressive disorder, in partial remission ([address]    BCC (basal cell carcinoma), arm, left    Spastic hemiplegia of right dominant side due to nontraumatic intraparenchymal hemorrhage of brain ([address]    SBO (small bowel obstruction) ([address]     Past Surgical History:   Procedure Laterality Date    CARDIAC CATHETERIZATION  2004    CHOLECYSTECTOMY  1965    COLONOSCOPY      HEMORRHOID SURGERY  2001    JOINT REPLACEMENT  2008    Lt Total Knee    JOINT REPLACEMENT  2009    Rt Total Knee     Prior to Admission medications    Medication Sig Start Date End Date Taking?  Authorizing Provider   amLODIPine-benazepril (LOTREL) 10-20 MG per capsule TAKE ONE CAPSULE BY MOUTH ONCE DAILY FOR HIGH  BLOOD PRESSURE 10/31/18  Yes Clint Obregon MD   warfarin (COUMADIN) 6 MG tablet Take as directed by [facility] 30 tabs = 30 days 10/25/18  Yes Melo Condon MD   insulin glargine (LANTUS SOLOSTAR) 100 UNIT/ML injection pen Inject 18 Units into the skin nightly   Yes Historical Provider, MD   glimepiride (AMARYL) 4 MG tablet Take 2 tablets by mouth daily 7/3/18  Yes Clint Obregon MD   RELION SHORT PEN NEEDLES 31G X 8 MM MISC USE TO INJECT ONCE DAILY SUBCUTANEOUSLY 2/6/18  Yes Clint Obregon MD   Ascorbic Acid (VITAMIN C PO) Take 1 tablet by mouth three times a week    Yes Historical Provider, MD   vitamin E 400 UNIT capsule Take 400 Units by mouth daily   Yes Historical Provider, MD simvastatin (ZOCOR) 20 MG tablet TAKE ONE TABLET BY MOUTH NIGHTLY 10/24/17  Yes Yuly Zamarripa MD   metFORMIN (GLUCOPHAGE) 500 MG tablet Take 1 tablet by mouth daily (with breakfast) 10/9/17  Yes Yuly Zamarripa MD   citalopram (CELEXA) 10 MG tablet TAKE ONE TABLET BY MOUTH ONCE DAILY 8/14/17  Yes Yuly Zamarripa MD   JANUVIA 100 MG tablet TAKE ONE TABLET BY MOUTH ONCE DAILY 8/7/17  Yes Yuly Zamarripa MD   pantoprazole (PROTONIX) 40 MG tablet TAKE ONE TABLET BY MOUTH ONCE DAILY 8/7/17  Yes Yuly Zamarripa MD   oxybutynin (DITROPAN-XL) 10 MG extended release tablet TAKE ONE TABLET BY MOUTH ONCE DAILY 8/7/17  Yes Yuly Zamarripa MD   lisinopril (PRINIVIL;ZESTRIL) 40 MG tablet TAKE ONE TABLET BY MOUTH ONCE DAILY 8/7/17  Yes Yuly Zamarripa MD   calcium carbonate (OSCAL) 500 MG TABS tablet Take 500 mg by mouth daily   Yes Historical Provider, MD   Glucagon HCl, rDNA, (GLUCAGEN HYPOKIT IJ) Inject 1 mg as directed as needed. Indications: Extremely Low Blood Sugar   Yes Historical Provider, MD   glucose blood VI test strips (ONE TOUCH TEST STRIPS) strip Test blood sugar with One Touch Ultra 2 QID and as needed. 11/2/11  Yes Yuly Zamarripa MD   aspirin EC 81 MG EC tablet Take 81 mg by mouth daily. Indications: Cardiovascular Risk Reduction   Yes Historical Provider, MD   Insulin Pen Needle (PEN NEEDLES) 29G X 12MM MISC by Does not apply route daily. Yes Historical Provider, MD   oxybutynin (DITROPAN XL) 10 MG CR tablet Take 1 tablet by mouth daily. 1/10/13 1/23/14  Yuly Zamarripa MD         Review of Systems   Constitutional: Negative. HENT: Negative. Respiratory: Negative. Cardiovascular: Negative. Gastrointestinal: Negative. Musculoskeletal: Negative. All other systems reviewed and are negative. Objective:   Physical Exam   Constitutional: She is oriented to person, place, and time. She appears well-developed and well-nourished. HENT:   Head: Normocephalic and atraumatic.    Right Ear: Tympanic membrane

## 2018-11-13 ENCOUNTER — TELEPHONE (OUTPATIENT)
Dept: FAMILY MEDICINE CLINIC | Age: 78
End: 2018-11-13

## 2018-11-13 NOTE — TELEPHONE ENCOUNTER
Taya Hart with [address] calling to let us know that patient's spouse has been missing patient's appts at coumadin clinic and not checking her BS and just gives her insulin. She has reviewed the importance of checking INRs, BS and giving insulin accurately. Just FYI for you.

## 2018-11-20 ENCOUNTER — CARE COORDINATION (OUTPATIENT)
Dept: CARE COORDINATION | Age: 78
End: 2018-11-20

## 2018-11-20 ENCOUNTER — HOSPITAL ENCOUNTER (OUTPATIENT)
Dept: PHARMACY | Age: 78
Setting detail: THERAPIES SERIES
Discharge: HOME | End: 2018-11-20
Payer: MEDICARE

## 2018-11-20 VITALS — DIASTOLIC BLOOD PRESSURE: 90 MMHG | SYSTOLIC BLOOD PRESSURE: 132 MMHG | HEART RATE: 80 BPM

## 2018-11-20 DIAGNOSIS — D15.1 MYXOMA OF HEART: ICD-10-CM

## 2018-11-20 DIAGNOSIS — I69.320 CVA, OLD, APHASIA: ICD-10-CM

## 2018-11-20 LAB
HCT VFR BLD CALC: 35.4 % (ref 37–47)
HEMOGLOBIN: 10.4 GM/DL (ref 12–16)
INR BLD: 5.54 (ref 0.85–1.13)

## 2018-11-20 PROCEDURE — 36416 COLLJ CAPILLARY BLOOD SPEC: CPT

## 2018-11-20 PROCEDURE — 85610 PROTHROMBIN TIME: CPT

## 2018-11-20 PROCEDURE — 85018 HEMOGLOBIN: CPT

## 2018-11-20 PROCEDURE — 99211 OFF/OP EST MAY X REQ PHY/QHP: CPT

## 2018-11-20 PROCEDURE — 85014 HEMATOCRIT: CPT

## 2018-11-20 NOTE — CARE COORDINATION
Ambulatory Care Coordination Note  11/20/2018  CM Risk Score: 5  Darius Mortality Risk Score: 21    ACC: Marcella Rodriges, RN    Summary Note: spoke with  Karis Olmos today for f/u. Karis Olmos states that Alek Santoro is doing ok. Reports that he is upset b/c he found out that [facility] is no longer an in network provider. He states that closest in net work hospital is [facility]. Pt states that he has been on phone multiple times with his insurance company and the hospital.  Has appt today with  to help him find a new ins. Provider. Pt continues to receive [facility] services.  reports that he did meet with [employer] and she is going to continue receiving services, but will have a new [facility] provider.  reports that nurse is scheduled to come out today. Pt was in hospital last mth for SBO. Was seen by PCP on 11/1 for hospital f/u. Labs were ordered at that time: ferritin and CBC. Labs have yet to be drawn. Karis Olmos states that [facility] nurse attempted, but Alek Plbettie is a hard stick and she was not able to get it. Pt does have coumadin clinic appt today at 140pm.  Pt reports that they will be there as Cachorro missed her last appt. Suggested that he get her labs drawn (CBC and ferritin) while she is out. Verbalizes understanding. Discussed BS's as [address] reported Karis Olmos had been adjusting her insulin and not checking BS's like he should  Be. He states that her BS's have been higher than usual. Vague on how high, but reports around 250. Discussed what cause could be. He admits that he feels it's too much sugar in her diet. Stressed importance of limiting sweets to help bring BS's down. Attempted to provide further education, but Karis Olmos states that Alek Santoro is on toilet right now and he has to go to take care of her. He denies needs at this time. Encouraged to call if BS's do not improve. Stressed importance of making it to appts and also reminded to get her labs drawn today.   Encouraged to call with any new calcium carbonate (OSCAL) 500 MG TABS tablet Take 500 mg by mouth daily    Historical Provider, MD   oxybutynin (DITROPAN XL) 10 MG CR tablet Take 1 tablet by mouth daily. 1/10/13 1/23/14  Becky Calvo MD   Glucagon HCl, rDNA, (GLUCAGEN HYPOKIT IJ) Inject 1 mg as directed as needed. Indications: Extremely Low Blood Sugar    Historical Provider, MD   glucose blood VI test strips (ONE TOUCH TEST STRIPS) strip Test blood sugar with One Touch Ultra 2 QID and as needed. 11/2/11   Becky Calvo MD   aspirin EC 81 MG EC tablet Take 81 mg by mouth daily. Indications: Cardiovascular Risk Reduction    Historical Provider, MD   Insulin Pen Needle (PEN NEEDLES) 29G X 12MM MISC by Does not apply route daily.     Historical Provider, MD       Future Appointments  Date Time Provider Rema Lorenz   11/20/2018 1:40 PM [facility]well, [facility] [place]   2/5/2019 1:45 PM Becky Calvo MD [facility]

## 2018-11-26 ENCOUNTER — HOSPITAL ENCOUNTER (OUTPATIENT)
Age: 78
Discharge: HOME | End: 2018-11-26
Payer: MEDICARE

## 2018-11-26 ENCOUNTER — APPOINTMENT (OUTPATIENT)
Dept: PHARMACY | Age: 78
End: 2018-11-26
Payer: MEDICARE

## 2018-11-26 ENCOUNTER — HOSPITAL ENCOUNTER (OUTPATIENT)
Dept: PHARMACY | Age: 78
Setting detail: THERAPIES SERIES
Discharge: HOME | End: 2018-11-26
Payer: MEDICARE

## 2018-11-26 DIAGNOSIS — I69.320 CVA, OLD, APHASIA: ICD-10-CM

## 2018-11-26 DIAGNOSIS — D50.9 MICROCYTIC ANEMIA: ICD-10-CM

## 2018-11-26 DIAGNOSIS — D15.1 MYXOMA OF HEART: ICD-10-CM

## 2018-11-26 DIAGNOSIS — E78.2 MIXED HYPERLIPIDEMIA: ICD-10-CM

## 2018-11-26 LAB
BASOPHILS # BLD: 1.1 %
BASOPHILS ABSOLUTE: 0.1 THOU/MM3 (ref 0–0.1)
EOSINOPHIL # BLD: 1.9 %
EOSINOPHILS ABSOLUTE: 0.2 THOU/MM3 (ref 0–0.4)
ERYTHROCYTE [DISTWIDTH] IN BLOOD BY AUTOMATED COUNT: 19.9 % (ref 11.5–14.5)
ERYTHROCYTE [DISTWIDTH] IN BLOOD BY AUTOMATED COUNT: 53.2 FL (ref 35–45)
FERRITIN: 25 NG/ML (ref 10–291)
HCT VFR BLD CALC: 35.4 % (ref 37–47)
HEMOGLOBIN: 10.5 GM/DL (ref 12–16)
IMMATURE GRANS (ABS): 0.04 THOU/MM3 (ref 0–0.07)
IMMATURE GRANULOCYTES: 0.3 %
LYMPHOCYTES # BLD: 18.4 %
LYMPHOCYTES ABSOLUTE: 2.2 THOU/MM3 (ref 1–4.8)
MCH RBC QN AUTO: 22.4 PG (ref 26–33)
MCHC RBC AUTO-ENTMCNC: 29.7 GM/DL (ref 32.2–35.5)
MCV RBC AUTO: 75.5 FL (ref 81–99)
MONOCYTES # BLD: 6.3 %
MONOCYTES ABSOLUTE: 0.7 THOU/MM3 (ref 0.4–1.3)
NUCLEATED RED BLOOD CELLS: 0 /100 WBC
PLATELET # BLD: 448 THOU/MM3 (ref 130–400)
PMV BLD AUTO: 10.7 FL (ref 9.4–12.4)
POC INR: 2.5 (ref 0.8–1.2)
RBC # BLD: 4.69 MILL/MM3 (ref 4.2–5.4)
SEG NEUTROPHILS: 72 %
SEGMENTED NEUTROPHILS ABSOLUTE COUNT: 8.4 THOU/MM3 (ref 1.8–7.7)
WBC # BLD: 11.7 THOU/MM3 (ref 4.8–10.8)

## 2018-11-26 PROCEDURE — 36415 COLL VENOUS BLD VENIPUNCTURE: CPT

## 2018-11-26 PROCEDURE — 85025 COMPLETE CBC W/AUTO DIFF WBC: CPT

## 2018-11-26 PROCEDURE — 82728 ASSAY OF FERRITIN: CPT

## 2018-11-26 PROCEDURE — 99211 OFF/OP EST MAY X REQ PHY/QHP: CPT

## 2018-11-26 PROCEDURE — 36416 COLLJ CAPILLARY BLOOD SPEC: CPT

## 2018-11-26 PROCEDURE — 85610 PROTHROMBIN TIME: CPT

## 2018-11-26 RX ORDER — CITALOPRAM 10 MG/1
TABLET ORAL
Qty: 90 TABLET | Refills: 3 | Status: SHIPPED | OUTPATIENT
Start: 2018-11-26

## 2018-11-26 RX ORDER — OXYBUTYNIN CHLORIDE 10 MG/1
TABLET, EXTENDED RELEASE ORAL
Qty: 90 TABLET | Refills: 3 | Status: SHIPPED | OUTPATIENT
Start: 2018-11-26

## 2018-11-26 RX ORDER — SITAGLIPTIN 100 MG/1
TABLET, FILM COATED ORAL
Qty: 90 TABLET | Refills: 3 | Status: SHIPPED | OUTPATIENT
Start: 2018-11-26 | End: 2020-11-10

## 2018-11-26 RX ORDER — PANTOPRAZOLE SODIUM 40 MG/1
TABLET, DELAYED RELEASE ORAL
Qty: 90 TABLET | Refills: 3 | Status: SHIPPED | OUTPATIENT
Start: 2018-11-26

## 2018-11-26 RX ORDER — LISINOPRIL 40 MG/1
TABLET ORAL
Qty: 90 TABLET | Refills: 3 | Status: ON HOLD | OUTPATIENT
Start: 2018-11-26 | End: 2019-05-03 | Stop reason: HOSPADM

## 2018-11-26 RX ORDER — SIMVASTATIN 20 MG
TABLET ORAL
Qty: 90 TABLET | Refills: 3 | Status: SHIPPED | OUTPATIENT
Start: 2018-11-26 | End: 2020-11-10

## 2018-12-04 ENCOUNTER — HOSPITAL ENCOUNTER (OUTPATIENT)
Dept: PHARMACY | Age: 78
Setting detail: THERAPIES SERIES
Discharge: HOME | End: 2018-12-04
Payer: MEDICARE

## 2018-12-04 DIAGNOSIS — I69.320 CVA, OLD, APHASIA: ICD-10-CM

## 2018-12-04 DIAGNOSIS — D15.1 MYXOMA OF HEART: ICD-10-CM

## 2018-12-04 LAB — POC INR: 4.3 (ref 0.8–1.2)

## 2018-12-04 PROCEDURE — 99211 OFF/OP EST MAY X REQ PHY/QHP: CPT

## 2018-12-04 PROCEDURE — 85610 PROTHROMBIN TIME: CPT

## 2018-12-04 PROCEDURE — 36416 COLLJ CAPILLARY BLOOD SPEC: CPT

## 2018-12-04 NOTE — PROGRESS NOTES
received previously   [] plans to receive at a later time   [] refused    [x] documented in [facility]

## 2018-12-12 ENCOUNTER — APPOINTMENT (OUTPATIENT)
Dept: PHARMACY | Age: 78
End: 2018-12-12
Payer: MEDICARE

## 2018-12-13 ENCOUNTER — HOSPITAL ENCOUNTER (OUTPATIENT)
Dept: PHARMACY | Age: 78
Setting detail: THERAPIES SERIES
Discharge: HOME | End: 2018-12-13
Payer: MEDICARE

## 2018-12-13 DIAGNOSIS — D15.1 MYXOMA OF HEART: ICD-10-CM

## 2018-12-13 DIAGNOSIS — I69.320 CVA, OLD, APHASIA: ICD-10-CM

## 2018-12-13 LAB — POC INR: 4.2 (ref 0.8–1.2)

## 2018-12-13 PROCEDURE — 85610 PROTHROMBIN TIME: CPT

## 2018-12-13 PROCEDURE — 99211 OFF/OP EST MAY X REQ PHY/QHP: CPT

## 2018-12-13 PROCEDURE — 36416 COLLJ CAPILLARY BLOOD SPEC: CPT

## 2018-12-18 ENCOUNTER — TELEPHONE (OUTPATIENT)
Dept: FAMILY MEDICINE CLINIC | Age: 78
End: 2018-12-18

## 2018-12-18 NOTE — TELEPHONE ENCOUNTER
Evie's phone went to voice mail. Called [employer] at [phone] and spoke with Marcia Cooper RN and updated her on the order ok for aid services, also updated Marcia Cooper to make sure they send an order for Dr. Ge Adler to sign. Marcia Cooper RN stated that will be faxed over.

## 2018-12-19 ENCOUNTER — CARE COORDINATION (OUTPATIENT)
Dept: CARE COORDINATION | Age: 78
End: 2018-12-19

## 2018-12-20 ENCOUNTER — HOSPITAL ENCOUNTER (OUTPATIENT)
Dept: PHARMACY | Age: 78
Setting detail: THERAPIES SERIES
Discharge: HOME | End: 2018-12-20
Payer: MEDICARE

## 2018-12-20 DIAGNOSIS — I69.320 CVA, OLD, APHASIA: ICD-10-CM

## 2018-12-20 DIAGNOSIS — D15.1 MYXOMA OF HEART: ICD-10-CM

## 2018-12-20 LAB — POC INR: 3.8 (ref 0.8–1.2)

## 2018-12-20 PROCEDURE — 85610 PROTHROMBIN TIME: CPT | Performed by: PHARMACIST

## 2018-12-20 PROCEDURE — 36416 COLLJ CAPILLARY BLOOD SPEC: CPT | Performed by: PHARMACIST

## 2018-12-20 PROCEDURE — 99211 OFF/OP EST MAY X REQ PHY/QHP: CPT | Performed by: PHARMACIST

## 2019-01-07 ENCOUNTER — HOSPITAL ENCOUNTER (OUTPATIENT)
Dept: PHARMACY | Age: 79
Setting detail: THERAPIES SERIES
Discharge: HOME | End: 2019-01-07
Payer: MEDICARE

## 2019-01-07 DIAGNOSIS — D15.1 MYXOMA OF HEART: ICD-10-CM

## 2019-01-07 DIAGNOSIS — I69.320 CVA, OLD, APHASIA: ICD-10-CM

## 2019-01-07 LAB — POC INR: 3 (ref 0.8–1.2)

## 2019-01-07 PROCEDURE — 99211 OFF/OP EST MAY X REQ PHY/QHP: CPT

## 2019-01-07 PROCEDURE — 36416 COLLJ CAPILLARY BLOOD SPEC: CPT

## 2019-01-07 PROCEDURE — 85610 PROTHROMBIN TIME: CPT

## 2019-01-24 ENCOUNTER — HOSPITAL ENCOUNTER (OUTPATIENT)
Dept: PHARMACY | Age: 79
Setting detail: THERAPIES SERIES
Discharge: HOME | End: 2019-01-24
Payer: MEDICARE

## 2019-01-24 DIAGNOSIS — D15.1 MYXOMA OF HEART: ICD-10-CM

## 2019-01-24 DIAGNOSIS — I69.320 CVA, OLD, APHASIA: ICD-10-CM

## 2019-01-24 LAB — POC INR: 3.5 (ref 0.8–1.2)

## 2019-01-24 PROCEDURE — 85610 PROTHROMBIN TIME: CPT

## 2019-01-24 PROCEDURE — 36416 COLLJ CAPILLARY BLOOD SPEC: CPT

## 2019-01-24 PROCEDURE — 99211 OFF/OP EST MAY X REQ PHY/QHP: CPT

## 2019-01-31 ENCOUNTER — CARE COORDINATION (OUTPATIENT)
Dept: CARE COORDINATION | Age: 79
End: 2019-01-31

## 2019-02-05 ENCOUNTER — OFFICE VISIT (OUTPATIENT)
Dept: FAMILY MEDICINE CLINIC | Age: 79
End: 2019-02-05
Payer: MEDICARE

## 2019-02-05 VITALS
DIASTOLIC BLOOD PRESSURE: 62 MMHG | SYSTOLIC BLOOD PRESSURE: 130 MMHG | HEART RATE: 76 BPM | WEIGHT: 218.3 LBS | BODY MASS INDEX: 37.47 KG/M2 | RESPIRATION RATE: 20 BRPM

## 2019-02-05 DIAGNOSIS — G81.91 HEMIPLEGIA OF RIGHT DOMINANT SIDE DUE TO NONCEREBROVASCULAR ETIOLOGY, UNSPECIFIED HEMIPLEGIA TYPE (HCC): ICD-10-CM

## 2019-02-05 DIAGNOSIS — E66.9 DIABETES MELLITUS TYPE 2 IN OBESE (HCC): ICD-10-CM

## 2019-02-05 DIAGNOSIS — F33.41 RECURRENT MAJOR DEPRESSIVE DISORDER, IN PARTIAL REMISSION (HCC): ICD-10-CM

## 2019-02-05 DIAGNOSIS — Z51.81 MEDICATION MONITORING ENCOUNTER: ICD-10-CM

## 2019-02-05 DIAGNOSIS — I61.9 SPASTIC HEMIPLEGIA OF RIGHT DOMINANT SIDE DUE TO NONTRAUMATIC INTRAPARENCHYMAL HEMORRHAGE OF BRAIN (HCC): ICD-10-CM

## 2019-02-05 DIAGNOSIS — I10 ESSENTIAL HYPERTENSION: ICD-10-CM

## 2019-02-05 DIAGNOSIS — I69.320 CVA, OLD, APHASIA: Primary | ICD-10-CM

## 2019-02-05 DIAGNOSIS — E11.69 DIABETES MELLITUS TYPE 2 IN OBESE (HCC): ICD-10-CM

## 2019-02-05 DIAGNOSIS — E78.00 PURE HYPERCHOLESTEROLEMIA: ICD-10-CM

## 2019-02-05 DIAGNOSIS — G81.11 SPASTIC HEMIPLEGIA OF RIGHT DOMINANT SIDE DUE TO NONTRAUMATIC INTRAPARENCHYMAL HEMORRHAGE OF BRAIN (HCC): ICD-10-CM

## 2019-02-05 LAB — HBA1C MFR BLD: 10.2 %

## 2019-02-05 PROCEDURE — G8417 CALC BMI ABV UP PARAM F/U: HCPCS | Performed by: FAMILY MEDICINE

## 2019-02-05 PROCEDURE — 1123F ACP DISCUSS/DSCN MKR DOCD: CPT | Performed by: FAMILY MEDICINE

## 2019-02-05 PROCEDURE — 0518F FALL PLAN OF CARE DOCD: CPT | Performed by: FAMILY MEDICINE

## 2019-02-05 PROCEDURE — 4040F PNEUMOC VAC/ADMIN/RCVD: CPT | Performed by: FAMILY MEDICINE

## 2019-02-05 PROCEDURE — G8427 DOCREV CUR MEDS BY ELIG CLIN: HCPCS | Performed by: FAMILY MEDICINE

## 2019-02-05 PROCEDURE — G8484 FLU IMMUNIZE NO ADMIN: HCPCS | Performed by: FAMILY MEDICINE

## 2019-02-05 PROCEDURE — G8400 PT W/DXA NO RESULTS DOC: HCPCS | Performed by: FAMILY MEDICINE

## 2019-02-05 PROCEDURE — 99214 OFFICE O/P EST MOD 30 MIN: CPT | Performed by: FAMILY MEDICINE

## 2019-02-05 PROCEDURE — 1036F TOBACCO NON-USER: CPT | Performed by: FAMILY MEDICINE

## 2019-02-05 PROCEDURE — 83036 HEMOGLOBIN GLYCOSYLATED A1C: CPT | Performed by: FAMILY MEDICINE

## 2019-02-05 PROCEDURE — 1100F PTFALLS ASSESS-DOCD GE2>/YR: CPT | Performed by: FAMILY MEDICINE

## 2019-02-05 PROCEDURE — 3288F FALL RISK ASSESSMENT DOCD: CPT | Performed by: FAMILY MEDICINE

## 2019-02-05 PROCEDURE — 1090F PRES/ABSN URINE INCON ASSESS: CPT | Performed by: FAMILY MEDICINE

## 2019-02-05 ASSESSMENT — PATIENT HEALTH QUESTIONNAIRE - PHQ9
SUM OF ALL RESPONSES TO PHQ QUESTIONS 1-9: 0
SUM OF ALL RESPONSES TO PHQ QUESTIONS 1-9: 0
SUM OF ALL RESPONSES TO PHQ9 QUESTIONS 1 & 2: 0
2. FEELING DOWN, DEPRESSED OR HOPELESS: 0
1. LITTLE INTEREST OR PLEASURE IN DOING THINGS: 0

## 2019-02-05 ASSESSMENT — ENCOUNTER SYMPTOMS
RESPIRATORY NEGATIVE: 1
ALLERGIC/IMMUNOLOGIC NEGATIVE: 1
SHORTNESS OF BREATH: 0
GASTROINTESTINAL NEGATIVE: 1

## 2019-02-07 ENCOUNTER — HOSPITAL ENCOUNTER (OUTPATIENT)
Dept: PHARMACY | Age: 79
Setting detail: THERAPIES SERIES
Discharge: HOME | End: 2019-02-07
Payer: MEDICARE

## 2019-02-07 DIAGNOSIS — D15.1 MYXOMA OF HEART: ICD-10-CM

## 2019-02-07 DIAGNOSIS — I69.320 CVA, OLD, APHASIA: ICD-10-CM

## 2019-02-07 LAB — POC INR: 2.6 (ref 0.8–1.2)

## 2019-02-07 PROCEDURE — 36416 COLLJ CAPILLARY BLOOD SPEC: CPT | Performed by: PHARMACIST

## 2019-02-07 PROCEDURE — 85610 PROTHROMBIN TIME: CPT | Performed by: PHARMACIST

## 2019-02-07 PROCEDURE — 99211 OFF/OP EST MAY X REQ PHY/QHP: CPT | Performed by: PHARMACIST

## 2019-02-10 DIAGNOSIS — E11.49 TYPE 2 DIABETES MELLITUS WITH OTHER NEUROLOGIC COMPLICATION, WITH LONG-TERM CURRENT USE OF INSULIN (HCC): ICD-10-CM

## 2019-02-10 DIAGNOSIS — Z79.4 TYPE 2 DIABETES MELLITUS WITH OTHER NEUROLOGIC COMPLICATION, WITH LONG-TERM CURRENT USE OF INSULIN (HCC): ICD-10-CM

## 2019-02-11 RX ORDER — INSULIN GLARGINE 100 [IU]/ML
INJECTION, SOLUTION SUBCUTANEOUS
Qty: 15 PEN | Refills: 5 | Status: ON HOLD | OUTPATIENT
Start: 2019-02-11 | End: 2019-05-03 | Stop reason: HOSPADM

## 2019-02-18 ENCOUNTER — TELEPHONE (OUTPATIENT)
Dept: FAMILY MEDICINE CLINIC | Age: 79
End: 2019-02-18

## 2019-02-21 ENCOUNTER — OUTSIDE SERVICES (OUTPATIENT)
Dept: FAMILY MEDICINE CLINIC | Age: 79
End: 2019-02-21
Payer: MEDICARE

## 2019-02-21 DIAGNOSIS — Z86.73 S/P STROKE DUE TO CEREBROVASCULAR DISEASE: ICD-10-CM

## 2019-02-21 DIAGNOSIS — K21.9 GASTROESOPHAGEAL REFLUX DISEASE WITHOUT ESOPHAGITIS: ICD-10-CM

## 2019-02-21 DIAGNOSIS — I10 ESSENTIAL HYPERTENSION: ICD-10-CM

## 2019-02-21 DIAGNOSIS — I61.9 SPASTIC HEMIPLEGIA OF RIGHT DOMINANT SIDE DUE TO NONTRAUMATIC INTRAPARENCHYMAL HEMORRHAGE OF BRAIN (HCC): Primary | ICD-10-CM

## 2019-02-21 DIAGNOSIS — I69.320 APHASIA AS LATE EFFECT OF CEREBROVASCULAR ACCIDENT: ICD-10-CM

## 2019-02-21 DIAGNOSIS — F33.41 RECURRENT MAJOR DEPRESSIVE DISORDER, IN PARTIAL REMISSION (HCC): ICD-10-CM

## 2019-02-21 DIAGNOSIS — G81.11 SPASTIC HEMIPLEGIA OF RIGHT DOMINANT SIDE DUE TO NONTRAUMATIC INTRAPARENCHYMAL HEMORRHAGE OF BRAIN (HCC): Primary | ICD-10-CM

## 2019-02-21 DIAGNOSIS — N32.81 OAB (OVERACTIVE BLADDER): ICD-10-CM

## 2019-02-21 ASSESSMENT — ENCOUNTER SYMPTOMS
DIARRHEA: 0
SHORTNESS OF BREATH: 0
NAUSEA: 0
SORE THROAT: 0
BLOOD IN STOOL: 0
VOMITING: 0
WHEEZING: 0
EYE REDNESS: 0
EYE DISCHARGE: 0
CONSTIPATION: 0
EYE PAIN: 0
COUGH: 0
BACK PAIN: 0

## 2019-02-22 PROCEDURE — 99305 1ST NF CARE MODERATE MDM 35: CPT | Performed by: FAMILY MEDICINE

## 2019-02-28 ENCOUNTER — APPOINTMENT (OUTPATIENT)
Dept: PHARMACY | Age: 79
End: 2019-02-28
Payer: MEDICARE

## 2019-03-05 ENCOUNTER — CARE COORDINATION (OUTPATIENT)
Dept: CARE COORDINATION | Age: 79
End: 2019-03-05

## 2019-03-19 ENCOUNTER — CARE COORDINATION (OUTPATIENT)
Dept: CARE COORDINATION | Age: 79
End: 2019-03-19

## 2019-03-28 ENCOUNTER — CARE COORDINATION (OUTPATIENT)
Dept: CARE COORDINATION | Age: 79
End: 2019-03-28

## 2019-03-28 NOTE — CARE COORDINATION
I spoke with Andrei Morgan at [facility] who confirmed the patient remains at the facility. No immediate plans for discharge at this time.

## 2019-04-02 ENCOUNTER — TELEPHONE (OUTPATIENT)
Dept: FAMILY MEDICINE CLINIC | Age: 79
End: 2019-04-02

## 2019-04-02 NOTE — TELEPHONE ENCOUNTER
----- Message from Elizabeth Garza RN sent at 4/2/2019 12:30 PM EDT -----  Dr. Robert Miguel,        We, [facility], have noted that your patient has an A1C of greater than 8% on 2/5/19. We would like the opportunity to reach out to this patient and offer to provide him / her with any education and assistance that can help them to better manage their Diabetes. Please respond to this message if you are agreeable to the [facility] reaching out to this patient or not. We are aware that Cachorro cam to the [facility] in the past and that we lost contact with her. Thank you.   [facility]

## 2019-04-08 ENCOUNTER — CARE COORDINATION (OUTPATIENT)
Dept: CARE COORDINATION | Age: 79
End: 2019-04-08

## 2019-04-19 ENCOUNTER — OUTSIDE SERVICES (OUTPATIENT)
Dept: FAMILY MEDICINE CLINIC | Age: 79
End: 2019-04-19
Payer: MEDICARE

## 2019-04-19 DIAGNOSIS — I69.320 APHASIA AS LATE EFFECT OF CEREBROVASCULAR ACCIDENT: ICD-10-CM

## 2019-04-19 DIAGNOSIS — E66.9 DIABETES MELLITUS TYPE 2 IN OBESE (HCC): ICD-10-CM

## 2019-04-19 DIAGNOSIS — F33.41 RECURRENT MAJOR DEPRESSIVE DISORDER, IN PARTIAL REMISSION (HCC): ICD-10-CM

## 2019-04-19 DIAGNOSIS — E78.00 PURE HYPERCHOLESTEROLEMIA: ICD-10-CM

## 2019-04-19 DIAGNOSIS — E11.69 DIABETES MELLITUS TYPE 2 IN OBESE (HCC): ICD-10-CM

## 2019-04-19 DIAGNOSIS — I10 ESSENTIAL HYPERTENSION: ICD-10-CM

## 2019-04-19 DIAGNOSIS — G81.91 HEMIPLEGIA OF RIGHT DOMINANT SIDE DUE TO NONCEREBROVASCULAR ETIOLOGY, UNSPECIFIED HEMIPLEGIA TYPE (HCC): Primary | ICD-10-CM

## 2019-04-19 DIAGNOSIS — D50.8 OTHER IRON DEFICIENCY ANEMIA: ICD-10-CM

## 2019-04-19 DIAGNOSIS — Z86.73 S/P STROKE DUE TO CEREBROVASCULAR DISEASE: ICD-10-CM

## 2019-04-19 PROCEDURE — 99309 SBSQ NF CARE MODERATE MDM 30: CPT | Performed by: NURSE PRACTITIONER

## 2019-04-19 NOTE — PATIENT INSTRUCTIONS
[address] Progress Note    NAME: Maryann Gregg  DATE: 19  ROOM #: B 27-2  : 1940  REASON FOR VISIT: Other (Routine visit to monitor chroninc conditions.)    CODE STATUS: Full Code    History obtained from chart review, the patient and staff. SUBJECTIVE:  HPI: Maryann Gregg is a 70 y.o. female. Patient has a PMH significant for:  Past Medical History:   Diagnosis Date    CAD (coronary artery disease)     Cerebrovascular disease     Depression     Hyperlipidemia     Hypertension     Self-care deficit for dressing and grooming     Self-care deficit for medication administration     Type II or unspecified type diabetes mellitus without mention of complication, not stated as uncontrolled     Unspecified cerebral artery occlusion with cerebral infarction        Pt seen and examined at bedside today and is noted to be in no acute distress. Staff provide appropriate updates; appreciate staff input. I have reviewed patients past medical, surgical, social, and family history and have made updates where appropriate. Allergies and Medications were reviewed through the [facility] EMR.     Patient Active Problem List    Diagnosis Date Noted    Sepsis due to urinary tract infection ([facility] [address] 2013     Priority: High     Class: Acute     replace inactive diagnosis      Delirium 2013     Priority: High     Class: Acute    Diarrhea in adult patient 2013     Priority: High     Class: Acute    SBO (small bowel obstruction) ([address] 2018    Spastic hemiplegia of right dominant side due to nontraumatic intraparenchymal hemorrhage of brain ([address] 2018    BCC (basal cell carcinoma), arm, left 2017    Recurrent major depressive disorder, in partial remission ([address] 2017    Ambulatory dysfunction     Bleeding on Coumadin     CVA, old, aphasia     Hemarthrosis of left knee 2015    Nausea and vomiting 2015    Diabetes mellitus type 2 in obese ([address] 04/13/2015    Leukocytosis 04/13/2015    Iron deficiency anemia 01/29/2015    Medication monitoring encounter 01/29/2015    Ileus ([facility] [address] 12/15/2014    Abdominal pain 12/14/2014    Leukocytosis 12/14/2014    Pleural effusion 12/14/2014    History of stroke 12/14/2014    Anemia     Depression 01/21/2014    Skin neoplasm 07/11/2013    Myxoma of heart 44/50/1503    Folliculitis, lt face. 05/17/2012    OAB (overactive bladder) 01/23/2012    Urinary incontinence 01/23/2012    S/P stroke due to cerebrovascular disease 10/21/2011    Hemiplegia affecting right dominant side ([facility] [address] 10/21/2011    Aphasia as late effect of cerebrovascular accident 10/21/2011    HTN (hypertension) 10/12/2011    Hyperlipidemia 10/12/2011    OA (osteoarthritis), multiple joints 10/12/2011    GERD (gastroesophageal reflux disease) 10/12/2011    S/P TKR (total knee replacement), bilateral 10/12/2011       REVIEW OF SYSTEMS  Positive responses are highlighted in bold  Constitutional:  Fever, Chills, Night Sweats, Fatigue, Unexpected changes in weight  HENT:  Ear pain, Tinnitus, Nosebleeds, Trouble swallowing, Hearing loss, Sore throat  Cardiovascular:  Chest Pain, Palpitations, Orthopnea, Paroxysmal Nocturnal Dyspnea  Respiratory:  Cough, Wheezing, Shortness of breath, Chest tightness, Apnea  Gastrointestinal:  Nausea, Vomiting, Diarrhea, Constipation, Heartburn, Blood in stool  Genitourinary:  Difficulty or painful urination, Flank pain, Change in frequency, Urgency  Skin:  Color change, Rash, Itching, Wound  Musculoskeletal:  Joint pain, Back pain, Gait problems, Joint swelling, Myalgias  Neurological:  Dizziness, Headaches, Presyncope, Numbness, Seizures, Tremors  Endocrine:  Heat Intolerance, Cold Intolerance, Polydipsia, Polyphagia, Polyuria    OBJECTIVE:  PHYSICAL EXAM:  VS:  126/63, 97.9, 77, 18, 285 blood glucose, 94% on room aiur  Weight in pounds:  224.6  Pain: Denies    VS reviewed.   General Appearance:  chronically ill and debilitated, in no acute distress  Head: normocephalic and atraumatic  Eyes: conjunctivae and eye lids without erythema  ENT: external ear and ear canal normal bilaterally, nose without deformity, nasal mucosa and turbinates normal without polyps, oropharynx normal, dentition is normal for age, no lip or gum lesions noted  Neck: supple and non-tender without mass, no thyromegaly or thyroid nodules, no cervical lymphadenopathy  Pulmonary/Chest: clear but diminished to auscultation bilaterally- no wheezes, rales or rhonchi, normal air movement, no respiratory distress or retractions, requires no supplemental oxygen  Cardiovascular: normal rate, regular rhythm, normal S1 and S2, no murmurs, rubs, clicks, or gallops, distal pulses intact  Abdomen: soft, non-tender, non-distended, bowel sounds physiologic,  no rebound or guarding, no masses or hernias noted. Liver and spleen without enlargement. Extremities: no cyanosis, clubbing or edema of the lower extremities  Musculoskeletal: No joint swelling or gross deformity , right side hemiplegia. Chronic Right arm and hand edema  Neuro:  Alert, 4/5 strength globally and symmetrically, normal speech, no focal findings or movement disorder noted  Psych:  Normal affect without evidence of depression or anxiety, insight and judgement are intact, memory appears intact  Skin: warm and dry, no rash or erythema    ASSESSMENT & PLAN:    1. Hemiplegia of right dominant side due to noncerebrovascular etiology, unspecified hemiplegia type (HCC)  Chronic, status post cva. Right arm and hand edema is chronic. Asking staff to apply a compression glove and/or sleeve and to elevate right hand as tolerates. Asking OT to eval and treat. Continue BP control, ASA and statin. 2. Aphasia as late effect of cerebrovascular accident  Communicates well with staff. Patient is pleasant and compliant. 3. S/P stroke due to cerebrovascular disease  As per above.   Continue Coumadin with dosing. 4. Essential hypertension  BP is at goal with Amlodipine/Benazepril. 5. Pure hypercholesterolemia:  Continue ASA and Statin. 6. Diabetes mellitus type 2 in obese (HCC)  Blood glucose levels at goal.  Continue Januvia, Amaryl, Humalin R, Metformin. 7. Recurrent major depressive disorder, in partial remission ([facility] [address]  Controlled with Citalopram.    8. GERD  Continue Protonix. Disposition:  Assessment and plan as stated above. Follow-up per routine and as warranted. Plan of care reviewed by Dr. Shaylee Oakes DO.   Electronically signed by AYAKA Serrano NP on 4/19/2019 at 3:05 PM

## 2019-04-26 ENCOUNTER — OUTSIDE SERVICES (OUTPATIENT)
Dept: FAMILY MEDICINE CLINIC | Age: 79
End: 2019-04-26
Payer: MEDICARE

## 2019-04-26 DIAGNOSIS — R06.09 DOE (DYSPNEA ON EXERTION): ICD-10-CM

## 2019-04-26 PROCEDURE — 99309 SBSQ NF CARE MODERATE MDM 30: CPT | Performed by: NURSE PRACTITIONER

## 2019-04-27 PROBLEM — R06.09 DOE (DYSPNEA ON EXERTION): Status: ACTIVE | Noted: 2019-04-27

## 2019-04-27 NOTE — PATIENT INSTRUCTIONS
[address] Progress Note    NAME: Mahendra Man  DATE: 19  ROOM #: B 27-2  : 1940  REASON FOR VISIT: Other (Shortness of breath and oxygen desaturation during therapy.)    CODE STATUS: Full Code    History obtained from chart review, the patient and staff. SUBJECTIVE:  HPI: Mahendra Man is a 70 y.o. female. Patient has a PMH significant for:  Past Medical History:   Diagnosis Date    CAD (coronary artery disease)     Cerebrovascular disease     Depression     Hyperlipidemia     Hypertension     Self-care deficit for dressing and grooming     Self-care deficit for medication administration     Type II or unspecified type diabetes mellitus without mention of complication, not stated as uncontrolled     Unspecified cerebral artery occlusion with cerebral infarction        Pt seen and examined at bedside today for therapy staff's concerns of oxygen desaturation on room air during therapy. She is noted to be in no acute distress. Staff provide appropriate updates; appreciate staff input. I have reviewed patients past medical, surgical, social, and family history and have made updates where appropriate. Allergies and Medications were reviewed through the [facility] EMR.     Patient Active Problem List    Diagnosis Date Noted    Sepsis due to urinary tract infection ([facility] [facility] 2013     Priority: High     Class: Acute     replace inactive diagnosis      Delirium 2013     Priority: High     Class: Acute    Diarrhea in adult patient 2013     Priority: High     Class: Acute    DENIS (dyspnea on exertion) 2019    [facility] 2018    Spastic hemiplegia of right dominant side due to nontraumatic intraparenchymal hemorrhage of brain ([facility] 2018    BCC (basal cell carcinoma), arm, left 2017    Recurrent major depressive disorder, in partial remission ([facility] [facility] 2017    Ambulatory dysfunction     Bleeding on Coumadin     CVA, old, aphasia     Hemarthrosis of left knee 04/13/2015    Nausea and vomiting 04/13/2015    [facility] 04/13/2015    Leukocytosis 04/13/2015    Iron deficiency anemia 01/29/2015    Medication monitoring encounter 01/29/2015    Ileus ([facility] 12/15/2014    Abdominal pain 12/14/2014    Leukocytosis 12/14/2014    Pleural effusion 12/14/2014    History of stroke 12/14/2014    Anemia     Depression 01/21/2014    Skin neoplasm 07/11/2013    Myxoma of heart 13/29/6264    Folliculitis, lt face.  05/17/2012    OAB (overactive bladder) 01/23/2012    Urinary incontinence 01/23/2012    S/P stroke due to cerebrovascular disease 10/21/2011    Hemiplegia affecting right dominant side ([facility] 10/21/2011    Aphasia as late effect of cerebrovascular accident 10/21/2011    HTN (hypertension) 10/12/2011    Hyperlipidemia 10/12/2011    OA (osteoarthritis), multiple joints 10/12/2011    GERD (gastroesophageal reflux disease) 10/12/2011    S/P TKR (total knee replacement), bilateral 10/12/2011       REVIEW OF SYSTEMS  Positive responses are highlighted in bold  Constitutional:  Fever, Chills, Night Sweats, Fatigue, Unexpected changes in weight  HENT:  Ear pain, Tinnitus, Nosebleeds, Trouble swallowing, Hearing loss, Sore throat  Cardiovascular:  Chest Pain, Palpitations, Orthopnea, Paroxysmal Nocturnal Dyspnea  Respiratory:  Cough, Wheezing, Shortness of breath, Chest tightness, Apnea  Gastrointestinal:  Nausea, Vomiting, Diarrhea, Constipation, Heartburn, Blood in stool  Genitourinary:  Difficulty or painful urination, Flank pain, Change in frequency, Urgency  Skin:  Color change, Rash, Itching, Wound  Musculoskeletal:  Joint pain, Back pain, Gait problems, Joint swelling, Myalgias  Neurological:  Dizziness, Headaches, Presyncope, Numbness, Seizures, Tremors  Endocrine:  Heat Intolerance, Cold Intolerance, Polydipsia, Polyphagia, Polyuria    OBJECTIVE:  PHYSICAL EXAM:  VS:  126/63, 97.9, 77, 18, 174 bs, 94% on room air. Weight in pounds:  228  Pain: Denies    VS reviewed. General Appearance: well developed and well- nourished, in no acute distress, debilitated  Head: normocephalic and atraumatic  Eyes: conjunctivae and eye lids without erythema  ENT: external ear and ear canal normal bilaterally, nose without deformity, nasal mucosa and turbinates normal without polyps, oropharynx normal, dentition is normal for age, no lip or gum lesions noted  Neck: supple and non-tender without mass, no thyromegaly or thyroid nodules, no cervical lymphadenopathy  Pulmonary/Chest: clear but diminished to auscultation bilaterally- no wheezes, rales or rhonchi, normal air movement, no respiratory distress or retractions, requires no supplemental oxygen  Cardiovascular: normal rate, regular rhythm, normal S1 and S2, no murmurs, rubs, clicks, or gallops, distal pulses intact  Abdomen: soft, non-tender, non-distended, bowel sounds physiologic,  no rebound or guarding, no masses or hernias noted. Liver and spleen without enlargement. Extremities: no cyanosis, clubbing or edema of the lower extremities  Musculoskeletal: No joint swelling or gross deformity, ambulates throughout the facility with a manual wheelchair  Neuro:  Alert, 4/5 strength globally and symmetrically, normal speech, no focal findings or movement disorder noted, history of right sided hemiplegia  Psych:  Normal affect without evidence of depression or anxiety, insight and judgement are impaired, memory appears impaired  Skin: warm and dry, no rash or erythema    ASSESSMENT & PLAN:    1. DENIS (dyspnea on exertion)  Therapy staff reports oxygen desaturations in the 80s during therapy. She recovers promptly with rest.  Patient denies sob, chest congestion, chest pain. She is wearing compression stockings to BLE this morning, however, her ankles are a bit puffy. Right hand is also a bit puffy due to dependent edema.   Staff is ordering a compression glove/sleeve. 4/26 CXR reveals bilateral mild pulmonary edema, which is new from prior CXRs. Initiating lasix 20  Mg po once daily x 3 days. 4/26 BMP is stable. Will repeat BMP on Monday. Appreciate staff input and close monitoring. Disposition:  Assessment and plan as stated above. Follow-up per routine and as warranted. Plan of care reviewed by Dr. John Gil DO.   Electronically signed by AYAKA Fritz NP on 4/27/2019 at 9:29 AM

## 2019-04-29 ENCOUNTER — HOSPITAL ENCOUNTER (INPATIENT)
Age: 79
LOS: 4 days | Discharge: NURSING, REHAB OR LONG-TERM CARE | DRG: 394 | End: 2019-05-03
Attending: EMERGENCY MEDICINE | Admitting: FAMILY MEDICINE
Payer: MEDICARE

## 2019-04-29 ENCOUNTER — APPOINTMENT (OUTPATIENT)
Dept: GENERAL RADIOLOGY | Age: 79
DRG: 394 | End: 2019-04-29
Payer: MEDICARE

## 2019-04-29 ENCOUNTER — OUTSIDE SERVICES (OUTPATIENT)
Dept: FAMILY MEDICINE CLINIC | Age: 79
End: 2019-04-29
Payer: MEDICARE

## 2019-04-29 DIAGNOSIS — D64.9 HEMOGLOBIN LOW: Primary | ICD-10-CM

## 2019-04-29 DIAGNOSIS — K92.2 LOWER GI BLEED: Primary | ICD-10-CM

## 2019-04-29 DIAGNOSIS — D64.9 ANEMIA, UNSPECIFIED TYPE: ICD-10-CM

## 2019-04-29 LAB
ABO: NORMAL
ALBUMIN SERPL-MCNC: 3.2 G/DL (ref 3.5–5.1)
ALP BLD-CCNC: 67 U/L (ref 38–126)
ALT SERPL-CCNC: 8 U/L (ref 11–66)
ANION GAP SERPL CALCULATED.3IONS-SCNC: 11 MEQ/L (ref 8–16)
APTT: 36.2 SECONDS (ref 22–38)
AST SERPL-CCNC: 9 U/L (ref 5–40)
BASOPHILIA: ABNORMAL
BASOPHILS # BLD: 1 %
BASOPHILS ABSOLUTE: 0.1 THOU/MM3 (ref 0–0.1)
BILIRUB SERPL-MCNC: < 0.2 MG/DL (ref 0.3–1.2)
BUN BLDV-MCNC: 21 MG/DL (ref 7–22)
CALCIUM SERPL-MCNC: 8.3 MG/DL (ref 8.5–10.5)
CHLORIDE BLD-SCNC: 100 MEQ/L (ref 98–111)
CO2: 26 MEQ/L (ref 23–33)
CREAT SERPL-MCNC: 1.2 MG/DL (ref 0.4–1.2)
EKG ATRIAL RATE: 76 BPM
EKG P AXIS: 67 DEGREES
EKG P-R INTERVAL: 196 MS
EKG Q-T INTERVAL: 416 MS
EKG QRS DURATION: 82 MS
EKG QTC CALCULATION (BAZETT): 468 MS
EKG R AXIS: -30 DEGREES
EKG T AXIS: 67 DEGREES
EKG VENTRICULAR RATE: 76 BPM
EOSINOPHIL # BLD: 3.5 %
EOSINOPHILS ABSOLUTE: 0.5 THOU/MM3 (ref 0–0.4)
ERYTHROCYTE [DISTWIDTH] IN BLOOD BY AUTOMATED COUNT: 17.8 % (ref 11.5–14.5)
ERYTHROCYTE [DISTWIDTH] IN BLOOD BY AUTOMATED COUNT: 42.5 FL (ref 35–45)
GFR SERPL CREATININE-BSD FRML MDRD: 44 ML/MIN/1.73M2
GLUCOSE BLD-MCNC: 194 MG/DL (ref 70–108)
GLUCOSE BLD-MCNC: 198 MG/DL (ref 70–108)
HCT VFR BLD CALC: 25.4 % (ref 37–47)
HEMOCCULT STL QL: NEGATIVE
HEMOGLOBIN: 7.2 GM/DL (ref 12–16)
HYPOCHROMIA: PRESENT
IMMATURE GRANS (ABS): 0.06 THOU/MM3 (ref 0–0.07)
IMMATURE GRANULOCYTES: 0.5 %
INR BLD: 2.17 (ref 0.85–1.13)
LYMPHOCYTES # BLD: 24.3 %
LYMPHOCYTES ABSOLUTE: 3.2 THOU/MM3 (ref 1–4.8)
MCH RBC QN AUTO: 18.9 PG (ref 26–33)
MCHC RBC AUTO-ENTMCNC: 28.3 GM/DL (ref 32.2–35.5)
MCV RBC AUTO: 66.7 FL (ref 81–99)
MICROCYTES: PRESENT
MONOCYTES # BLD: 8.8 %
MONOCYTES ABSOLUTE: 1.1 THOU/MM3 (ref 0.4–1.3)
NUCLEATED RED BLOOD CELLS: 0 /100 WBC
OSMOLALITY CALCULATION: 282.1 MOSMOL/KG (ref 275–300)
PLATELET # BLD: 492 THOU/MM3 (ref 130–400)
PMV BLD AUTO: 9.7 FL (ref 9.4–12.4)
POIKILOCYTES: ABNORMAL
POTASSIUM REFLEX MAGNESIUM: 4.7 MEQ/L (ref 3.5–5.2)
RBC # BLD: 3.81 MILL/MM3 (ref 4.2–5.4)
RH FACTOR: NORMAL
SCAN OF BLOOD SMEAR: NORMAL
SEG NEUTROPHILS: 61.9 %
SEGMENTED NEUTROPHILS ABSOLUTE COUNT: 8 THOU/MM3 (ref 1.8–7.7)
SODIUM BLD-SCNC: 137 MEQ/L (ref 135–145)
TOTAL PROTEIN: 6.9 G/DL (ref 6.1–8)
TROPONIN T: < 0.01 NG/ML
WBC # BLD: 13 THOU/MM3 (ref 4.8–10.8)

## 2019-04-29 PROCEDURE — 2709999900 HC NON-CHARGEABLE SUPPLY

## 2019-04-29 PROCEDURE — 6370000000 HC RX 637 (ALT 250 FOR IP): Performed by: FAMILY MEDICINE

## 2019-04-29 PROCEDURE — C9113 INJ PANTOPRAZOLE SODIUM, VIA: HCPCS | Performed by: INTERNAL MEDICINE

## 2019-04-29 PROCEDURE — 85730 THROMBOPLASTIN TIME PARTIAL: CPT

## 2019-04-29 PROCEDURE — 86901 BLOOD TYPING SEROLOGIC RH(D): CPT

## 2019-04-29 PROCEDURE — 74022 RADEX COMPL AQT ABD SERIES: CPT

## 2019-04-29 PROCEDURE — 82272 OCCULT BLD FECES 1-3 TESTS: CPT

## 2019-04-29 PROCEDURE — 85014 HEMATOCRIT: CPT

## 2019-04-29 PROCEDURE — 85025 COMPLETE CBC W/AUTO DIFF WBC: CPT

## 2019-04-29 PROCEDURE — 1200000003 HC TELEMETRY R&B

## 2019-04-29 PROCEDURE — 2580000003 HC RX 258: Performed by: INTERNAL MEDICINE

## 2019-04-29 PROCEDURE — 36415 COLL VENOUS BLD VENIPUNCTURE: CPT

## 2019-04-29 PROCEDURE — 82948 REAGENT STRIP/BLOOD GLUCOSE: CPT

## 2019-04-29 PROCEDURE — 99308 SBSQ NF CARE LOW MDM 20: CPT | Performed by: NURSE PRACTITIONER

## 2019-04-29 PROCEDURE — 85018 HEMOGLOBIN: CPT

## 2019-04-29 PROCEDURE — 99285 EMERGENCY DEPT VISIT HI MDM: CPT

## 2019-04-29 PROCEDURE — 99223 1ST HOSP IP/OBS HIGH 75: CPT | Performed by: FAMILY MEDICINE

## 2019-04-29 PROCEDURE — 85610 PROTHROMBIN TIME: CPT

## 2019-04-29 PROCEDURE — 86900 BLOOD TYPING SEROLOGIC ABO: CPT

## 2019-04-29 PROCEDURE — 6360000002 HC RX W HCPCS: Performed by: INTERNAL MEDICINE

## 2019-04-29 PROCEDURE — 84484 ASSAY OF TROPONIN QUANT: CPT

## 2019-04-29 PROCEDURE — 80053 COMPREHEN METABOLIC PANEL: CPT

## 2019-04-29 PROCEDURE — 93005 ELECTROCARDIOGRAM TRACING: CPT | Performed by: EMERGENCY MEDICINE

## 2019-04-29 RX ORDER — OXYBUTYNIN CHLORIDE 10 MG/1
10 TABLET, EXTENDED RELEASE ORAL DAILY
Status: DISCONTINUED | OUTPATIENT
Start: 2019-04-29 | End: 2019-05-03 | Stop reason: HOSPADM

## 2019-04-29 RX ORDER — INSULIN GLARGINE 100 [IU]/ML
12 INJECTION, SOLUTION SUBCUTANEOUS NIGHTLY
Status: DISCONTINUED | OUTPATIENT
Start: 2019-04-29 | End: 2019-04-30

## 2019-04-29 RX ORDER — AMLODIPINE BESYLATE 10 MG/1
10 TABLET ORAL DAILY
Status: DISCONTINUED | OUTPATIENT
Start: 2019-04-29 | End: 2019-05-03 | Stop reason: HOSPADM

## 2019-04-29 RX ORDER — SODIUM CHLORIDE 0.9 % (FLUSH) 0.9 %
10 SYRINGE (ML) INJECTION PRN
Status: DISCONTINUED | OUTPATIENT
Start: 2019-04-29 | End: 2019-05-03 | Stop reason: HOSPADM

## 2019-04-29 RX ORDER — ONDANSETRON 2 MG/ML
4 INJECTION INTRAMUSCULAR; INTRAVENOUS EVERY 6 HOURS PRN
Status: DISCONTINUED | OUTPATIENT
Start: 2019-04-29 | End: 2019-05-03 | Stop reason: HOSPADM

## 2019-04-29 RX ORDER — CITALOPRAM 20 MG/1
10 TABLET ORAL DAILY
Status: DISCONTINUED | OUTPATIENT
Start: 2019-04-29 | End: 2019-05-03 | Stop reason: HOSPADM

## 2019-04-29 RX ORDER — DEXTROSE MONOHYDRATE 25 G/50ML
12.5 INJECTION, SOLUTION INTRAVENOUS PRN
Status: DISCONTINUED | OUTPATIENT
Start: 2019-04-29 | End: 2019-05-03 | Stop reason: HOSPADM

## 2019-04-29 RX ORDER — PANTOPRAZOLE SODIUM 40 MG/1
40 TABLET, DELAYED RELEASE ORAL DAILY
Status: DISCONTINUED | OUTPATIENT
Start: 2019-04-29 | End: 2019-04-30

## 2019-04-29 RX ORDER — LISINOPRIL 20 MG/1
20 TABLET ORAL DAILY
Status: DISCONTINUED | OUTPATIENT
Start: 2019-04-29 | End: 2019-05-03 | Stop reason: HOSPADM

## 2019-04-29 RX ORDER — ACETAMINOPHEN 325 MG/1
650 TABLET ORAL EVERY 4 HOURS PRN
Status: DISCONTINUED | OUTPATIENT
Start: 2019-04-29 | End: 2019-05-03 | Stop reason: HOSPADM

## 2019-04-29 RX ORDER — DEXTROSE MONOHYDRATE 50 MG/ML
100 INJECTION, SOLUTION INTRAVENOUS PRN
Status: DISCONTINUED | OUTPATIENT
Start: 2019-04-29 | End: 2019-05-03 | Stop reason: HOSPADM

## 2019-04-29 RX ORDER — NICOTINE POLACRILEX 4 MG
15 LOZENGE BUCCAL PRN
Status: DISCONTINUED | OUTPATIENT
Start: 2019-04-29 | End: 2019-05-03 | Stop reason: HOSPADM

## 2019-04-29 RX ORDER — PANTOPRAZOLE SODIUM 40 MG/10ML
40 INJECTION, POWDER, LYOPHILIZED, FOR SOLUTION INTRAVENOUS EVERY 12 HOURS
Status: DISCONTINUED | OUTPATIENT
Start: 2019-04-29 | End: 2019-05-02

## 2019-04-29 RX ORDER — 0.9 % SODIUM CHLORIDE 0.9 %
10 VIAL (ML) INJECTION EVERY 12 HOURS
Status: DISCONTINUED | OUTPATIENT
Start: 2019-04-29 | End: 2019-05-02

## 2019-04-29 RX ORDER — AMLODIPINE BESYLATE AND BENAZEPRIL HYDROCHLORIDE 10; 20 MG/1; MG/1
1 CAPSULE ORAL DAILY
Status: DISCONTINUED | OUTPATIENT
Start: 2019-04-29 | End: 2019-04-29 | Stop reason: CLARIF

## 2019-04-29 RX ORDER — SIMVASTATIN 20 MG
20 TABLET ORAL NIGHTLY
Status: DISCONTINUED | OUTPATIENT
Start: 2019-04-29 | End: 2019-05-03 | Stop reason: HOSPADM

## 2019-04-29 RX ORDER — SODIUM CHLORIDE 9 MG/ML
INJECTION, SOLUTION INTRAVENOUS CONTINUOUS
Status: DISCONTINUED | OUTPATIENT
Start: 2019-04-29 | End: 2019-05-01

## 2019-04-29 RX ORDER — 0.9 % SODIUM CHLORIDE 0.9 %
500 INTRAVENOUS SOLUTION INTRAVENOUS ONCE
Status: DISCONTINUED | OUTPATIENT
Start: 2019-04-29 | End: 2019-04-29 | Stop reason: HOSPADM

## 2019-04-29 RX ORDER — SODIUM CHLORIDE 0.9 % (FLUSH) 0.9 %
10 SYRINGE (ML) INJECTION EVERY 12 HOURS SCHEDULED
Status: DISCONTINUED | OUTPATIENT
Start: 2019-04-29 | End: 2019-05-03 | Stop reason: HOSPADM

## 2019-04-29 RX ADMIN — SODIUM CHLORIDE: 9 INJECTION, SOLUTION INTRAVENOUS at 20:57

## 2019-04-29 RX ADMIN — PANTOPRAZOLE SODIUM 40 MG: 40 INJECTION, POWDER, FOR SOLUTION INTRAVENOUS at 20:57

## 2019-04-29 RX ADMIN — INSULIN LISPRO 1 UNITS: 100 INJECTION, SOLUTION INTRAVENOUS; SUBCUTANEOUS at 20:57

## 2019-04-29 RX ADMIN — INSULIN GLARGINE 12 UNITS: 100 INJECTION, SOLUTION SUBCUTANEOUS at 20:58

## 2019-04-29 RX ADMIN — Medication 10 ML: at 20:58

## 2019-04-29 ASSESSMENT — ENCOUNTER SYMPTOMS
SHORTNESS OF BREATH: 0
RHINORRHEA: 0
VOMITING: 0
BLOOD IN STOOL: 1
DIARRHEA: 0
ABDOMINAL PAIN: 0
SORE THROAT: 0
EYE DISCHARGE: 0
BACK PAIN: 0
NAUSEA: 0
EYE PAIN: 0
WHEEZING: 0
COUGH: 0

## 2019-04-29 NOTE — ED TRIAGE NOTES
Pt AURORA jolley from [facility] for low hemoglobin and GI bleeding. Pt states no pain at this time, a/ox1.

## 2019-04-29 NOTE — PATIENT INSTRUCTIONS
[address] Progress Note    NAME: Mau Sheth  DATE: 19  ROOM #: B 27-2  : 1940  REASON FOR VISIT: Other (F/U visit regarding declining h&h and stool positive for occult blood.)    CODE STATUS: Full Code    History obtained from chart review, the patient and staff. SUBJECTIVE:  HPI: Mau Sheth is a 70 y.o. female. Patient has a PMH significant for:  Past Medical History:   Diagnosis Date    CAD (coronary artery disease)     Cerebrovascular disease     Depression     Hyperlipidemia     Hypertension     Self-care deficit for dressing and grooming     Self-care deficit for medication administration     Type II or unspecified type diabetes mellitus without mention of complication, not stated as uncontrolled     Unspecified cerebral artery occlusion with cerebral infarction        Pt seen and examined at bedside today for drop in H&H and stool that is positive for occult blood. She is noted to be in no acute distress. Staff provide appropriate updates; appreciate staff input. I have reviewed patients past medical, surgical, social, and family history and have made updates where appropriate. Allergies and Medications were reviewed through the [facility] EMR.     Patient Active Problem List    Diagnosis Date Noted    Sepsis due to urinary tract infection ([facility] [address] 2013     Priority: High     Class: Acute     replace inactive diagnosis      Delirium 2013     Priority: High     Class: Acute    Diarrhea in adult patient 2013     Priority: High     Class: Acute    DENIS (dyspnea on exertion) 2019    [facility]BO [facility] bowel obstruction) ([facility]) 2018    Spastic hemiplegia of right dominant side due to nontraumatic intraparenchymal hemorrhage of brain ([address] 2018    BCC (basal cell carcinoma), arm, left 2017    Recurrent major depressive disorder, in partial remission ([address] 2017    Ambulatory dysfunction     Bleeding on Coumadin     CVA, old, aphasia     Hemarthrosis of left knee 04/13/2015    Nausea and vomiting 04/13/2015    Diabetes mellitus type 2 in obese ([facility] 04/13/2015    Leukocytosis 04/13/2015    Iron deficiency anemia 01/29/2015    Medication monitoring encounter 01/29/2015    [facility] 12/15/2014    Abdominal pain 12/14/2014    Leukocytosis 12/14/2014    Pleural effusion 12/14/2014    History of stroke 12/14/2014    Anemia     Depression 01/21/2014    Skin neoplasm 07/11/2013    Myxoma of heart 37/59/5176    Folliculitis, lt face.  05/17/2012    OAB (overactive bladder) 01/23/2012    Urinary incontinence 01/23/2012    S/P stroke due to cerebrovascular disease 10/21/2011    Hemiplegia affecting right dominant side ([facility] 10/21/2011    Aphasia as late effect of cerebrovascular accident 10/21/2011    HTN (hypertension) 10/12/2011    Hyperlipidemia 10/12/2011    OA (osteoarthritis), multiple joints 10/12/2011    GERD (gastroesophageal reflux disease) 10/12/2011    S/P TKR (total knee replacement), bilateral 10/12/2011       REVIEW OF SYSTEMS  Positive responses are highlighted in bold  Constitutional:  Fever, Chills, Night Sweats, Fatigue, Unexpected changes in weight  HENT:  Ear pain, Tinnitus, Nosebleeds, Trouble swallowing, Hearing loss, Sore throat  Cardiovascular:  Chest Pain, Palpitations, Orthopnea, Paroxysmal Nocturnal Dyspnea  Respiratory:  Cough, Wheezing, Shortness of breath, Chest tightness, Apnea  Gastrointestinal:  Nausea, Vomiting, Diarrhea, Constipation, Heartburn, Blood in stool  Genitourinary:  Difficulty or painful urination, Flank pain, Change in frequency, Urgency  Skin:  Color change, Rash, Itching, Wound  Musculoskeletal:  Joint pain, Back pain, Gait problems, Joint swelling, Myalgias  Neurological:  Dizziness, Headaches, Presyncope, Numbness, Seizures, Tremors  Endocrine:  Heat Intolerance, Cold Intolerance, Polydipsia, Polyphagia, Polyuria    OBJECTIVE:  PHYSICAL EXAM:  VS:  193/53, 97.9, 73, 20, 390 blood glucose, 93% on room air  Weight in pounds:  228  Pain: Denies    VS reviewed. General Appearance: chronically ill and debilitated, in no acute distress  Head: normocephalic and atraumatic  Eyes: conjunctivae and eye lids without erythema  ENT: external ear and ear canal normal bilaterally, nose without deformity, nasal mucosa and turbinates normal without polyps, oropharynx normal, dentition is normal for age, no lip or gum lesions noted  Neck: supple and non-tender without mass, no thyromegaly or thyroid nodules, no cervical lymphadenopathy  Pulmonary/Chest: clear but diminished to auscultation bilaterally- no wheezes, rales or rhonchi, normal air movement, no respiratory distress or retractions, requires no supplemental oxygen  Cardiovascular: normal rate, regular rhythm, normal S1 and S2, no murmurs, rubs, clicks, or gallops, distal pulses intact  Abdomen: soft, non-tender, non-distended, bowel sounds physiologic,  no rebound or guarding, no masses or hernias noted. Liver and spleen without enlargement. Extremities: no cyanosis, clubbing or edema of the lower extremities  Musculoskeletal: No joint swelling or gross deformity   Neuro:  Alert, 4/5 strength globally and symmetrically, normal speech, no focal findings or movement disorder noted  Psych:  Normal affect without evidence of depression or anxiety, insight and judgement are impaired, memory appears impaired  Skin: warm and dry, no rash or erythema    ASSESSMENT & PLAN:    1. Hemoglobin low  Pt has a history of anemia. Hgb has been dropping - coumadin and ASA are on hold. Staff guiaced stool in-house and it is positive for occult blood. [facility], however, it has been > 1 year since she was last seen and they are not able to schedule an EGD/Colonoscopy without seeing her in the office first.  Will send patient to ER for evaluation. It is noted that VS have remained stable and she has remained on room air.   Denies chest pain/pressure/heaviness and sob at rest.    Disposition:  Assessment and plan as stated above. Plan of care reviewed by Dr. Gregory Cahnel DO.   Electronically signed by AYAKA Henao NP on 4/29/2019 at 3:41 PM

## 2019-04-29 NOTE — ED NOTES
Pt BIBA from [facility] where they stated she has had bloody stools and a low H&H. Pt denies any pain, a/ox1. Pt had a stroke several years ago and has right sided weakness. Dr. Jackson Essex at bedside for examination.       Mounika Herman RN  04/29/19 9714

## 2019-04-29 NOTE — ED PROVIDER NOTES
nervous/anxious. PAST MEDICAL HISTORY    has a past medical history of Atrial fibrillation ([facility] [address] CAD (coronary artery disease), Cerebrovascular disease, Depression, Hyperlipidemia, Hypertension, Self-care deficit for dressing and grooming, Self-care deficit for medication administration, Type II or unspecified type diabetes mellitus without mention of complication, not stated as uncontrolled, and Unspecified cerebral artery occlusion with cerebral infarction. SURGICAL HISTORY      has a past surgical history that includes joint replacement (2008); joint replacement (2009); Cholecystectomy (1965); Cardiac catheterization (2004); Hemorrhoid surgery (2001); and Colonoscopy. CURRENT MEDICATIONS       Previous Medications    AMLODIPINE-BENAZEPRIL (LOTREL) 10-20 MG PER CAPSULE    TAKE ONE CAPSULE BY MOUTH ONCE DAILY FOR HIGH  BLOOD PRESSURE    ASCORBIC ACID (VITAMIN C PO)    Take 1 tablet by mouth three times a week     ASPIRIN EC 81 MG EC TABLET    Take 81 mg by mouth daily. Indications: Cardiovascular Risk Reduction    CALCIUM CARBONATE (OSCAL) 500 MG TABS TABLET    Take 500 mg by mouth Three times weekly    CITALOPRAM (CELEXA) 10 MG TABLET    TAKE ONE TABLET BY MOUTH ONCE DAILY    GLIMEPIRIDE (AMARYL) 4 MG TABLET    Take 2 tablets by mouth daily    GLUCAGON HCL, RDNA, (GLUCAGEN HYPOKIT IJ)    Inject 1 mg as directed as needed. Indications: Extremely Low Blood Sugar    GLUCOSE BLOOD VI TEST STRIPS (ONE TOUCH TEST STRIPS) STRIP    Test blood sugar with One Touch Ultra 2 QID and as needed. INSULIN GLARGINE (LANTUS SOLOSTAR) 100 UNIT/ML INJECTION PEN    Inject 24 Units into the skin nightly     INSULIN PEN NEEDLE (PEN NEEDLES) 29G X 12MM MISC    by Does not apply route daily.     JANUVIA 100 MG TABLET    TAKE ONE TABLET BY MOUTH ONCE DAILY    LANTUS SOLOSTAR 100 UNIT/ML INJECTION PEN    INJECT 25 UNITS SUBCUTANEOUSLY NIGHTLY    LISINOPRIL (PRINIVIL;ZESTRIL) 40 MG TABLET    TAKE ONE TABLET BY MOUTH ONCE DAILY    METFORMIN (GLUCOPHAGE) 500 MG TABLET    TAKE ONE TABLET BY MOUTH ONCE DAILY WITH BREAKFAST    OXYBUTYNIN (DITROPAN-XL) 10 MG EXTENDED RELEASE TABLET    TAKE ONE TABLET BY MOUTH ONCE DAILY    PANTOPRAZOLE (PROTONIX) 40 MG TABLET    TAKE ONE TABLET BY MOUTH ONCE DAILY    RELION SHORT PEN NEEDLES 31G X 8 MM MISC    USE TO INJECT ONCE DAILY SUBCUTANEOUSLY    SIMVASTATIN (ZOCOR) 20 MG TABLET    TAKE ONE TABLET BY MOUTH ONCE DAILY AT  NIGHT    VITAMIN E 400 UNIT CAPSULE    Take 400 Units by mouth daily    WARFARIN (COUMADIN) 6 MG TABLET    Take as directed by [facility] 30 tabs = 30 days       ALLERGIES     is allergic to papaya derivatives and pcn [penicillins]. FAMILY HISTORY     indicated that her mother is . She indicated that her father is . family history includes Cancer in her mother. SOCIAL HISTORY      reports that she has never smoked. She has never used smokeless tobacco. She reports that she does not drink alcohol or use drugs. PHYSICAL EXAM     INITIAL VITALS:  height is 5' 6\" (1.676 m) and weight is 215 lb (97.5 kg). Her oral temperature is 98 °F (36.7 °C). Her blood pressure is 150/60 (abnormal) and her pulse is 78. Her respiration is 18 and oxygen saturation is 92%. CONSTITUTIONAL: [Awake, alert, non toxic, well developed, well nourished, no acute distress]  HEAD: [Normocephalic, atraumatic]  EYES: [Pupils equal, round & reactive to light, extraocular movements intact, no nystagmus, clear conjunctiva, non-icteric sclera]  ENT: [External ear canal clear without evidence of cerumen impaction or foreign body, TM's clear without erythema or bulging. Nares patent without drainage, septum appears midline. Moist mucus membranes, oropharynx clear without exudate, erythema, or mass. Uvula midline]  NECK: [Nontender and supple. No meningismus, no appreciated lymphadenopathy. Intact full range of motion. C-spine midline without vertebral tenderness.  Trachea midline.]  CHEST: [Inspection normal, no lesions, equal rise. No crepitus or tenderness upon palpation.]  CARDIOVASCULAR: [Regular rate, rhythm, normal S1 and S2. No appreciated murmurs, rubs, or gallops. No pulse deficits appreciated. Intact distal perfusion. JVD not appreciated.]  PULMONARY: [Respiratory distress absent. Respiratory effort normal. Breath sounds clear to auscultation without rhonchi, rales, or wheezing. No accessory muscle use. No stridor]  ABDOMEN: [Inspection normal, without surgical scars. Soft, non-tender, non-distended, with normoactive bowel sounds. No palpable masses, rebound, or guarding]  BACK: [Intact ROM. No midline vertebral tenderness, step off, or crepitus. No CVA tenderness.]  MUSCULOSKELETAL: [Extremities nontender to palpation. No gross deformity or evidence of external trauma. Intact range of motion. Sensation intact. No clubbing, cyanosis, or edema.]  SKIN: [Warm, dry. No jaundice, rash, urticaria, or petechiae]  NEUROLOGIC: [Alert and oriented x2 , GCS 15, Patient has mild dysarthria, a right sided facial droop, and residual right sided weakness consistent with her medical history of stroke, Cerebellar function grossly normal.]  PSYCHIATRIC: [Normal mood and affect, thought process is clear and linear]   RECTAL: [Normal tone, no gross bleeding. Blood tinged stool is present in the rectal vault, no masses or tenderness. Normal tone. Guaic negative/positive. External hemorrhoids are noted, no anal fissures were appreciated.]    DIFFERENTIAL DIAGNOSIS:   Anemia, electrolyte abnormality, dehydration, GI bleed    DIAGNOSTIC RESULTS     EKG: All EKG's are interpreted by the Emergency Department Physician who either signs or Co-signsthis chart in the absence of a cardiologist.    Vent. Rate: 76 bpm  NC interval: 196 ms  QRS duration: 82 ms  QTc: 468 ms  P-R-T axes: 67, -30, 67  No STEMI.  EKG gives impression of NSR    Compared to old EKG on 6-Sep-2018    RADIOLOGY: non-plain film images(s) such as CT,Ultrasound and MRI are read by the radiologist.    XR Acute Abd Series Chest 1 VW   Final Result   1. No evidence of acute cardiopulmonary process. 2. Nonspecific, nonobstructive bowel gas pattern. **This report has been created using voice recognition software. It may contain minor errors which are inherent in voice recognition technology. **      Final report electronically signed by Dr. La Olivas MD on 4/29/2019 4:50 PM           [] Visualized and interpreted by me   [x] Radiologist's Wet Read Report Reviewed   [] Discussed withRadiologist.    LABS:   Labs Reviewed   CBC WITH AUTO DIFFERENTIAL - Abnormal; Notable for the following components:       Result Value    WBC 13.0 (*)     RBC 3.81 (*)     Hemoglobin 7.2 (*)     Hematocrit 25.4 (*)     MCV 66.7 (*)     MCH 18.9 (*)     MCHC 28.3 (*)     RDW-CV 17.8 (*)     Platelets 829 (*)     Segs Absolute 8.0 (*)     Eosinophils # 0.5 (*)     All other components within normal limits   COMPREHENSIVE METABOLIC PANEL W/ REFLEX TO MG FOR LOW K - Abnormal; Notable for the following components:    Glucose 194 (*)     Calcium 8.3 (*)     Alb 3.2 (*)     Total Bilirubin <0.2 (*)     ALT 8 (*)     All other components within normal limits   PROTIME-INR - Abnormal; Notable for the following components:    INR 2.17 (*)     All other components within normal limits   GLOMERULAR FILTRATION RATE, ESTIMATED - Abnormal; Notable for the following components:    Est, Glom Filt Rate 44 (*)     All other components within normal limits   APTT   TROPONIN   ANION GAP   OSMOLALITY   SCAN OF BLOOD SMEAR   BLOOD OCCULT STOOL SCREEN #1   ABO/RH       EMERGENCY DEPARTMENT COURSE:   Vitals:    Vitals:    04/29/19 1514   BP: (!) 150/60   Pulse: 78   Resp: 18   Temp: 98 °F (36.7 °C)   TempSrc: Oral   SpO2: 92%   Weight: 215 lb (97.5 kg)   Height: 5' 6\" (1.676 m)     Based upon the patient's history, physical exam, and ED evaluation, I feel the patient's medical condition warrants admission to the hospital for further evaluation and treatment. I have discussed the patient with Dr. Pricila Reid who has agreed with the treatment plan and agreed to admit. I have transferred care of the patient to Dr. Pricila Reid. I have discussed the clinical presentation, work-up, and ED course thus far. CRITICAL CARE:   None    CONSULTS:  Dr. Pricila Reid for admission    PROCEDURES:  None    FINAL IMPRESSION      1. Lower GI bleed    2. Anemia, unspecified type          DISPOSITION/PLAN   admit    PATIENT REFERRED TO:  Temi Multani,   [address], [place] [address]  [address] [facility], [facility] [address] [address]  [phone]            DISCHARGE MEDICATIONS:  New Prescriptions    No medications on file       (Please note that portions ofthis note were completed with a voice recognition program.  Efforts were made to edit the dictations but occasionally words are mis-transcribed.)    Scribe:  Nat Valente 4/29/19 3:36 PM Scribing for and in the presence of Rekha Alvarez MD.    Signed by: Sabiha Sharp, 04/29/19 5:16 PM    Provider:  I personally performed the services described in the documentation, reviewed and edited the documentation which was dictated to the scribe in my presence, and it accurately records my words and actions.     Rekha Alvarez MD 4/29/19 5:16 PM         Rekha Alvarez MD  04/29/19 1806

## 2019-04-29 NOTE — ED NOTES
Bed: 020A  Expected date: 4/29/19  Expected time:   Means of arrival: [facility]  Comments:      David Allan RN  04/29/19 0367

## 2019-04-30 LAB
ABO: NORMAL
ANION GAP SERPL CALCULATED.3IONS-SCNC: 12 MEQ/L (ref 8–16)
ANTIBODY SCREEN: NORMAL
BUN BLDV-MCNC: 17 MG/DL (ref 7–22)
CALCIUM SERPL-MCNC: 7.9 MG/DL (ref 8.5–10.5)
CHLORIDE BLD-SCNC: 105 MEQ/L (ref 98–111)
CO2: 22 MEQ/L (ref 23–33)
CREAT SERPL-MCNC: 1 MG/DL (ref 0.4–1.2)
ERYTHROCYTE [DISTWIDTH] IN BLOOD BY AUTOMATED COUNT: 17.8 % (ref 11.5–14.5)
ERYTHROCYTE [DISTWIDTH] IN BLOOD BY AUTOMATED COUNT: 42.6 FL (ref 35–45)
FERRITIN: 3 NG/ML (ref 10–291)
GFR SERPL CREATININE-BSD FRML MDRD: 54 ML/MIN/1.73M2
GLUCOSE BLD-MCNC: 141 MG/DL (ref 70–108)
GLUCOSE BLD-MCNC: 170 MG/DL (ref 70–108)
GLUCOSE BLD-MCNC: 170 MG/DL (ref 70–108)
GLUCOSE BLD-MCNC: 191 MG/DL (ref 70–108)
GLUCOSE BLD-MCNC: 201 MG/DL (ref 70–108)
HCT VFR BLD CALC: 23.4 % (ref 37–47)
HCT VFR BLD CALC: 23.7 % (ref 37–47)
HCT VFR BLD CALC: 29.2 % (ref 37–47)
HEMOGLOBIN: 6.6 GM/DL (ref 12–16)
HEMOGLOBIN: 6.7 GM/DL (ref 12–16)
HEMOGLOBIN: 8.7 GM/DL (ref 12–16)
IRON SATURATION: 6 % (ref 20–50)
IRON: 18 UG/DL (ref 50–170)
MCH RBC QN AUTO: 18.6 PG (ref 26–33)
MCHC RBC AUTO-ENTMCNC: 27.8 GM/DL (ref 32.2–35.5)
MCV RBC AUTO: 66.9 FL (ref 81–99)
PLATELET # BLD: 467 THOU/MM3 (ref 130–400)
PMV BLD AUTO: 10.1 FL (ref 9.4–12.4)
POTASSIUM REFLEX MAGNESIUM: 4.1 MEQ/L (ref 3.5–5.2)
RBC # BLD: 3.54 MILL/MM3 (ref 4.2–5.4)
RH FACTOR: NORMAL
SODIUM BLD-SCNC: 139 MEQ/L (ref 135–145)
TOTAL IRON BINDING CAPACITY: 313 UG/DL (ref 171–450)
WBC # BLD: 11.2 THOU/MM3 (ref 4.8–10.8)

## 2019-04-30 PROCEDURE — 36430 TRANSFUSION BLD/BLD COMPNT: CPT

## 2019-04-30 PROCEDURE — P9016 RBC LEUKOCYTES REDUCED: HCPCS

## 2019-04-30 PROCEDURE — C9113 INJ PANTOPRAZOLE SODIUM, VIA: HCPCS | Performed by: INTERNAL MEDICINE

## 2019-04-30 PROCEDURE — 86923 COMPATIBILITY TEST ELECTRIC: CPT

## 2019-04-30 PROCEDURE — 6370000000 HC RX 637 (ALT 250 FOR IP): Performed by: INTERNAL MEDICINE

## 2019-04-30 PROCEDURE — 83540 ASSAY OF IRON: CPT

## 2019-04-30 PROCEDURE — 86901 BLOOD TYPING SEROLOGIC RH(D): CPT

## 2019-04-30 PROCEDURE — 99232 SBSQ HOSP IP/OBS MODERATE 35: CPT | Performed by: FAMILY MEDICINE

## 2019-04-30 PROCEDURE — 1200000003 HC TELEMETRY R&B

## 2019-04-30 PROCEDURE — 6370000000 HC RX 637 (ALT 250 FOR IP): Performed by: NURSE PRACTITIONER

## 2019-04-30 PROCEDURE — 2580000003 HC RX 258: Performed by: NURSE PRACTITIONER

## 2019-04-30 PROCEDURE — 85027 COMPLETE CBC AUTOMATED: CPT

## 2019-04-30 PROCEDURE — 36415 COLL VENOUS BLD VENIPUNCTURE: CPT

## 2019-04-30 PROCEDURE — 85018 HEMOGLOBIN: CPT

## 2019-04-30 PROCEDURE — 6370000000 HC RX 637 (ALT 250 FOR IP): Performed by: FAMILY MEDICINE

## 2019-04-30 PROCEDURE — 6360000002 HC RX W HCPCS: Performed by: INTERNAL MEDICINE

## 2019-04-30 PROCEDURE — 2580000003 HC RX 258: Performed by: FAMILY MEDICINE

## 2019-04-30 PROCEDURE — 86900 BLOOD TYPING SEROLOGIC ABO: CPT

## 2019-04-30 PROCEDURE — 6360000002 HC RX W HCPCS: Performed by: NURSE PRACTITIONER

## 2019-04-30 PROCEDURE — 2580000003 HC RX 258: Performed by: PHYSICIAN ASSISTANT

## 2019-04-30 PROCEDURE — 82728 ASSAY OF FERRITIN: CPT

## 2019-04-30 PROCEDURE — 86850 RBC ANTIBODY SCREEN: CPT

## 2019-04-30 PROCEDURE — 80048 BASIC METABOLIC PNL TOTAL CA: CPT

## 2019-04-30 PROCEDURE — 83550 IRON BINDING TEST: CPT

## 2019-04-30 PROCEDURE — 2580000003 HC RX 258: Performed by: INTERNAL MEDICINE

## 2019-04-30 PROCEDURE — 82948 REAGENT STRIP/BLOOD GLUCOSE: CPT

## 2019-04-30 PROCEDURE — 85014 HEMATOCRIT: CPT

## 2019-04-30 RX ORDER — SENNA PLUS 8.6 MG/1
15 TABLET ORAL ONCE
Status: COMPLETED | OUTPATIENT
Start: 2019-04-30 | End: 2019-04-30

## 2019-04-30 RX ORDER — POLYETHYLENE GLYCOL 3350 17 G/17G
238 POWDER, FOR SOLUTION ORAL ONCE
Status: COMPLETED | OUTPATIENT
Start: 2019-04-30 | End: 2019-04-30

## 2019-04-30 RX ORDER — 0.9 % SODIUM CHLORIDE 0.9 %
250 INTRAVENOUS SOLUTION INTRAVENOUS ONCE
Status: COMPLETED | OUTPATIENT
Start: 2019-04-30 | End: 2019-04-30

## 2019-04-30 RX ORDER — DOCUSATE SODIUM 100 MG/1
100 CAPSULE, LIQUID FILLED ORAL DAILY
COMMUNITY

## 2019-04-30 RX ORDER — SODIUM CHLORIDE 450 MG/100ML
INJECTION, SOLUTION INTRAVENOUS CONTINUOUS
Status: DISCONTINUED | OUTPATIENT
Start: 2019-04-30 | End: 2019-05-02

## 2019-04-30 RX ORDER — INSULIN GLARGINE 100 [IU]/ML
18 INJECTION, SOLUTION SUBCUTANEOUS NIGHTLY
Status: DISCONTINUED | OUTPATIENT
Start: 2019-04-30 | End: 2019-05-03 | Stop reason: HOSPADM

## 2019-04-30 RX ADMIN — SODIUM CHLORIDE 250 ML: 9 INJECTION, SOLUTION INTRAVENOUS at 10:25

## 2019-04-30 RX ADMIN — OXYBUTYNIN 10 MG: 10 TABLET, FILM COATED, EXTENDED RELEASE ORAL at 08:42

## 2019-04-30 RX ADMIN — SIMVASTATIN 20 MG: 20 TABLET, FILM COATED ORAL at 20:46

## 2019-04-30 RX ADMIN — PANTOPRAZOLE SODIUM 40 MG: 40 INJECTION, POWDER, FOR SOLUTION INTRAVENOUS at 20:46

## 2019-04-30 RX ADMIN — LISINOPRIL 20 MG: 20 TABLET ORAL at 08:42

## 2019-04-30 RX ADMIN — POLYETHYLENE GLYCOL 3350 238 G: 17 POWDER, FOR SOLUTION ORAL at 17:52

## 2019-04-30 RX ADMIN — Medication 10 ML: at 08:43

## 2019-04-30 RX ADMIN — INSULIN LISPRO 2 UNITS: 100 INJECTION, SOLUTION INTRAVENOUS; SUBCUTANEOUS at 17:50

## 2019-04-30 RX ADMIN — INSULIN GLARGINE 18 UNITS: 100 INJECTION, SOLUTION SUBCUTANEOUS at 22:42

## 2019-04-30 RX ADMIN — CITALOPRAM 10 MG: 20 TABLET, FILM COATED ORAL at 08:42

## 2019-04-30 RX ADMIN — AMLODIPINE BESYLATE 10 MG: 10 TABLET ORAL at 08:42

## 2019-04-30 RX ADMIN — Medication 10 ML: at 20:46

## 2019-04-30 RX ADMIN — PANTOPRAZOLE SODIUM 40 MG: 40 INJECTION, POWDER, FOR SOLUTION INTRAVENOUS at 08:42

## 2019-04-30 RX ADMIN — SODIUM CHLORIDE: 4.5 INJECTION, SOLUTION INTRAVENOUS at 15:26

## 2019-04-30 RX ADMIN — IRON SUCROSE 300 MG: 20 INJECTION, SOLUTION INTRAVENOUS at 15:26

## 2019-04-30 RX ADMIN — SENNOSIDES 129 MG: 8.6 TABLET, FILM COATED ORAL at 15:42

## 2019-04-30 RX ADMIN — Medication 10 ML: at 08:42

## 2019-04-30 RX ADMIN — SODIUM CHLORIDE 250 ML: 9 INJECTION, SOLUTION INTRAVENOUS at 05:59

## 2019-04-30 NOTE — CONSULTS
12. Prep ordered   13. NG tube insertion for prep ordered  14. Pre procedure orders placed   15. Will follow        HISTORY OF PRESENT ILLNESS     patient sitting in bed. She has no family in room. She has expressive aphasia and could not carry on a conversation. I tried to explain to her why I was seeing her, that she had the rectal bleeding while on Coumadin. I asked if she was wanting to get a colonoscopy done tomorrow because I would schedule it and order the prep to \"clean out\" her colon. She looked at me with big eyes and said \"I don't know\". Pain location none; had rectal bleeding          EGD in 2014 showed moderate chronic inflammation. Colonoscopy 2014 showed no reason for anemia; had polyps removed. She was on Coumadin for her hx CVA       PROBLEM LIST    does not have any pertinent problems on file. PAST MEDICAL HISTORY     has a past medical history of Atrial fibrillation ([facility] [address] CAD (coronary artery disease), Cerebrovascular disease, Depression, Hyperlipidemia, Hypertension, Self-care deficit for dressing and grooming, Self-care deficit for medication administration, Type II or unspecified type diabetes mellitus without mention of complication, not stated as uncontrolled, and Unspecified cerebral artery occlusion with cerebral infarction. PAST SURGICAL HISTORY      has a past surgical history that includes joint replacement (2008); joint replacement (2009); Cholecystectomy (1965); Cardiac catheterization (2004); Hemorrhoid surgery (2001); and Colonoscopy.       LABS:    CBC:   Recent Labs     04/29/19  1552 04/29/19  2347 04/30/19  0344   WBC 13.0*  --  11.2*   HGB 7.2* 6.7* 6.6*   *  --  467*     BMP:    Recent Labs     04/29/19  1552 04/30/19  0344    139   K 4.7 4.1    105   CO2 26 22*   BUN 21 17   CREATININE 1.2 1.0   GLUCOSE 194* 170*     Hepatic:   Recent Labs     04/29/19  1552   ALKPHOS 67   ALT 8*   AST 9   PROT 6.9   BILITOT <0.2*   LABALBU 3.2*     Amylase and Lipase:No results for input(s): LIPASE, AMYLASE in the last 72 hours. Lactic Acid: No results for input(s): LACTA in the last 72 hours. Calcium:  Recent Labs     04/30/19  0344   CALCIUM 7.9*     Ionized Calcium:No results for input(s): IONCA in the last 72 hours. Magnesium:No results for input(s): MG in the last 72 hours. Phosphorus:No results for input(s): PHOS in the last 72 hours. Troponin: No results for input(s): CKTOTAL, CKMB, TROPONINI in the last 72 hours. PT/INR:     Recent Labs     04/29/19  1552   INR 2.17*         REVIEW OF SYSTEMS:    GENERAL:  No chills or weight loss. EYES:  No  blurred vision  CARDIOVASCULAR:  No chest pain or palpitations. RESPIRATORY:  No dyspnea or cough. Is on O2 NC but denies sob  GI: rectal beeding   MUSCULOSKELETAL:  No new painful or swollen joints   :   na  SKIN:  No rashes or jaundice. NEUROLOGIC:   No headaches or  seizures  PSYCH:  No anxiety or depression. ENDOCRINE:   na  BLOOD:  ++ MICHELLE previously worked up in 2014, + blood or blood product transfusion. PHYSICAL EXAMINATION:      Vitals:    04/30/19 0651   BP: (!) 163/70   Pulse: 84   Resp: 16   Temp: 97.6 °F (36.4 °C)   SpO2: 94%     GEN: Well nourished, well developed. obese   LUNGS:  Clear but diminished  to auscultation bilaterally. Chest rises equally on inspiration. + abdominally breathing   CARDIOVASCULAR:  Regular rate and rhythm without murmurs, rubs or gallops. ABDOMEN:  Soft, large, nontender and nondistended with normal bowel sounds. EYES: BARBARA. ENT:  Ears symmetric, Neck supple, trachea midline, moist mucous membranes     EXTREMITIES:  No cyanosis, clubbing, or edema. DERM:  No rash or jaundice. NEURO:  Alert  But has expressive aphasia and cannot carry on conversation  PSYCHIATRIC: calm, seemed anxious when we discussed colon prep    HOME MEDICATIONS      Prior to Admission medications    Medication Sig Start Date End Date Taking?  Authorizing Provider docusate sodium (COLACE) 100 MG capsule Take 100 mg by mouth daily   Yes Historical Provider, MD   insulin regular (HUMULIN R;NOVOLIN R) 100 UNIT/ML injection Inject into the skin 4 times daily (before meals and nightly) 151-200= 2 units, 201-250= 4 units, 251-300= 6 units, 301-350= 8 units, 351-400= 10 units, 401+= call MD   Yes Historical Provider, MD   LANTUS SOLOSTAR 100 UNIT/ML injection pen INJECT 25 UNITS SUBCUTANEOUSLY NIGHTLY  Patient taking differently: INJECT 36 UNITS SUBCUTANEOUSLY NIGHTLY 2/11/19  Yes Unique Gamez MD   metFORMIN (GLUCOPHAGE) 500 MG tablet TAKE ONE TABLET BY MOUTH ONCE DAILY WITH BREAKFAST 12/26/18  Yes Unique Gamez MD   lisinopril (PRINIVIL;ZESTRIL) 40 MG tablet TAKE ONE TABLET BY MOUTH ONCE DAILY 11/26/18  Yes Unique Gamez MD   simvastatin (ZOCOR) 20 MG tablet TAKE ONE TABLET BY MOUTH ONCE DAILY AT  NIGHT 11/26/18  Yes Unique Gamez MD   oxybutynin (DITROPAN-XL) 10 MG extended release tablet TAKE ONE TABLET BY MOUTH ONCE DAILY 11/26/18  Yes Unique Gamez MD   pantoprazole (PROTONIX) 40 MG tablet TAKE ONE TABLET BY MOUTH ONCE DAILY 11/26/18  Yes Unique Gamez MD   citalopram (CELEXA) 10 MG tablet TAKE ONE TABLET BY MOUTH ONCE DAILY 11/26/18  Yes Unique Gamez MD   amLODIPine-benazepril (LOTREL) 10-20 MG per capsule TAKE ONE CAPSULE BY MOUTH ONCE DAILY FOR HIGH  BLOOD PRESSURE 10/31/18  Yes Unique Gamez MD   warfarin (COUMADIN) 6 MG tablet Take as directed by [facility] 30 tabs = 30 days  Patient taking differently: 5.5 mg Take as directed by [facility] 30 tabs = 30 days 10/25/18  Yes Oswaldo Grossman MD   Ascorbic Acid (VITAMIN C PO) Take 1 tablet by mouth three times a week    Yes Historical Provider, MD   calcium carbonate (OSCAL) 500 MG TABS tablet Take 500 mg by mouth Three times weekly   Yes Historical Provider, MD   aspirin EC 81 MG EC tablet Take 81 mg by mouth daily.  Indications: Cardiovascular Risk Reduction   Yes Historical Provider, MD WAGGONER 100 MG tablet TAKE ONE TABLET BY MOUTH ONCE DAILY 11/26/18   Koffi Palma MD   glimepiride (AMARYL) 4 MG tablet Take 2 tablets by mouth daily 7/3/18   Koffi Palma MD   RELION SHORT PEN NEEDLES 31G X 8 MM MISC USE TO INJECT ONCE DAILY SUBCUTANEOUSLY 2/6/18   Koffi Palma MD   vitamin E 400 UNIT capsule Take 400 Units by mouth daily    Historical Provider, MD   oxybutynin (DITROPAN XL) 10 MG CR tablet Take 1 tablet by mouth daily. 1/10/13 1/23/14  Koffi Palma MD   Glucagon HCl, rDNA, (GLUCAGEN HYPOKIT IJ) Inject 1 mg as directed as needed. Indications: Extremely Low Blood Sugar    Historical Provider, MD   glucose blood VI test strips (ONE TOUCH TEST STRIPS) strip Test blood sugar with One Touch Ultra 2 QID and as needed. 11/2/11   Koffi Palma MD   Insulin Pen Needle (PEN NEEDLES) 29G X 12MM MISC by Does not apply route daily. Historical Provider, MD       MEDICATIONS    Scheduled Meds:   sodium chloride  250 mL Intravenous Once    sodium chloride  250 mL Intravenous Once    citalopram  10 mg Oral Daily    insulin glargine  12 Units Subcutaneous Nightly    oxybutynin  10 mg Oral Daily    [Held by provider] pantoprazole  40 mg Oral Daily    simvastatin  20 mg Oral Nightly    insulin lispro  0-6 Units Subcutaneous TID WC    insulin lispro  0-3 Units Subcutaneous Nightly    sodium chloride flush  10 mL Intravenous 2 times per day    pantoprazole  40 mg Intravenous Q12H    And    sodium chloride (PF)  10 mL Intravenous Q12H    amLODIPine  10 mg Oral Daily    And    lisinopril  20 mg Oral Daily     Continuous Infusions:   sodium chloride 100 mL/hr at 04/29/19 2057    dextrose       PRN Meds:.sodium chloride flush, acetaminophen, ondansetron, glucose, dextrose, glucagon (rDNA), dextrose    ALLERGIES   is allergic to papaya derivatives and pcn [penicillins].     SOCIAL HISTORY    Social History  Social History     Socioeconomic History    Marital status:      Spouse name: Not on file    Number of children: 2    Years of education: Not on file    Highest education level: Not on file   Occupational History    Occupation: Housewife   Social Needs    Financial resource strain: Not on file    Food insecurity:     Worry: Not on file     Inability: Not on file   [employer] needs:     Medical: Not on file     Non-medical: Not on file   Tobacco Use    Smoking status: Never Smoker    Smokeless tobacco: Never Used   Substance and Sexual Activity    Alcohol use: No    Drug use: No    Sexual activity: Not Currently   Lifestyle    Physical activity:     Days per week: Not on file     Minutes per session: Not on file    Stress: Not on file   Relationships    Social connections:     Talks on phone: Not on file     Gets together: Not on file     Attends Scientologist service: Not on file     Active member of club or organization: Not on file     Attends meetings of clubs or organizations: Not on file     Relationship status: Not on file    Intimate partner violence:     Fear of current or ex partner: Not on file     Emotionally abused: Not on file     Physically abused: Not on file     Forced sexual activity: Not on file   Other Topics Concern    Not on file   Social History Narrative    Not on file       FAMILY HISTORY    family history includes Cancer in her mother. REVIEW OF DIAGNOSTIC TESTING AND LABS:      Hospitalist/Attending provider notes, consulting physician notes, laboratory results and procedure notes reviewed prior to seeing the patient.    Note done in collaboration with DR Ricardo Garcia MD  Electronically signed by AYAKA Gunn CNP on 4/30/19 at 8:12 AM

## 2019-04-30 NOTE — PLAN OF CARE
Problem: Falls - Risk of:  Goal: Will remain free from falls  Description  Will remain free from falls  Outcome: Ongoing  Note:   Patient and family using call light appropriately to call for assistance. Patient and family reports understanding of fall prevention when discussed. Bed alarm is in place. Problem: Falls - Risk of:  Goal: Absence of physical injury  Description  Absence of physical injury  Outcome: Ongoing  Note:   Patient is free from physical injury at this time. Problem: Risk for Impaired Skin Integrity  Goal: Tissue integrity - skin and mucous membranes  Description  Structural intactness and normal physiological function of skin and  mucous membranes. Outcome: Ongoing  Note:   Patients skin is warm, dry, and flaky. No other skin impairments noted. Patient understands the importance of frequent repositioning in order to prevent any skin breakdown. Problem: Neurological  Goal: Maximum potential motor/sensory/cognitive function  Outcome: Ongoing  Note:   Patient is alert to name, unable to verbalize situation, time, and place due to deficits from CVA. Able to make needs known with her words and pointing to what she needs. Problem: Cardiovascular  Goal: No DVT, peripheral vascular complications  Outcome: Ongoing  Note:   Patient without s/s of DVT. Patient able to wear SCDs to help prevent development of DVT as coumadin is on hold at this time. Problem: Respiratory  Goal: No pulmonary complications  Outcome: Ongoing  Note:   Patient is 92% on room air. Lungs are clear and diminished. Patient denies any shortness of breath at this time. Problem: GI  Goal: No bowel complications  Outcome: Ongoing  Note:   Patient with bowel sounds, passing flatus, and without nausea. Problem:   Goal: Adequate urinary output  Outcome: Ongoing  Note:   Patient has adequate urinary output from external catheter. Denies any burning upon urination.      Problem: Nutrition  Goal: Optimal nutrition therapy  Outcome: Ongoing  Note:   Patient is currently NPO with only ice chips at this time. Patient denies any nausea at this time. Problem: DAILY CARE  Goal: Daily care needs are met  Outcome: Ongoing  Note:   Daily needs met at this time. Problem: DISCHARGE BARRIERS  Goal: Patient's continuum of care needs are met  Outcome: Ongoing  Note:   Patient and family plan to return to [facility] at discharge.  assisting with discharge planning. Care plan reviewed with patient and family. Patient and family verbalize understanding of the plan of care and contribute to goal setting.

## 2019-04-30 NOTE — DISCHARGE INSTR - COC
Continuity of Care Form    Patient Name: Clemente Elena   :  1940  MRN:  [id number]    Admit date:  2019  Discharge date:  19    Code Status Order: Full Code   Advance Directives:   Advance Care Flowsheet Documentation     Date/Time Healthcare Directive Type of Healthcare Directive Copy in [address] Agent's Name Healthcare Agent's Phone Number    [phone]  No, patient does not have an advance directive for healthcare treatment -- -- -- -- --          Admitting Physician:  Ehsan Cunningham MD  PCP: Delisa Estrella DO    Discharging Nurse: [facility] Unit/Room#: 7K-10/010-A  Discharging Unit Phone Number: [phone]    Emergency Contact:   Extended Emergency Contact Information  Primary Emergency Contact: Sherly Vidales  Address: [address] [place] [id number] [address] Phone: [phone]  Mobile Phone: [phone]  Relation: Spouse  Secondary Emergency Contact: Madanna Adamsgh [address] Phone: [phone]  Mobile Phone: [phone]  Relation: Child    Past Surgical History:  Past Surgical History:   Procedure Laterality Date    CARDIAC CATHETERIZATION     [facility] REPLACEMENT      Lt Total Knee    JOINT REPLACEMENT      Rt Total Knee       Immunization History:   Immunization History   Administered Date(s) Administered    Influenza Virus Vaccine 10/21/2011, 01/10/2013, 10/15/2013, 10/28/2014, 10/20/2015    Influenza, High Dose (Fluzone 72 yrs and older) 10/24/2017    Influenza, [place], 3 Years and older, IM (Fluzone 3 yrs and older or Afluria 5 yrs and older) 10/25/2016    Pneumococcal 13-valent Conjugate (Nnlkapf96) 2017    Pneumococcal Conjugate 7-valent 10/01/2007       Active Problems:  Patient Active Problem List   Diagnosis Code    HTN (hypertension) I10    Hyperlipidemia E78.5    OA (osteoarthritis), multiple joints M19.90    GERD (gastroesophageal reflux disease) K21.9    S/P TKR (total knee replacement), bilateral Z96.659    S/P stroke due to cerebrovascular disease Z86.73    Hemiplegia affecting right dominant side ([facility]) G81.91    Aphasia as late effect of cerebrovascular accident I69.320    OAB (overactive bladder) N32.81    Urinary incontinence J66    Folliculitis, lt face.  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    Sepsis due to urinary tract infection ([facility]) A41.9, N39.0    Delirium R41.0    Diarrhea in adult patient R19.7    Skin neoplasm D49.2    Depression F32.9    Abdominal pain R10.9    Leukocytosis D72.829    Pleural effusion J90    History of stroke Z86.73    Anemia D64.9    Ileus ([facility]) K56.7    Iron deficiency anemia D50.9    Medication monitoring encounter Z51.81    Hemarthrosis of left knee M25.062    Nausea and vomiting R11.2    Diabetes mellitus type 2 in obese ([facility]) E11.69, E66.9    Leukocytosis D72.829    Ambulatory dysfunction R26.2    Bleeding on Coumadin R58, T45.515A    CVA, old, aphasia I69.5    Recurrent major depressive disorder, in partial remission ([facility]) F33.41    BCC (basal cell carcinoma), arm, left C44.619    Spastic hemiplegia of right dominant side due to nontraumatic intraparenchymal hemorrhage of brain ([facility]) I61.9, G81.11    SBO (small bowel obstruction) ([facility]) K56.609    DENIS (dyspnea on exertion) R06.09    GIB (gastrointestinal bleeding) K92.2       Isolation/Infection:   Isolation          No Isolation            Nurse Assessment:  Last Vital Signs: /65   Pulse 77   Temp 98.5 °F (36.9 °C) (Oral)   Resp 20   Ht 5' 2\" (1.575 m)   Wt 229 lb (103.9 kg)   LMP  (Exact Date)   SpO2 95%   BMI 41.88 kg/m²     Last documented pain score (0-10 scale):    Last Weight:   Wt Readings from Last 1 Encounters:   04/29/19 229 lb (103.9 kg)     Mental Status:  alert and logical    IV Access:  - None    Nursing Mobility/ADLs:  Walking   Dependent  Transfer  Dependent  Bathing  Dependent  Dressing  Dependent  Toileting  Dependent  Feeding  Assisted  Med Admin  Assisted  Med Delivery   whole    Wound Care Documentation and Therapy:        Elimination:  Continence:   · Bowel: No  · Bladder: No  Urinary Catheter: None   Colostomy/Ileostomy/Ileal Conduit: No       Date of Last BM: 5/1/19    Intake/Output Summary (Last 24 hours) at 4/30/2019 1149  Last data filed at 4/30/2019 1012  Gross per 24 hour   Intake 1537 ml   Output 900 ml   Net 637 ml     I/O last 3 completed shifts: In: 1686 [P.O.:240; I.V.:997]  Out: 900 [Urine:900]    Safety Concerns: At Risk for Falls    Impairments/Disabilities:      Speech and Paralysis - right side    Nutrition Therapy:  Current Nutrition Therapy:   - Oral Diet:  General    Routes of Feeding: Oral  Liquids: Thin Liquids  Daily Fluid Restriction: no  Last Modified Barium Swallow with Video (Video Swallowing Test): not done    Treatments at the Time of Hospital Discharge:   Respiratory Treatments: n/a  Oxygen Therapy:  is not on home oxygen therapy.   Ventilator:    - No ventilator support    Rehab Therapies: Physical Therapy and Occupational Therapy  Weight Bearing Status/Restrictions: No weight bearing restirctions  Other Medical Equipment (for information only, NOT a DME order):  wheelchair and hospital bed  Other Treatments: n/a    Patient's personal belongings (please select all that are sent with patient):  Johanny    RN SIGNATURE:  Electronically signed by Payam Martinez RN on 5/2/19 at 10:27 AM    CASE MANAGEMENT/SOCIAL WORK SECTION    Inpatient Status Date: 04/29/19    Readmission Risk Assessment Score:  Readmission Risk              Risk of Unplanned Readmission:        18           Discharging to Facility/ Agency   · Name: [facility]  · Address: [address] [place] [address]  · Phone: [phone]  · Fax: [phone]     Dialysis Facility (if applicable)   · Name:  · Address:  · Dialysis Schedule:  · Phone:  · Fax:    / signature: Electronically signed by NISHA Maynard on 4/30/2019 at 11:50 AM     PHYSICIAN SECTION    Prognosis: Fair    Condition at Discharge: Stable    Rehab Potential (if transferring to Rehab): Fair    Recommended Labs or Other Treatments After Discharge: n/a    Physician Certification: I certify the above information and transfer of Lisa Mclain  is necessary for the continuing treatment of the diagnosis listed and that she requires [place] for less 30 days.      Update Admission H&P: No change in H&P    PHYSICIAN SIGNATURE:  Electronically signed by Becca Rebollar MD on 5/3/19 at 2:01 PM

## 2019-04-30 NOTE — PROGRESS NOTES
Hospitalist Progress Note    Patient:  Jory Sotelo      Unit/Bed:7-10/010-A    YOB: 1940    MRN: [id number]       Acct: [de-identified]     PCP: Esme Murphy DO    Date of Admission: 4/29/2019    Assessment/Plan:  1) Acute Blood Loss Anemia 2/2 to GIB bleed  - Pt will be admitted to with telemetry  - H/H dropped down to 6.6/23.7 --> transfusing 2 unit of PRBC today  - Will monitor/H Q8 hrs and continue to transfuse as needed  - Hold Coumadin and ASA  - GI consulted -->   - Pt placed on PPI BID  - Plan for coloscopy tomorrow     2) DM2  - Hold oral agent  - Continue with lantus but at a lower dose (18 units. ..she takes 24 units usually)   - Continue on SSI     3) Depression  - Continue with celexa     4) HTN  - Continue with lotrel with holding parameters     5) Overactive bladder  - Continue with ditropan     6) H/X CVA with expressive aphasia/A fib  - Hold ASA and coumadin for now  - Continue with statin    7) Iron Def Anemia  - GI is treating with venofer today     Expected discharge date:  2 days    Disposition:    [] Home       [] TCU       [] Rehab       [] Psych       [] SNF       [] [place]       [x] Other- [facility]    Subjective (past 24 hours): Pt seen and examined bedside. Pt doing \"better\"  Spoke with pt's son who is going to help clarify POA and code status for out pt.      Medications:  Reviewed    Infusion Medications    sodium chloride      sodium chloride 100 mL/hr at 04/29/19 2057    dextrose       Scheduled Medications    sodium chloride  250 mL Intravenous Once    sodium chloride  250 mL Intravenous Once    iron sucrose  300 mg Intravenous Once    polyethylene glycol  238 g Oral Once    senna  15 tablet Oral Once    citalopram  10 mg Oral Daily    insulin glargine  12 Units Subcutaneous Nightly    oxybutynin  10 mg Oral Daily    simvastatin  20 mg Oral Nightly    insulin lispro  0-6 Units Subcutaneous TID WC    insulin lispro  0-3 Units Subcutaneous Nightly    sodium chloride flush  10 mL Intravenous 2 times per day    pantoprazole  40 mg Intravenous Q12H    And    sodium chloride (PF)  10 mL Intravenous Q12H    amLODIPine  10 mg Oral Daily    And    lisinopril  20 mg Oral Daily     PRN Meds: sodium chloride flush, acetaminophen, ondansetron, glucose, dextrose, glucagon (rDNA), dextrose      Intake/Output Summary (Last 24 hours) at 4/30/2019 1517  Last data filed at 4/30/2019 1425  Gross per 24 hour   Intake 2943.73 ml   Output 1800 ml   Net 1143.73 ml       Diet:  DIET CLEAR LIQUID;    Exam:  BP (!) 151/67   Pulse 78   Temp 99 °F (37.2 °C) (Oral)   Resp 20   Ht 5' 2\" (1.575 m)   Wt 229 lb (103.9 kg)   LMP  (Exact Date)   SpO2 91%   BMI 41.88 kg/m²     General appearance: No apparent distress, appears stated age and cooperative. HEENT: Pupils equal, round, and reactive to light. Conjunctivae/corneas clear. Neck: Supple, with full range of motion. No jugular venous distention. Trachea midline. Respiratory:  Normal respiratory effort. Clear to auscultation, bilaterally without Rales/Wheezes/Rhonchi. Cardiovascular: S1S2 (+), irreg irreg  Abdomen: Soft, non-tender, non-distended with normal bowel sounds. Skin: Skin color, texture, turgor normal.  No rashes or lesions.   Neurologic:  Cranial nerves: II-XII intact, aphasia noted  Psychiatric: Alert and NOD  Capillary Refill: Brisk,< 3 seconds   Peripheral Pulses: +2 palpable, equal bilaterally       Labs:   Recent Labs     04/29/19  1552 04/29/19  2347 04/30/19  0344   WBC 13.0*  --  11.2*   HGB 7.2* 6.7* 6.6*   HCT 25.4* 23.4* 23.7*   *  --  467*     Recent Labs     04/29/19  1552 04/30/19  0344    139   K 4.7 4.1    105   CO2 26 22*   BUN 21 17   CREATININE 1.2 1.0   CALCIUM 8.3* 7.9*     Recent Labs     04/29/19  1552   AST 9   ALT 8*   BILITOT <0.2*   ALKPHOS 67     Recent Labs     04/29/19  1552   INR 2.17*     No results for input(s): Fifi Lackey in the last 72 hours. Microbiology:      Urinalysis:      Lab Results   Component Value Date    NITRU NEGATIVE 09/06/2018    WBCUA 10-15 09/06/2018    WBCUA 2-4 11/29/2011    BACTERIA MODERATE 09/06/2018    RBCUA 0-2 09/06/2018    BLOODU NEGATIVE 09/06/2018    SPECGRAV 1.030 09/06/2018    GLUCOSEU 100 06/01/2016       Radiology:  XR Acute Abd Series Chest 1 VW   Final Result   1. No evidence of acute cardiopulmonary process. 2. Nonspecific, nonobstructive bowel gas pattern. **This report has been created using voice recognition software. It may contain minor errors which are inherent in voice recognition technology. **      Final report electronically signed by Dr. Nikia Dhillon MD on 4/29/2019 4:50 PM          DVT prophylaxis: [] Lovenox                                 [x] SCDs                                 [] SQ Heparin                                 [] Encourage ambulation           [] Already on Anticoagulation     Code Status: Full Code    PT/OT Eval Status:     Tele:   [x] yes             [] no    Active Hospital Problems    Diagnosis Date Noted    GIB (gastrointestinal bleeding) [K92.2] 04/29/2019       Electronically signed by Silvia Noble MD on 4/30/2019 at 3:17 PM

## 2019-04-30 NOTE — CARE COORDINATION
4/30/19, 11:47 AM    DISCHARGE BARRIERS      Spoke with [employer], who is in with patient and RN. [place] states agreement with plan to return to [facility].

## 2019-04-30 NOTE — CARE COORDINATION
4/30/19, 2:15 PM      Cachorro Mendiola       Admitted from: ED 4/29/2019/ R Bruno Gonsalez 53 day: 1   Location: [address] [place] [address] Reason for admit: GIB (gastrointestinal bleeding) [K92.2] Status: inpatient  Admit order signed?: yes  PMH:  has a past medical history of Atrial fibrillation ([address] CAD (coronary artery disease), Cerebrovascular disease, Depression, Hyperlipidemia, Hypertension, Self-care deficit for dressing and grooming, Self-care deficit for medication administration, Type II or unspecified type diabetes mellitus without mention of complication, not stated as uncontrolled, and Unspecified cerebral artery occlusion with cerebral infarction. Procedure: no  Pertinent abnormal Imaging: no  Medications:  Scheduled Meds:   sodium chloride  250 mL Intravenous Once    sodium chloride  250 mL Intravenous Once    iron sucrose  300 mg Intravenous Once    citalopram  10 mg Oral Daily    insulin glargine  12 Units Subcutaneous Nightly    oxybutynin  10 mg Oral Daily    simvastatin  20 mg Oral Nightly    insulin lispro  0-6 Units Subcutaneous TID WC    insulin lispro  0-3 Units Subcutaneous Nightly    sodium chloride flush  10 mL Intravenous 2 times per day    pantoprazole  40 mg Intravenous Q12H    And    sodium chloride (PF)  10 mL Intravenous Q12H    amLODIPine  10 mg Oral Daily    And    lisinopril  20 mg Oral Daily     Continuous Infusions:   sodium chloride 100 mL/hr at 04/29/19 2057    dextrose        Pertinent Info/Orders/Treatment Plan: GI consult. Hgb 6.6 this morning. 2 units blood today. I&O. Pain management. Telemetry monitoring. Diet: DIET CLEAR LIQUID;   Smoking status:  reports that she has never smoked.  She has never used smokeless tobacco.   PCP: Юлия Arellano DO  Readmission: no  Readmission Risk Score: 18%    Discharge Planning  Current Residence:  Nursing Home  Living Arrangements:  Spouse/Significant Other( lives at [facility] as well.)   Support Systems:  Spouse/Significant

## 2019-04-30 NOTE — CARE COORDINATION
4/30/19, 10:48 AM    DISCHARGE BARRIERS      Spoke with [facility] admissions. Confirmed Deanna Mullins is a medicaid respite resident of [facility], and can return at discharge. No family here at present.

## 2019-05-01 ENCOUNTER — ANESTHESIA (OUTPATIENT)
Dept: ENDOSCOPY | Age: 79
DRG: 394 | End: 2019-05-01
Payer: MEDICARE

## 2019-05-01 ENCOUNTER — ANESTHESIA EVENT (OUTPATIENT)
Dept: ENDOSCOPY | Age: 79
DRG: 394 | End: 2019-05-01
Payer: MEDICARE

## 2019-05-01 VITALS
DIASTOLIC BLOOD PRESSURE: 68 MMHG | SYSTOLIC BLOOD PRESSURE: 148 MMHG | OXYGEN SATURATION: 99 % | RESPIRATION RATE: 27 BRPM

## 2019-05-01 LAB
GLUCOSE BLD-MCNC: 162 MG/DL (ref 70–108)
GLUCOSE BLD-MCNC: 226 MG/DL (ref 70–108)
GLUCOSE BLD-MCNC: 295 MG/DL (ref 70–108)
HCT VFR BLD CALC: 29.8 % (ref 37–47)
HCT VFR BLD CALC: 30.5 % (ref 37–47)
HCT VFR BLD CALC: 31.1 % (ref 37–47)
HEMOCCULT STL QL: NEGATIVE
HEMOGLOBIN: 8.7 GM/DL (ref 12–16)
HEMOGLOBIN: 9 GM/DL (ref 12–16)
HEMOGLOBIN: 9.1 GM/DL (ref 12–16)
INR BLD: 1.38 (ref 0.85–1.13)

## 2019-05-01 PROCEDURE — 82948 REAGENT STRIP/BLOOD GLUCOSE: CPT

## 2019-05-01 PROCEDURE — 7100000000 HC PACU RECOVERY - FIRST 15 MIN: Performed by: INTERNAL MEDICINE

## 2019-05-01 PROCEDURE — C9113 INJ PANTOPRAZOLE SODIUM, VIA: HCPCS | Performed by: INTERNAL MEDICINE

## 2019-05-01 PROCEDURE — 85610 PROTHROMBIN TIME: CPT

## 2019-05-01 PROCEDURE — 88305 TISSUE EXAM BY PATHOLOGIST: CPT

## 2019-05-01 PROCEDURE — 7100000001 HC PACU RECOVERY - ADDTL 15 MIN: Performed by: INTERNAL MEDICINE

## 2019-05-01 PROCEDURE — 6370000000 HC RX 637 (ALT 250 FOR IP): Performed by: INTERNAL MEDICINE

## 2019-05-01 PROCEDURE — 6360000002 HC RX W HCPCS: Performed by: NURSE ANESTHETIST, CERTIFIED REGISTERED

## 2019-05-01 PROCEDURE — 2709999900 HC NON-CHARGEABLE SUPPLY: Performed by: INTERNAL MEDICINE

## 2019-05-01 PROCEDURE — 99232 SBSQ HOSP IP/OBS MODERATE 35: CPT | Performed by: INTERNAL MEDICINE

## 2019-05-01 PROCEDURE — 2500000003 HC RX 250 WO HCPCS: Performed by: INTERNAL MEDICINE

## 2019-05-01 PROCEDURE — 6360000002 HC RX W HCPCS: Performed by: INTERNAL MEDICINE

## 2019-05-01 PROCEDURE — 36415 COLL VENOUS BLD VENIPUNCTURE: CPT

## 2019-05-01 PROCEDURE — 2580000003 HC RX 258: Performed by: NURSE PRACTITIONER

## 2019-05-01 PROCEDURE — 3700000000 HC ANESTHESIA ATTENDED CARE: Performed by: INTERNAL MEDICINE

## 2019-05-01 PROCEDURE — 3609010600 HC COLONOSCOPY POLYPECTOMY SNARE/COLD BIOPSY: Performed by: INTERNAL MEDICINE

## 2019-05-01 PROCEDURE — 85018 HEMOGLOBIN: CPT

## 2019-05-01 PROCEDURE — 3700000001 HC ADD 15 MINUTES (ANESTHESIA): Performed by: INTERNAL MEDICINE

## 2019-05-01 PROCEDURE — 93010 ELECTROCARDIOGRAM REPORT: CPT | Performed by: INTERNAL MEDICINE

## 2019-05-01 PROCEDURE — 2580000003 HC RX 258: Performed by: INTERNAL MEDICINE

## 2019-05-01 PROCEDURE — 85014 HEMATOCRIT: CPT

## 2019-05-01 PROCEDURE — 2500000003 HC RX 250 WO HCPCS: Performed by: NURSE ANESTHETIST, CERTIFIED REGISTERED

## 2019-05-01 PROCEDURE — 0DBN8ZZ EXCISION OF SIGMOID COLON, VIA NATURAL OR ARTIFICIAL OPENING ENDOSCOPIC: ICD-10-PCS | Performed by: INTERNAL MEDICINE

## 2019-05-01 PROCEDURE — 1200000003 HC TELEMETRY R&B

## 2019-05-01 PROCEDURE — 82272 OCCULT BLD FECES 1-3 TESTS: CPT

## 2019-05-01 PROCEDURE — 6370000000 HC RX 637 (ALT 250 FOR IP): Performed by: FAMILY MEDICINE

## 2019-05-01 RX ORDER — PROPOFOL 10 MG/ML
INJECTION, EMULSION INTRAVENOUS PRN
Status: DISCONTINUED | OUTPATIENT
Start: 2019-05-01 | End: 2019-05-01 | Stop reason: SDUPTHER

## 2019-05-01 RX ORDER — METOPROLOL TARTRATE 5 MG/5ML
5 INJECTION INTRAVENOUS ONCE
Status: COMPLETED | OUTPATIENT
Start: 2019-05-01 | End: 2019-05-01

## 2019-05-01 RX ORDER — HYDRALAZINE HYDROCHLORIDE 25 MG/1
25 TABLET, FILM COATED ORAL EVERY 8 HOURS SCHEDULED
Status: DISCONTINUED | OUTPATIENT
Start: 2019-05-01 | End: 2019-05-03 | Stop reason: HOSPADM

## 2019-05-01 RX ORDER — LIDOCAINE HYDROCHLORIDE 20 MG/ML
INJECTION, SOLUTION INFILTRATION; PERINEURAL PRN
Status: DISCONTINUED | OUTPATIENT
Start: 2019-05-01 | End: 2019-05-01 | Stop reason: SDUPTHER

## 2019-05-01 RX ORDER — HYDRALAZINE HYDROCHLORIDE 20 MG/ML
10 INJECTION INTRAMUSCULAR; INTRAVENOUS ONCE
Status: COMPLETED | OUTPATIENT
Start: 2019-05-01 | End: 2019-05-01

## 2019-05-01 RX ADMIN — SODIUM CHLORIDE: 4.5 INJECTION, SOLUTION INTRAVENOUS at 15:40

## 2019-05-01 RX ADMIN — PROPOFOL 100 MG: 10 INJECTION, EMULSION INTRAVENOUS at 14:36

## 2019-05-01 RX ADMIN — PANTOPRAZOLE SODIUM 40 MG: 40 INJECTION, POWDER, FOR SOLUTION INTRAVENOUS at 08:36

## 2019-05-01 RX ADMIN — SODIUM CHLORIDE: 4.5 INJECTION, SOLUTION INTRAVENOUS at 05:48

## 2019-05-01 RX ADMIN — METOPROLOL TARTRATE 5 MG: 5 INJECTION, SOLUTION INTRAVENOUS at 18:11

## 2019-05-01 RX ADMIN — INSULIN LISPRO 2 UNITS: 100 INJECTION, SOLUTION INTRAVENOUS; SUBCUTANEOUS at 22:29

## 2019-05-01 RX ADMIN — Medication 10 ML: at 08:36

## 2019-05-01 RX ADMIN — HYDRALAZINE HYDROCHLORIDE 10 MG: 20 INJECTION INTRAMUSCULAR; INTRAVENOUS at 10:02

## 2019-05-01 RX ADMIN — SIMVASTATIN 20 MG: 20 TABLET, FILM COATED ORAL at 22:26

## 2019-05-01 RX ADMIN — HYDRALAZINE HYDROCHLORIDE 25 MG: 25 TABLET, FILM COATED ORAL at 22:26

## 2019-05-01 RX ADMIN — OXYBUTYNIN 10 MG: 10 TABLET, FILM COATED, EXTENDED RELEASE ORAL at 08:36

## 2019-05-01 RX ADMIN — PANTOPRAZOLE SODIUM 40 MG: 40 INJECTION, POWDER, FOR SOLUTION INTRAVENOUS at 22:26

## 2019-05-01 RX ADMIN — LISINOPRIL 20 MG: 20 TABLET ORAL at 08:35

## 2019-05-01 RX ADMIN — INSULIN LISPRO 2 UNITS: 100 INJECTION, SOLUTION INTRAVENOUS; SUBCUTANEOUS at 17:02

## 2019-05-01 RX ADMIN — INSULIN GLARGINE 18 UNITS: 100 INJECTION, SOLUTION SUBCUTANEOUS at 22:29

## 2019-05-01 RX ADMIN — AMLODIPINE BESYLATE 10 MG: 10 TABLET ORAL at 08:35

## 2019-05-01 RX ADMIN — PROPOFOL 30 MG: 10 INJECTION, EMULSION INTRAVENOUS at 14:48

## 2019-05-01 RX ADMIN — LIDOCAINE HYDROCHLORIDE 40 MG: 20 INJECTION, SOLUTION INFILTRATION; PERINEURAL at 14:36

## 2019-05-01 RX ADMIN — CITALOPRAM 10 MG: 20 TABLET, FILM COATED ORAL at 08:36

## 2019-05-01 RX ADMIN — HYDRALAZINE HYDROCHLORIDE 25 MG: 25 TABLET, FILM COATED ORAL at 12:47

## 2019-05-01 RX ADMIN — INSULIN LISPRO 1 UNITS: 100 INJECTION, SOLUTION INTRAVENOUS; SUBCUTANEOUS at 08:41

## 2019-05-01 RX ADMIN — Medication 10 ML: at 22:37

## 2019-05-01 ASSESSMENT — PAIN SCALES - GENERAL
PAINLEVEL_OUTOF10: 0

## 2019-05-01 ASSESSMENT — PAIN SCALES - WONG BAKER
WONGBAKER_NUMERICALRESPONSE: 0

## 2019-05-01 ASSESSMENT — ENCOUNTER SYMPTOMS: SHORTNESS OF BREATH: 1

## 2019-05-01 ASSESSMENT — PAIN - FUNCTIONAL ASSESSMENT: PAIN_FUNCTIONAL_ASSESSMENT: 0-10

## 2019-05-01 NOTE — CARE COORDINATION
5/1/19, 1:14 PM      [address] [facility] day: 2  Location: [facility]10/010 Reason for admit: GIB (gastrointestinal bleeding) [K92.2]       Procedure: planning colonoscopy this afternoon    Treatment Plan of Care: clear liq breakfast then NPO, bowel prep, IV Venefer, IVF, Apresoline /77 - 189/75, accu checks as ordered, PPI given IV, palliative care consulted, last Hgb 8.7/hct 29.8, stool O/B negative,       Admission: Hemoglobin found to be 6.6. Transfused 2U pRBCs on 4/30. PCP: Delisa Estrella DO     Readmission Risk Score: 18%      Discharge Plan: from [facility] and plans to return when stable. SW follows.

## 2019-05-01 NOTE — PROGRESS NOTES
1320: Updated on pt status and plan of care by [facility]. Pt is alert and responsive, with limited verbal response due to aphasia. Pt does appear to follow some of conversation, but does not ask any questions or offer comments. Pt  and daughter also at bedside. Reviewed pt status and  offers information on his health history. We review goals of care and that pt is preparing for colonoscopy soon. We also review pt/ needs at home and concerns for safety.  states both are at [address] [facility] at this time and they are looking into assisted living.  does offer some concern stating \"if I'm not home then no one is there to take care of her. \"  We review code status options and benefits and risks of each level of care. Information given to  at his request on code status options. Pt  states they do not have LW or DPOA. Encourage pt/ to consider completing and also share with their children on what their wishes are. Daughter present during conversation does not offer any input or have questions. Emotional support given.  Will follow supportively

## 2019-05-01 NOTE — ANESTHESIA POSTPROCEDURE EVALUATION
Department of Anesthesiology  Postprocedure Note    Patient: Nandini Hopkins  MRN: [id number]  YOB: 1940  Date of evaluation: 5/1/2019  Time:  3:09 PM     Procedure Summary     Date:  05/01/19 Room / Location:  [facility] / [facility]    Anesthesia Start:  1571 Anesthesia Stop:  1509    Procedure:  COLONOSCOPY POLYPECTOMY SNARE/COLD BIOPSY (N/A ) Diagnosis:  (GI Bleed)    Surgeon:  Carmine Johnson MD Responsible Provider:  Dylan Walls MD    Anesthesia Type:  MAC ASA Status:  3          Anesthesia Type: MAC    Michelle Phase I:      Michelle Phase II:      Last vitals: Reviewed and per EMR flowsheets.        Anesthesia Post Evaluation    Patient location during evaluation: bedside  Patient participation: complete - patient participated  Level of consciousness: awake and alert  Pain score: 0  Airway patency: patent  Nausea & Vomiting: no nausea and no vomiting  Complications: no  Cardiovascular status: hemodynamically stable  Respiratory status: acceptable and spontaneous ventilation  Hydration status: euvolemic

## 2019-05-01 NOTE — FLOWSHEET NOTE
05/01/19 1545   Patient Goal of the Day(Whiteboard)   Patient Daily Goal reviewed with Patient; Family member   Patient's reason for admission GI Bleed   Precautions   Precautions Fall risk   Negative Pressure Room No   Positive Pressure Room No   Safe Environment   Arm Bands On ID; Allergies; Fall   Call Light Within Reach Yes   Overbed Table Within Reach Yes   Bed In Lowest Position Yes   Bed Wheels Locked Yes   Siderails Up 2/4   NonSkid Footwear Patient in bed   Fall Risk Interventions   Toilet Every 2 Hours-In Advance of Need Yes  (external catheter)   Hourly Visual Checks Awake; In bed   Fall Visual Posted Armband; Fall sign posted   Room Door Open Yes   Alarm On Bed   Mobility   Activity In bed   Repositioned Semi fowlers;Pillow support;Lying left side   Head of Bed Elevated  HOB 30   Heels/Feet Foot of bed elevated; Heel(s) elevated off bed   Range of Motion Active; Left arm;Left leg;Passive;Right arm;Right leg   Patient Arrival To Unit 1545   Transport Method Bed   Anti-Embolism Devices Bilateral;Pneumatic compression devices, below knee   Anti-Embolism Intervention On   Pain Assessment   Pain Assessment 0-10   Pain Level 0   Mitchell-Baker Pain Rating 0   Patient's Stated Pain Goal No pain       Pt arrived in 7K 10 from Endoscopy via stretcher. Complaints: None, denies pain. IV normal saline infusing into the hand left, condition patent and no redness at a rate of 75 mls/ hour with about 700 mls remaining in the bag. Patient has call light in reach.

## 2019-05-01 NOTE — H&P
Jannet  Sedation/Analgesia History & Physical    Patient: Clemente Elena : 1940  [facility] Rec#: [id number] Acc#: [id number]   Provider Performing Procedure: Hernan Holman MD  Primary Care Physician: Delisa Estrella DO    PRE-PROCEDURE   Brief History/Pre-Procedure Diagnosis:The patient is a 70 y.o.,  female with significant past medical history of hematochezia and iron deficiency anemia. MEDICAL HISTORY    []Additional information:       has a past medical history of Atrial fibrillation ([address] CAD (coronary artery disease), Cerebrovascular disease, Depression, Hyperlipidemia, Hypertension, Self-care deficit for dressing and grooming, Self-care deficit for medication administration, Type II or unspecified type diabetes mellitus without mention of complication, not stated as uncontrolled, and Unspecified cerebral artery occlusion with cerebral infarction. SURGICAL HISTORY   has a past surgical history that includes joint replacement (); joint replacement (); Cholecystectomy (); Cardiac catheterization (); Hemorrhoid surgery (); and Colonoscopy.   Additional information:       ALLERGIES   Allergies as of 2019 - Review Complete 2019   Allergen Reaction Noted    Papaya derivatives Hives 10/12/2011    Pcn [penicillins] Hives 10/12/2011     Additional information:       MEDICATIONS       Current Facility-Administered Medications:     hydrALAZINE (APRESOLINE) tablet 25 mg, 25 mg, Oral, 3 times per day, Husam Esquivel MD, 25 mg at 19 1247    0.45 % sodium chloride infusion, , Intravenous, Continuous, AYAKA Pratt CNP, Last Rate: 75 mL/hr at 19 0548    insulin glargine (LANTUS) injection vial 18 Units, 18 Units, Subcutaneous, Nightly, Ehsan Cunningham MD, 18 Units at 19 2242    citalopram (CELEXA) tablet 10 mg, 10 mg, Oral, Daily, Ehsan Cunningham MD, 10 mg at 19 0836    oxybutynin (DITROPAN-XL) extended release tablet 10 mg, 10 mg, Oral, Daily, Yvonne Melo MD, 10 mg at 05/01/19 0836    simvastatin (ZOCOR) tablet 20 mg, 20 mg, Oral, Nightly, Yvonne Melo MD, 20 mg at 04/30/19 2046    insulin lispro (HUMALOG) injection vial 0-6 Units, 0-6 Units, Subcutaneous, TID WC, Yvonne Melo MD, 1 Units at 05/01/19 0841    insulin lispro (HUMALOG) injection vial 0-3 Units, 0-3 Units, Subcutaneous, Nightly, Yvonne Melo MD, 1 Units at 04/29/19 2057    sodium chloride flush 0.9 % injection 10 mL, 10 mL, Intravenous, 2 times per day, Aden Lea, DO, 10 mL at 04/30/19 0843    sodium chloride flush 0.9 % injection 10 mL, 10 mL, Intravenous, PRN, Aden Lea, DO    acetaminophen (TYLENOL) tablet 650 mg, 650 mg, Oral, Q4H PRN, Aden Lea, DO    ondansetron [facility]) injection 4 mg, 4 mg, Intravenous, Q6H PRN, Aden Jose, DO    pantoprazole (PROTONIX) injection 40 mg, 40 mg, Intravenous, Q12H, 40 mg at 05/01/19 0836 **AND** sodium chloride (PF) 0.9 % injection 10 mL, 10 mL, Intravenous, Q12H, Aden Lea, DO, 10 mL at 05/01/19 0836    glucose (GLUTOSE) 40 % oral gel 15 g, 15 g, Oral, PRN, Aden Jose, DO    dextrose 50 % solution 12.5 g, 12.5 g, Intravenous, PRN, Aden Lea, DO    glucagon (rDNA) injection 1 mg, 1 mg, Intramuscular, PRN, Aden Lea, DO    dextrose 5 % solution, 100 mL/hr, Intravenous, PRN, Aden Lea, DO    amLODIPine (NORVASC) tablet 10 mg, 10 mg, Oral, Daily, 10 mg at 05/01/19 0835 **AND** lisinopril (PRINIVIL;ZESTRIL) tablet 20 mg, 20 mg, Oral, Daily, Aden Lea, DO, 20 mg at 05/01/19 3811  Prior to Admission medications    Medication Sig Start Date End Date Taking?  Authorizing Provider   docusate sodium (COLACE) 100 MG capsule Take 100 mg by mouth daily   Yes Historical Provider, MD   insulin regular (HUMULIN R;NOVOLIN R) 100 UNIT/ML injection Inject into the skin 4 times daily (before meals and nightly) 151-200= 2 units, 201-250= 4 units, 251-300= 6 units, 301-350= 8 units, 351-400= 10 Take 400 Units by mouth daily    Historical Provider, MD   oxybutynin (DITROPAN XL) 10 MG CR tablet Take 1 tablet by mouth daily. 1/10/13 1/23/14  Marck Smith MD   Glucagon HCl, rDNA, (GLUCAGEN HYPOKIT IJ) Inject 1 mg as directed as needed. Indications: Extremely Low Blood Sugar    Historical Provider, MD   glucose blood VI test strips (ONE TOUCH TEST STRIPS) strip Test blood sugar with One Touch Ultra 2 QID and as needed. 11/2/11   Marck Smith MD   Insulin Pen Needle (PEN NEEDLES) 29G X 12MM MISC by Does not apply route daily. Historical Provider, MD     Additional information:       PHYSICAL:    height is 5' 2\" (1.575 m) and weight is 229 lb (103.9 kg). Her oral temperature is 98.5 °F (36.9 °C). Her blood pressure is 178/74 (abnormal) and her pulse is 84. Her respiration is 16 and oxygen saturation is 90%. Heart:  [x]Regular rate and rhythm  []Other:    Lungs:  [x]Clear    []Other:    Abdomen: [x]Soft    []Other:    Mental Status: [x]Alert & Oriented  []Other:      VITAL SIGNS   Patient Vitals for the past 24 hrs:   BP Temp Temp src Pulse Resp SpO2   05/01/19 1423 (!) 178/74 -- -- 84 16 90 %   05/01/19 1247 (!) 189/75 -- -- 81 -- --   05/01/19 1130 (!) 176/64 98.5 °F (36.9 °C) Oral 83 18 92 %   05/01/19 1025 (!) 176/72 -- -- 79 -- --   05/01/19 1002 (!) 192/77 -- -- 76 -- --   05/01/19 0835 (!) 185/83 98.3 °F (36.8 °C) Oral 79 18 90 %   05/01/19 0430 (!) 161/70 98.1 °F (36.7 °C) Oral 82 20 92 %   05/01/19 0030 (!) 140/60 98.1 °F (36.7 °C) Oral 83 19 92 %   04/30/19 2040 (!) 142/80 98 °F (36.7 °C) Oral 81 19 92 %       PLANNED PROCEDURE   []EGD  [x]Colonoscopy []Flex Sigmoid  []ERCP []EUS   []Cystoscopy  [] CATH [] BRONCH   Consent: I have discussed with the patient and/or the patient representative the indication, alternatives, and the possible risks and/or complications of the planned procedure and the anesthesia methods.  The patient and/or patient representative appear to understand and agree to proceed. SEDATION ( TIVA ANESTHESIA, SEE ANESTHESIA NOTE FOR DETAILS)      ASA Classification: Class 3 - A patient with severe systemic disease that limits activity but is not incapacitating    Airway Assessment: normal    Monitoring and Safety: The patient will be placed on a cardiac monitor and vital signs, pulse oximetry and level of consciousness will be continuously evaluated throughout the procedure. The patient will be closely monitored until recovery from the medications is complete and the patient has returned to baseline status. Respiratory therapy will be on standby during the procedure. [x]Pre-procedure diagnostic studies complete and results available. Comment:    [x]Previous sedation/anesthesia experiences assessed. Comment:    [x]The patient is an appropriate candidate to undergo the planned procedure sedation and anesthesia. (Refer to nursing sedation/analgesia documentation record)  [x]Formulation and discussion of sedation/procedure plan, risks, and expectations with patient and/or responsible adult completed. [x]Patient examined immediately prior to the procedure.  (Refer to nursing sedation/analgesia documentation record)    Adeel Camacho MD   Electronically signed 5/1/2019 at 2:31 PM

## 2019-05-01 NOTE — PLAN OF CARE
catheter. Denies any burning upon urination. Problem: Nutrition  Goal: Optimal nutrition therapy  Outcome: Ongoing  Note:   Patient is taking in adequate clear liquids at this time. Patient denies any nausea at this time. Problem: DAILY CARE  Goal: Daily care needs are met  Outcome: Ongoing  Note:   Daily needs met at this time. Problem: DISCHARGE BARRIERS  Goal: Patient's continuum of care needs are met  Outcome: Ongoing  Note:   Patient and family plan to return to [facility] at discharge.  assisting with discharge planning. Care plan reviewed with patient and daughter. Patient and daughter verbalize understanding of the plan of care and contribute to goal setting.

## 2019-05-01 NOTE — PROGRESS NOTES
Hospitalist Progress Note    Patient:  Nandini Hopkins      Unit/Bed:7K-10/010-A    YOB: 1940    MRN: [id number]       Acct: [de-identified]     PCP: Nori Valles DO    Date of Admission: 4/29/2019    Chief Complaint: low hemoglobin at nursing home with bloody stools     Hospital Course: 70 y.o. female who presented to [address] [place] [address] from her nursing home 2/2 to having a low Hgb today. She is a very poor historian and can not obtain any information from her. Per nurse, pt had a low hemoglobin of 6.8 at her nursing home and some bloody stools at her nursing home and was sent to the ED for evaluation. Pt has pmh of CVA and Atrial Fib on ASA and coumadin. Hemoglobin found to be 6.6. Transfused 2U pRBCs on 4/30. Fe and Ferritin found to be extremely low (18 and 3). Given IV Fe. Plan for colonoscopy today. Subjective: no acute events overnight. No black or BRBPR. No dizziness, chest pain or sob.       Medications:  Reviewed    Infusion Medications    sodium chloride 75 mL/hr at 05/01/19 0548    dextrose       Scheduled Medications    insulin glargine  18 Units Subcutaneous Nightly    citalopram  10 mg Oral Daily    oxybutynin  10 mg Oral Daily    simvastatin  20 mg Oral Nightly    insulin lispro  0-6 Units Subcutaneous TID WC    insulin lispro  0-3 Units Subcutaneous Nightly    sodium chloride flush  10 mL Intravenous 2 times per day    pantoprazole  40 mg Intravenous Q12H    And    sodium chloride (PF)  10 mL Intravenous Q12H    amLODIPine  10 mg Oral Daily    And    lisinopril  20 mg Oral Daily     PRN Meds: sodium chloride flush, acetaminophen, ondansetron, glucose, dextrose, glucagon (rDNA), dextrose      Intake/Output Summary (Last 24 hours) at 5/1/2019 0847  Last data filed at 5/1/2019 0835  Gross per 24 hour   Intake 3364.73 ml   Output 2950 ml   Net 414.73 ml       Diet:  DIET CLEAR LIQUID;    Exam:  BP (!) 185/83   Pulse 82   Temp 98.1 °F (36.7 °C) (Oral) GLUCOSEU 100 06/01/2016       Radiology:  XR Acute Abd Series Chest 1 VW   Final Result   1. No evidence of acute cardiopulmonary process. 2. Nonspecific, nonobstructive bowel gas pattern. **This report has been created using voice recognition software. It may contain minor errors which are inherent in voice recognition technology. **      Final report electronically signed by Dr. Alen Chung MD on 4/29/2019 4:50 PM          Diet: DIET CLEAR LIQUID;    DVT prophylaxis: [] Lovenox                                 [x] SCDs                                 [] SQ Heparin                                 [] Encourage ambulation           [] Already on Anticoagulation     Disposition:    [] Home       [] TCU       [] Rehab       [] Psych       [x] SNF       [] [place]       [] Other-    Code Status: Full Code    PT/OT Eval Status: ECF     Assessment/Plan:    Anticipated Discharge in : TBD     Active Hospital Problems    Diagnosis Date Noted    GIB (gastrointestinal bleeding) [K92.2] 04/29/2019          Assessment/Plan:    1) Acute Blood Loss Anemia 2/2 to GIB bleed  - Pt will be admitted to with telemetry  - H/H dropped down to 6.6/23.7 --> transfused 2U on 4/30.   - Will monitor/H Q8 hrs and continue to transfuse as needed  - Hold Coumadin and ASA  - GI consulted --> plan for colonoscopy today   - Pt placed on PPI BID     2) DM2  - Hold oral agent  - Continue with lantus but at a lower dose (18 units. ..she takes 24 units usually)   - Continue on SSI     3) Depression  - Continue with celexa     4) HTN  - Continue with lotrel with holding parameters     5) Overactive bladder  - Continue with ditropan     6) H/X CVA with expressive aphasia/A fib  - Hold ASA and coumadin for now  - Continue with statin     7) Iron Def Anemia  - Fe 18 and Ferritin 3  - GI is treated with IV Venofir on 4/30   - will need to be on Ferrous Sulfate 325 BID             Electronically signed by Ashok Taylor Cristal Sherwood MD on 5/1/2019 at 8:47 AM

## 2019-05-01 NOTE — PLAN OF CARE
side).  Does have verbal deficit. 5/1/2019 0825 by Nina Yuan RN  Outcome: Ongoing  Note:   Patient is alert to name, unable to verbalize situation, time, and place due to deficits from CVA. Able to make needs known with her words and pointing to what she needs. Problem: Cardiovascular  Goal: No DVT, peripheral vascular complications  6/9/2235 4840 by Marie Coyle RN  Outcome: Ongoing  Note:   Patient denies pain to bilateral calves, no redness, nor warmth noted. Patient is compliant with SCDs. 5/1/2019 0825 by Nina Yuan RN  Outcome: Ongoing  Note:   Patient without s/s of DVT. Patient able to wear SCDs to help prevent development of DVT as coumadin is on hold at this time. Problem: Respiratory  Goal: No pulmonary complications  7/5/0539 6740 by Marie Coyle RN  Outcome: Ongoing  Note:   Patient is breathing regular and unlabored, lung sounds are diminished throughout, denies SOB, SpO2 remains above 89% on 2L NC O2.    5/1/2019 0825 by Nina Yuan RN  Outcome: Ongoing  Note:   Patient is 92% on 2L per nasal cannula. Lungs are clear and diminished. Patient denies any shortness of breath at this time. Problem: GI  Goal: No bowel complications  0/5/8280 0270 by Marie Coyle RN  Outcome: Ongoing  Note:   Patient denies n/v/d, no distention noted, bowel sounds are active x 4. Patient states is passing flatus. 5/1/2019 0825 by Nina Yuan RN  Outcome: Ongoing  Note:   Patient with bowel sounds, passing flatus, and without nausea. Bowel movements noted during shift from bowel prep given for colonoscopy. Problem:   Goal: Adequate urinary output  5/1/2019 0940 by Marie Coyle RN  Outcome: Ongoing  Note:   Patient voiding adequate amounts of christie cloudy urine, patient is not continent, has an external catheter in place at this time. 5/1/2019 0825 by Nina Yuan RN  Outcome: Ongoing  Note:   Patient has adequate urinary output from external catheter. Denies any burning upon urination. Problem: Nutrition  Goal: Optimal nutrition therapy  5/1/2019 0940 by Rosaura Kearney RN  Outcome: Ongoing  Note:   Patient verbalizes appropriate appetite, denies n/v. Able to consume food and drink with no issues noted. Patient is on a clear liquid diet. Patient will be NPO after 10:30 am.   5/1/2019 0825 by Dinh Gamboa RN  Outcome: Ongoing  Note:   Patient is taking in adequate clear liquids at this time. Patient denies any nausea at this time. Problem: DAILY CARE  Goal: Daily care needs are met  5/1/2019 0940 by Rosaura Kearney RN  Outcome: Ongoing  Note:   Patient's daily care needs continue to be met. 5/1/2019 0825 by Dinh Gamboa RN  Outcome: Ongoing  Note:   Daily needs met at this time. Problem: Discharge Planning:  Goal: Patients continuum of care needs are met  Description  Patients continuum of care needs are met  Outcome: Ongoing  Note:   Patient's continuum of care needs continue to be met. Care plan reviewed with patient. Patient does verbalize understanding of the plan of care and contribute to goal setting.

## 2019-05-01 NOTE — ANESTHESIA PRE PROCEDURE
Department of Anesthesiology  Preprocedure Note       Name:  Alberta Wilcox   Age:  70 y.o.  :  1940                                          MRN:  [id number]         Date:  2019      Surgeon: Kayla Westbrook):  Melquiades Milligan MD    Procedure: COLONOSCOPY DIAGNOSTIC (N/A )    Medications prior to admission:   Prior to Admission medications    Medication Sig Start Date End Date Taking?  Authorizing Provider   docusate sodium (COLACE) 100 MG capsule Take 100 mg by mouth daily   Yes Historical Provider, MD   insulin regular (HUMULIN R;NOVOLIN R) 100 UNIT/ML injection Inject into the skin 4 times daily (before meals and nightly) 151-200= 2 units, 201-250= 4 units, 251-300= 6 units, 301-350= 8 units, 351-400= 10 units, 401+= call MD   Yes Historical Provider, MD   LANTUS SOLOSTAR 100 UNIT/ML injection pen INJECT 25 UNITS SUBCUTANEOUSLY NIGHTLY  Patient taking differently: INJECT 36 UNITS SUBCUTANEOUSLY NIGHTLY 19  Yes Altagracia Chauhan MD   metFORMIN (GLUCOPHAGE) 500 MG tablet TAKE ONE TABLET BY MOUTH ONCE DAILY WITH BREAKFAST 18  Yes Altagracia Chauhan MD   lisinopril (PRINIVIL;ZESTRIL) 40 MG tablet TAKE ONE TABLET BY MOUTH ONCE DAILY 18  Yes Altagracia Chauhan MD   simvastatin (ZOCOR) 20 MG tablet TAKE ONE TABLET BY MOUTH ONCE DAILY AT  NIGHT 18  Yes Altagracia Chauhan MD   oxybutynin (DITROPAN-XL) 10 MG extended release tablet TAKE ONE TABLET BY MOUTH ONCE DAILY 18  Yes Altagracia Chauhan MD   pantoprazole (PROTONIX) 40 MG tablet TAKE ONE TABLET BY MOUTH ONCE DAILY 18  Yes Altagracia Chauhan MD   citalopram (CELEXA) 10 MG tablet TAKE ONE TABLET BY MOUTH ONCE DAILY 18  Yes Altagracia Chauhan MD   amLODIPine-benazepril (LOTREL) 10-20 MG per capsule TAKE ONE CAPSULE BY MOUTH ONCE DAILY FOR HIGH  BLOOD PRESSURE 10/31/18  Yes Altagracia Chauhan MD   warfarin (COUMADIN) 6 MG tablet Take as directed by [facility] 30 tabs = 30 days  Patient taking differently: 5.5 mg Take as directed by [address] 30 tabs = 30 days 10/25/18  Yes Mame Good MD   Ascorbic Acid (VITAMIN C PO) Take 1 tablet by mouth three times a week    Yes Historical Provider, MD   calcium carbonate (OSCAL) 500 MG TABS tablet Take 500 mg by mouth Three times weekly   Yes Historical Provider, MD   aspirin EC 81 MG EC tablet Take 81 mg by mouth daily. Indications: Cardiovascular Risk Reduction   Yes Historical Provider, MD   JANUVIA 100 MG tablet TAKE ONE TABLET BY MOUTH ONCE DAILY 11/26/18   Merrill Gutiérrez MD   glimepiride (AMARYL) 4 MG tablet Take 2 tablets by mouth daily 7/3/18   Merrill Gutiérrez MD   RELION SHORT PEN NEEDLES 31G X 8 MM MISC USE TO INJECT ONCE DAILY SUBCUTANEOUSLY 2/6/18   Merrill Gutiérrez MD   vitamin E 400 UNIT capsule Take 400 Units by mouth daily    Historical Provider, MD   oxybutynin (DITROPAN XL) 10 MG CR tablet Take 1 tablet by mouth daily. 1/10/13 1/23/14  Merrill Gutiérrez MD   Glucagon HCl, rDNA, (GLUCAGEN HYPOKIT IJ) Inject 1 mg as directed as needed. Indications: Extremely Low Blood Sugar    Historical Provider, MD   glucose blood VI test strips (ONE TOUCH TEST STRIPS) strip Test blood sugar with One Touch Ultra 2 QID and as needed. 11/2/11   Merrill Gutiérrez MD   Insulin Pen Needle (PEN NEEDLES) 29G X 12MM MISC by Does not apply route daily.     Historical Provider, MD       Current medications:    Current Facility-Administered Medications   Medication Dose Route Frequency Provider Last Rate Last Dose    hydrALAZINE (APRESOLINE) tablet 25 mg  25 mg Oral 3 times per day Darron Miranda MD   25 mg at 05/01/19 1247    0.45 % sodium chloride infusion   Intravenous Continuous AYAKA Arcos CNP 75 mL/hr at 05/01/19 0548      insulin glargine (LANTUS) injection vial 18 Units  18 Units Subcutaneous Nightly Shannan Paige MD   18 Units at 04/30/19 2242    citalopram (CELEXA) tablet 10 mg  10 mg Oral Daily Shannan Paige MD   10 mg at 05/01/19 0836    oxybutynin (DITROPAN-XL) extended release tablet 10 mg  10 mg Oral  S/P stroke due to cerebrovascular disease Z86.73    Hemiplegia affecting right dominant side ([facility]) G81.91    Aphasia as late effect of cerebrovascular accident I69.5    OAB (overactive bladder) N32.81    Urinary incontinence I22    Folliculitis, lt face.  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    Sepsis due to urinary tract infection ([facility]) A41.9, N39.0    Delirium R41.0    Diarrhea in adult patient R19.7    Skin neoplasm D49.2    Depression F32.9    Abdominal pain R10.9    Leukocytosis D72.829    Pleural effusion J90    History of stroke Z86.73    Anemia D64.9    Ileus ([facility]) K56.7    Iron deficiency anemia D50.9    Medication monitoring encounter Z51.81    Hemarthrosis of left knee M25.062    Nausea and vomiting R11.2    Diabetes mellitus type 2 in obese ([facility]) E11.69, E66.9    Leukocytosis D72.829    Ambulatory dysfunction R26.2    Bleeding on Coumadin R58, T45.515A    CVA, old, aphasia I69.5    Recurrent major depressive disorder, in partial remission ([facility]) F33.41    BCC (basal cell carcinoma), arm, left C44.619    Spastic hemiplegia of right dominant side due to nontraumatic intraparenchymal hemorrhage of brain ([facility]) I61.9, G81.11    SBO (small bowel obstruction) ([facility] [address] K56.609    DENIS (dyspnea on exertion) R06.09    GIB (gastrointestinal bleeding) K92.2       Past Medical History:        Diagnosis Date    Atrial fibrillation ([facility] [address]     CAD (coronary artery disease)     Cerebrovascular disease     Depression     Hyperlipidemia     Hypertension     Self-care deficit for dressing and grooming     Self-care deficit for medication administration 2010    Type II or unspecified type diabetes mellitus without mention of complication, not stated as uncontrolled 2010    Unspecified cerebral artery occlusion with cerebral infarction        Past Surgical History:        Procedure Laterality Date    CARDIAC CATHETERIZATION  2004   [address]    COLONOSCOPY      HEMORRHOID SURGERY  2001    JOINT REPLACEMENT  2008    Lt Total Knee    JOINT REPLACEMENT  2009    Rt Total Knee       Social History:    Social History     Tobacco Use    Smoking status: Never Smoker    Smokeless tobacco: Never Used   Substance Use Topics    Alcohol use: No                                Counseling given: Not Answered      Vital Signs (Current):   Vitals:    05/01/19 1002 05/01/19 1025 05/01/19 1130 05/01/19 1247   BP: (!) 192/77 (!) 176/72 (!) 176/64 (!) 189/75   Pulse: 76 79 83 81   Resp:   18    Temp:   36.9 °C (98.5 °F)    TempSrc:   Oral    SpO2:   92%    Weight:       Height:                                                  BP Readings from Last 3 Encounters:   05/01/19 (!) 189/75   02/05/19 130/62   11/20/18 (!) 132/90       NPO Status:                                                                                 BMI:   Wt Readings from Last 3 Encounters:   04/29/19 229 lb (103.9 kg)   02/05/19 218 lb 4.8 oz (99 kg)   11/01/18 216 lb 14.4 oz (98.4 kg)     Body mass index is 41.88 kg/m².     CBC:   Lab Results   Component Value Date    WBC 11.2 04/30/2019    RBC 3.54 04/30/2019    RBC 4.59 12/01/2011    HGB 8.7 05/01/2019    HCT 29.8 05/01/2019    MCV 66.9 04/30/2019    RDW 16.5 12/13/2017     04/30/2019       CMP:   Lab Results   Component Value Date     04/30/2019    K 4.1 04/30/2019     04/30/2019    CO2 22 04/30/2019    BUN 17 04/30/2019    CREATININE 1.0 04/30/2019    LABGLOM 54 04/30/2019    GLUCOSE 170 04/30/2019    GLUCOSE 164 12/03/2011    PROT 6.9 04/29/2019    CALCIUM 7.9 04/30/2019    BILITOT <0.2 04/29/2019    ALKPHOS 67 04/29/2019    AST 9 04/29/2019    ALT 8 04/29/2019       POC Tests:   Recent Labs     05/01/19  0547   POCGLU 162*       Coags:   Lab Results   Component Value Date    PROTIME 18.6 10/05/2018    INR 1.38 05/01/2019    APTT 36.2 04/29/2019       HCG (If Applicable): No results found for: PREGTESTUR, PREGSERUM, HCG, HCGQUANT     ABGs: No results found

## 2019-05-01 NOTE — PROGRESS NOTES
Post-procedure recovery period initiated. Patient drowsy but arouses to verbal stimuli. Denies pain, nausea. Not passing flatus. Abdomen soft, non-tender.

## 2019-05-01 NOTE — PROGRESS NOTES
Photos taken,polyp removed by cold snare, specimen labeled & 1 jar sent to lab. , colonoscopy completed, pt tolerated well.

## 2019-05-02 LAB
ANION GAP SERPL CALCULATED.3IONS-SCNC: 8 MEQ/L (ref 8–16)
BUN BLDV-MCNC: 12 MG/DL (ref 7–22)
CALCIUM SERPL-MCNC: 8.2 MG/DL (ref 8.5–10.5)
CHLORIDE BLD-SCNC: 107 MEQ/L (ref 98–111)
CO2: 25 MEQ/L (ref 23–33)
CREAT SERPL-MCNC: 0.9 MG/DL (ref 0.4–1.2)
GFR SERPL CREATININE-BSD FRML MDRD: 61 ML/MIN/1.73M2
GLUCOSE BLD-MCNC: 179 MG/DL (ref 70–108)
GLUCOSE BLD-MCNC: 185 MG/DL (ref 70–108)
GLUCOSE BLD-MCNC: 250 MG/DL (ref 70–108)
GLUCOSE BLD-MCNC: 268 MG/DL (ref 70–108)
GLUCOSE BLD-MCNC: 269 MG/DL (ref 70–108)
GLUCOSE BLD-MCNC: 285 MG/DL (ref 70–108)
HCT VFR BLD CALC: 29.1 % (ref 37–47)
HCT VFR BLD CALC: 31.5 % (ref 37–47)
HEMOGLOBIN: 8.6 GM/DL (ref 12–16)
HEMOGLOBIN: 8.9 GM/DL (ref 12–16)
INR BLD: 1.24 (ref 0.85–1.13)
POTASSIUM SERPL-SCNC: 4.3 MEQ/L (ref 3.5–5.2)
SODIUM BLD-SCNC: 140 MEQ/L (ref 135–145)

## 2019-05-02 PROCEDURE — 82948 REAGENT STRIP/BLOOD GLUCOSE: CPT

## 2019-05-02 PROCEDURE — 85014 HEMATOCRIT: CPT

## 2019-05-02 PROCEDURE — 36415 COLL VENOUS BLD VENIPUNCTURE: CPT

## 2019-05-02 PROCEDURE — 99232 SBSQ HOSP IP/OBS MODERATE 35: CPT | Performed by: INTERNAL MEDICINE

## 2019-05-02 PROCEDURE — 1200000003 HC TELEMETRY R&B

## 2019-05-02 PROCEDURE — 2580000003 HC RX 258: Performed by: INTERNAL MEDICINE

## 2019-05-02 PROCEDURE — 85018 HEMOGLOBIN: CPT

## 2019-05-02 PROCEDURE — 6370000000 HC RX 637 (ALT 250 FOR IP): Performed by: FAMILY MEDICINE

## 2019-05-02 PROCEDURE — 85610 PROTHROMBIN TIME: CPT

## 2019-05-02 PROCEDURE — 6370000000 HC RX 637 (ALT 250 FOR IP): Performed by: INTERNAL MEDICINE

## 2019-05-02 PROCEDURE — 80048 BASIC METABOLIC PNL TOTAL CA: CPT

## 2019-05-02 RX ORDER — FERROUS SULFATE 325(65) MG
325 TABLET ORAL 2 TIMES DAILY WITH MEALS
Status: DISCONTINUED | OUTPATIENT
Start: 2019-05-02 | End: 2019-05-03 | Stop reason: HOSPADM

## 2019-05-02 RX ORDER — HYDROCORTISONE ACETATE 25 MG/1
25 SUPPOSITORY RECTAL 2 TIMES DAILY
Status: DISCONTINUED | OUTPATIENT
Start: 2019-05-02 | End: 2019-05-03 | Stop reason: HOSPADM

## 2019-05-02 RX ORDER — WARFARIN SODIUM 5 MG/1
5 TABLET ORAL DAILY
Status: DISCONTINUED | OUTPATIENT
Start: 2019-05-02 | End: 2019-05-02 | Stop reason: ALTCHOICE

## 2019-05-02 RX ADMIN — Medication 10 ML: at 08:51

## 2019-05-02 RX ADMIN — LISINOPRIL 20 MG: 20 TABLET ORAL at 08:51

## 2019-05-02 RX ADMIN — FERROUS SULFATE TAB 325 MG (65 MG ELEMENTAL FE) 325 MG: 325 (65 FE) TAB at 17:19

## 2019-05-02 RX ADMIN — Medication 5.5 MG: at 17:18

## 2019-05-02 RX ADMIN — OXYBUTYNIN 10 MG: 10 TABLET, FILM COATED, EXTENDED RELEASE ORAL at 08:51

## 2019-05-02 RX ADMIN — SIMVASTATIN 20 MG: 20 TABLET, FILM COATED ORAL at 21:08

## 2019-05-02 RX ADMIN — HYDRALAZINE HYDROCHLORIDE 25 MG: 25 TABLET, FILM COATED ORAL at 21:08

## 2019-05-02 RX ADMIN — HYDRALAZINE HYDROCHLORIDE 25 MG: 25 TABLET, FILM COATED ORAL at 17:20

## 2019-05-02 RX ADMIN — HYDROCORTISONE ACETATE 25 MG: 25 SUPPOSITORY RECTAL at 09:00

## 2019-05-02 RX ADMIN — INSULIN LISPRO 1 UNITS: 100 INJECTION, SOLUTION INTRAVENOUS; SUBCUTANEOUS at 08:54

## 2019-05-02 RX ADMIN — INSULIN LISPRO 2 UNITS: 100 INJECTION, SOLUTION INTRAVENOUS; SUBCUTANEOUS at 21:08

## 2019-05-02 RX ADMIN — Medication 10 ML: at 21:10

## 2019-05-02 RX ADMIN — INSULIN LISPRO 3 UNITS: 100 INJECTION, SOLUTION INTRAVENOUS; SUBCUTANEOUS at 17:21

## 2019-05-02 RX ADMIN — HYDRALAZINE HYDROCHLORIDE 25 MG: 25 TABLET, FILM COATED ORAL at 06:04

## 2019-05-02 RX ADMIN — INSULIN GLARGINE 18 UNITS: 100 INJECTION, SOLUTION SUBCUTANEOUS at 21:08

## 2019-05-02 RX ADMIN — AMLODIPINE BESYLATE 10 MG: 10 TABLET ORAL at 08:51

## 2019-05-02 RX ADMIN — CITALOPRAM 10 MG: 20 TABLET, FILM COATED ORAL at 08:51

## 2019-05-02 RX ADMIN — FERROUS SULFATE TAB 325 MG (65 MG ELEMENTAL FE) 325 MG: 325 (65 FE) TAB at 10:00

## 2019-05-02 ASSESSMENT — PAIN SCALES - GENERAL
PAINLEVEL_OUTOF10: 0

## 2019-05-02 NOTE — PROGRESS NOTES
Clinical Pharmacy Note    Chayito Simmons is a 70 y.o. female for whom pharmacy has been asked to manage warfarin therapy. Reason for Admission: GIB    Consulting Physician: Dr Anu Rodriguez  Warfarin dose prior to admission: 5.5mg daily  Warfarin indication: hx CVA  Target INR range: 2-3   Outpatient warfarin provider: Marcos GALINDO    Past Medical History:   Diagnosis Date    Atrial fibrillation ([address]     CAD (coronary artery disease)     Cerebrovascular disease     Depression     Hyperlipidemia     Hypertension     Self-care deficit for dressing and grooming     Self-care deficit for medication administration 2010    Type II or unspecified type diabetes mellitus without mention of complication, not stated as uncontrolled 2010    Unspecified cerebral artery occlusion with cerebral infarction                 Recent Labs     05/02/19  0834   INR 1.24*     Recent Labs     04/29/19  1552  04/30/19  0344  05/01/19  0729 05/01/19  1958 05/02/19  0719   HGB 7.2*   < > 6.6*   < > 8.7* 9.0* 8.9*   HCT 25.4*   < > 23.7*   < > 29.8* 30.5* 31.5*   *  --  467*  --   --   --   --     < > = values in this interval not displayed. Current warfarin drug-drug interactions: Citalopram (HM), Simvastatin (HM)      Date INR Warfarin Dose   5/2/2019 1.24 5.5 mg                                   Daily PT/INR until stable within therapeutic range. Thank you for the consult.

## 2019-05-02 NOTE — CARE COORDINATION
5/2/19, 11:41 AM    DISCHARGE BARRIERS      Spoke with Lorraine Rose RN, who shares discharge is now anticipated for tomorrow, and that daughter is here and aware of plan.

## 2019-05-02 NOTE — CARE COORDINATION
DISCHARGE BARRIERS  5/2/19, 9:48 AM    Reason for Referral:  Return to [facility]   Mental Status:  Alert, answers yes and no questions   Decision Making:  Makes own decisions with family support   Family/Social/Home Environment:  Hayley Yun is a short term resident at [facility]. She an family plan for her to return there at discharge. Her  is a resident at [facility] also. Current Services:  Skilled care at [address]: wheelchair   Payment Source: medicare,   Concerns or Barriers to Discharge: planning return to [facility] were provided: declined     Teach Back Method used with patient regarding care plan and discharge plan  Patient and daughter verbalize understanding of the plan of care and contribute to goal setting. Anticipated Needs/Discharge Plan:  Spoke with [place], along with RN, who confirms plan for return to [facility]. Spoke with daughter, Pablo Wagner, by phone. She confirms plan for return to [facility], and is requesting ambulance transport.        Electronically signed by NISHA Orlando on 5/2/2019 at 9:48 AM

## 2019-05-02 NOTE — PROGRESS NOTES
Gastrointestinal Progress Note      Patient:  Sterling Cook  Unit/Bed:7K-10/010-A       YOB: 1940    MRN: [id number]                      Acct: [de-identified]     Admit date: 4/29/2019      Subjective:  Patient sitting in bed. RN states that patient is most likely being discharged today. Suppositories were ordered by hospitalist to help with the hemorrhoids. Pt has no complaints and smiled and said \"yeah\" when I told her it sounds like she is being discharged. Objective:       CBC:   Recent Labs     04/29/19  1552  04/30/19 0344  05/01/19 0729 05/01/19 1958 05/02/19 0719   WBC 13.0*  --  11.2*  --   --   --   --    HGB 7.2*   < > 6.6*   < > 8.7* 9.0* 8.9*   *  --  467*  --   --   --   --     < > = values in this interval not displayed. BMP:    Recent Labs     04/29/19  1552 04/30/19 0344 05/02/19 0719    139 140   K 4.7 4.1 4.3    105 107   CO2 26 22* 25   BUN 21 17 12   CREATININE 1.2 1.0 0.9   GLUCOSE 194* 170* 179*     Calcium:  Recent Labs     05/02/19 0719   CALCIUM 8.2*     Ionized Calcium:No results for input(s): IONCA in the last 72 hours. Magnesium:No results for input(s): MG in the last 72 hours. Phosphorus:No results for input(s): PHOS in the last 72 hours. INR:   Recent Labs     05/01/19 0729   INR 1.38*     Hepatic:   Recent Labs     04/29/19 1552   ALKPHOS 67   ALT 8*   AST 9   PROT 6.9   BILITOT <0.2*   LABALBU 3.2*     Amylase and Lipase:No results for input(s): LACTA, AMYLASE in the last 72 hours. Lactic Acid: No results for input(s): LACTA in the last 72 hours. Physical Exam:  Vitals:    05/02/19 0554   BP: 139/60   Pulse: 79   Resp: 20   Temp: 98 °F (36.7 °C)   SpO2: 93%     GEN: Well nourished, well developed. LUNGS:  Clear to auscultation bilaterally. Chest rises equally on inspiration. CARDIOVASCULAR:  Regular rate and rhythm without murmurs, rubs or gallops.   ABDOMEN:  Soft, larger, nontender and nondistended with normal bowel sounds. HEAD:  Normal cephalic/atraumatic. NECK:  Neck supple. Trachea midline      UPPER EXTREMITIES:  No cyanosis, clubbing, left hand has some edema. DERM:  No rash or jaundice. LOWER EXTREMITIES:  No cyanosis, clubbing, or edema. NEURO:  Alert with expressive aphasia. Made good eye contact  Patient moves all extremities and has gross sensation in all extremities. Medications:  Scheduled Meds:   warfarin  5 mg Oral Daily    hydrocortisone  25 mg Rectal BID    ferrous sulfate  325 mg Oral BID WC    hydrALAZINE  25 mg Oral 3 times per day    insulin glargine  18 Units Subcutaneous Nightly    citalopram  10 mg Oral Daily    oxybutynin  10 mg Oral Daily    simvastatin  20 mg Oral Nightly    insulin lispro  0-6 Units Subcutaneous TID WC    insulin lispro  0-3 Units Subcutaneous Nightly    sodium chloride flush  10 mL Intravenous 2 times per day    amLODIPine  10 mg Oral Daily    And    lisinopril  20 mg Oral Daily     Continuous Infusions:   dextrose       Colonoscopy    IMPRESSION:   1. Sessile polyp 1cm located in the sigmoid colon , removed with cold snare polypectomy. 2. Internal Hemorrhoids, likely source of bleeding. 3. Otherwise, normal colonoscopy to the cecum. No active bleeding at this time. 4. Baseline Pulse Ox 88 on 2 Lpm of Oxygen.      RECOMMENDATIONS:    1. Follow up with Dr. Gaby Calderon as outpatient, consider treatment of hemorrhoids. 2.   Resume diet and medications. 3.   If rebleeds, then patient will need a Bleeding Scan. ASSESSMENT:     1. \"Bloody stools\"/ hematochezia --probably from Internal hemorrhoids  2. Iron deficient anemia per 2018 office note H/H 8.9/31.5  3. Expressive aphasia s/p CVA  4. CAD  5. HTN  6. DM2  7. Hx CVA with right sided weakness-on Coumadin   8. Hx SBO   9. Hx colon polyps; hyperplastic and tubular adenoma 2014  10. Hx hemorrhoids with hemorrhoidectomy   11. Obese        PLAN:   1.  On

## 2019-05-02 NOTE — CARE COORDINATION
5/2/19, 11:09 AM    Discharge plan discussed by  and . Discharge plan reviewed with patient/ family. Patient/ family verbalize understanding of discharge plan and are in agreement with plan. Understanding was demonstrated using the teach back method.    Services After Discharge  Services At/After Discharge: Skilled Therapy, Nursing Services, In ambulance   IMM Letter  IMM Letter given to Patient/Family/Significant other/Guardian/POA/by[de-identified]   IMM Letter date given[de-identified] 05/02/19  IMM Letter time given[de-identified] 0298

## 2019-05-03 ENCOUNTER — APPOINTMENT (OUTPATIENT)
Dept: GENERAL RADIOLOGY | Age: 79
DRG: 394 | End: 2019-05-03
Payer: MEDICARE

## 2019-05-03 VITALS
TEMPERATURE: 98 F | OXYGEN SATURATION: 92 % | DIASTOLIC BLOOD PRESSURE: 65 MMHG | SYSTOLIC BLOOD PRESSURE: 149 MMHG | WEIGHT: 229 LBS | BODY MASS INDEX: 42.14 KG/M2 | HEIGHT: 62 IN | RESPIRATION RATE: 20 BRPM | HEART RATE: 81 BPM

## 2019-05-03 LAB
ANION GAP SERPL CALCULATED.3IONS-SCNC: 7 MEQ/L (ref 8–16)
BUN BLDV-MCNC: 18 MG/DL (ref 7–22)
CALCIUM SERPL-MCNC: 8.3 MG/DL (ref 8.5–10.5)
CHLORIDE BLD-SCNC: 104 MEQ/L (ref 98–111)
CO2: 27 MEQ/L (ref 23–33)
CREAT SERPL-MCNC: 0.8 MG/DL (ref 0.4–1.2)
GFR SERPL CREATININE-BSD FRML MDRD: 70 ML/MIN/1.73M2
GLUCOSE BLD-MCNC: 195 MG/DL (ref 70–108)
GLUCOSE BLD-MCNC: 218 MG/DL (ref 70–108)
GLUCOSE BLD-MCNC: 249 MG/DL (ref 70–108)
HCT VFR BLD CALC: 31.1 % (ref 37–47)
HEMOGLOBIN: 9 GM/DL (ref 12–16)
INR BLD: 1.11 (ref 0.85–1.13)
MAGNESIUM: 2.1 MG/DL (ref 1.6–2.4)
POTASSIUM SERPL-SCNC: 4.1 MEQ/L (ref 3.5–5.2)
SODIUM BLD-SCNC: 138 MEQ/L (ref 135–145)

## 2019-05-03 PROCEDURE — 85014 HEMATOCRIT: CPT

## 2019-05-03 PROCEDURE — 71045 X-RAY EXAM CHEST 1 VIEW: CPT

## 2019-05-03 PROCEDURE — 85610 PROTHROMBIN TIME: CPT

## 2019-05-03 PROCEDURE — 2700000000 HC OXYGEN THERAPY PER DAY

## 2019-05-03 PROCEDURE — 6370000000 HC RX 637 (ALT 250 FOR IP): Performed by: FAMILY MEDICINE

## 2019-05-03 PROCEDURE — 36415 COLL VENOUS BLD VENIPUNCTURE: CPT

## 2019-05-03 PROCEDURE — 94640 AIRWAY INHALATION TREATMENT: CPT

## 2019-05-03 PROCEDURE — 94760 N-INVAS EAR/PLS OXIMETRY 1: CPT

## 2019-05-03 PROCEDURE — 85018 HEMOGLOBIN: CPT

## 2019-05-03 PROCEDURE — 80048 BASIC METABOLIC PNL TOTAL CA: CPT

## 2019-05-03 PROCEDURE — 99239 HOSP IP/OBS DSCHRG MGMT >30: CPT | Performed by: INTERNAL MEDICINE

## 2019-05-03 PROCEDURE — 82948 REAGENT STRIP/BLOOD GLUCOSE: CPT

## 2019-05-03 PROCEDURE — 6370000000 HC RX 637 (ALT 250 FOR IP): Performed by: INTERNAL MEDICINE

## 2019-05-03 PROCEDURE — 6370000000 HC RX 637 (ALT 250 FOR IP): Performed by: PHYSICIAN ASSISTANT

## 2019-05-03 PROCEDURE — 83735 ASSAY OF MAGNESIUM: CPT

## 2019-05-03 RX ORDER — HYDROCORTISONE ACETATE 25 MG/1
25 SUPPOSITORY RECTAL 2 TIMES DAILY
DISCHARGE
Start: 2019-05-03 | End: 2020-11-10

## 2019-05-03 RX ORDER — CARVEDILOL 6.25 MG/1
6.25 TABLET ORAL 2 TIMES DAILY
Qty: 60 TABLET | Refills: 3 | DISCHARGE
Start: 2019-05-03

## 2019-05-03 RX ORDER — FERROUS SULFATE 325(65) MG
325 TABLET ORAL 2 TIMES DAILY WITH MEALS
Qty: 30 TABLET | Refills: 3 | DISCHARGE
Start: 2019-05-03 | End: 2020-11-10 | Stop reason: DRUGHIGH

## 2019-05-03 RX ORDER — WARFARIN SODIUM 7.5 MG/1
7.5 TABLET ORAL ONCE
Status: DISCONTINUED | OUTPATIENT
Start: 2019-05-03 | End: 2019-05-03 | Stop reason: HOSPADM

## 2019-05-03 RX ORDER — IPRATROPIUM BROMIDE AND ALBUTEROL SULFATE 2.5; .5 MG/3ML; MG/3ML
1 SOLUTION RESPIRATORY (INHALATION) EVERY 4 HOURS PRN
Status: DISCONTINUED | OUTPATIENT
Start: 2019-05-03 | End: 2019-05-03 | Stop reason: HOSPADM

## 2019-05-03 RX ADMIN — CITALOPRAM 10 MG: 20 TABLET, FILM COATED ORAL at 08:51

## 2019-05-03 RX ADMIN — HYDRALAZINE HYDROCHLORIDE 25 MG: 25 TABLET, FILM COATED ORAL at 14:56

## 2019-05-03 RX ADMIN — INSULIN LISPRO 2 UNITS: 100 INJECTION, SOLUTION INTRAVENOUS; SUBCUTANEOUS at 13:02

## 2019-05-03 RX ADMIN — FERROUS SULFATE TAB 325 MG (65 MG ELEMENTAL FE) 325 MG: 325 (65 FE) TAB at 08:51

## 2019-05-03 RX ADMIN — OXYBUTYNIN 10 MG: 10 TABLET, FILM COATED, EXTENDED RELEASE ORAL at 08:51

## 2019-05-03 RX ADMIN — IPRATROPIUM BROMIDE AND ALBUTEROL SULFATE 1 AMPULE: .5; 3 SOLUTION RESPIRATORY (INHALATION) at 05:42

## 2019-05-03 RX ADMIN — LISINOPRIL 20 MG: 20 TABLET ORAL at 08:51

## 2019-05-03 RX ADMIN — INSULIN LISPRO 2 UNITS: 100 INJECTION, SOLUTION INTRAVENOUS; SUBCUTANEOUS at 08:51

## 2019-05-03 RX ADMIN — HYDRALAZINE HYDROCHLORIDE 25 MG: 25 TABLET, FILM COATED ORAL at 05:09

## 2019-05-03 RX ADMIN — AMLODIPINE BESYLATE 10 MG: 10 TABLET ORAL at 08:51

## 2019-05-03 ASSESSMENT — PAIN SCALES - GENERAL: PAINLEVEL_OUTOF10: 0

## 2019-05-03 NOTE — FLOWSHEET NOTE
Dr. Mahin Sanford returned page asking for PRN breathing treatments for patient. PRN breathing treatments ordered every four hours.

## 2019-05-03 NOTE — PLAN OF CARE
Problem: Falls - Risk of:  Goal: Will remain free from falls  Description  Will remain free from falls   5/3/2019 0053 by Ta Berry RN  Outcome: Met This Shift  Note:   Patient has not had any falls during this shift. Bed in lowest position with wheels locked and call light in reach. Patient compliant with using call light to request assistance from staff when needed. Fall prevention measures in place and patient compliant. Hourly rounding in place and bed alarm on. Problem: Cardiovascular  Goal: No DVT, peripheral vascular complications  5/7/9144 4131 by Ta Berry RN  Outcome: Met This Shift  Note:   No signs or symptoms of DVT noted to patient this shift. Negative Rocío's sign bialterally. No redness, swelling, or warmth noted to bilateral calves. Patient compliant with wearing SCDs while in bed. Will continue to monitor. Problem:   Goal: Adequate urinary output  5/3/2019 0053 by Ta Berry RN  Outcome: Met This Shift  Note:   Patient with external catheter in place and draining adequate amounts of urine. Problem: Nutrition  Goal: Optimal nutrition therapy  5/3/2019 0053 by Ta Berry RN  Outcome: Met This Shift  Note:   Patient on carb control diet and tolerating well. No nausea/vomiting noted to patient this shift. Problem: Pain Control  Goal: Maintain pain level at or below patient's acceptable level (or 5 if patient is unable to determine acceptable level)  Outcome: Met This Shift  Flowsheets (Taken 5/3/2019 0053)  Patient's Stated Pain Goal: No pain  Note:   Patient with pain goal of no pain. Patient denies pain so far this shift. Will continue to assess patient for pain and administer pain medication as appropriate. Problem: Risk for Impaired Skin Integrity  Goal: Tissue integrity - skin and mucous membranes  Description  Structural intactness and normal physiological function of skin and  mucous membranes.    5/3/2019 0053 by Ta Berry RN  Outcome: Ongoing  Note:   Patient with redness to buttocks. Patient turned in bed every two hours and heels elevated off of bed. Will monitor. Problem: Respiratory  Goal: No pulmonary complications  7/0/6206 8369 by Emilia Sanchez RN  Outcome: Ongoing  Note:   Patient on two liters of oxygen per nasal cannula. Patient with oxygen above 90% on two liters of oxygen per nasal cannula. Lungs clear. Patient denies shortness of breath. Problem: GI  Goal: No bowel complications  2/6/6215 4121 by Emilia Sanchez RN  Outcome: Ongoing  Note:   Patient has not had a BM this shift. Bowel sounds active. Will monitor. Problem: DAILY CARE  Goal: Daily care needs are met  5/3/2019 0053 by Emilia Sanchez RN  Outcome: Ongoing  Note:   Patient requires assistance from staff with ADLs. Patient is able to use call light to request assistance when needed. Will continue to address patient's needs as they arise. Problem: DISCHARGE BARRIERS  Goal: Patient's continuum of care needs are met  5/3/2019 0053 by Emilia Sanchez RN  Outcome: Ongoing  Note:   Patient planning discharge to [facility].  assisting patient with discharge needs. Problem: Falls - Risk of:  Goal: Absence of physical injury  Description  Absence of physical injury   5/3/2019 0053 by Emilia Sanchez RN  Outcome: Completed  Note:   No accidental physical injury to patient this shift. Bed in lowest position with wheels locked and call light in reach. Care plan reviewed with patient. Patient verbalizes understanding of plan of care and contributes to goal setting.

## 2019-05-03 NOTE — CARE COORDINATION
5/3/19, 1:44 PM    DISCHARGE BARRIERS      Spoke with daughter, Masha Campbell. She is in agreement with discharge to [facility] today and requests ambulance transport. She will be here later this afternoon, so that she is with patient at discharge. Confirmed plan with [facility] admissions. [address] faxed to [facility]. Transport request faxed to [facility].      5/3/19, 1:44 PM    Discharge plan discussed by  and . Discharge plan reviewed with patient/ family. Patient/ family verbalize understanding of discharge plan and are in agreement with plan. Understanding was demonstrated using the teach back method.    Services After Discharge  Services At/After Discharge: Skilled Therapy, Nursing Services, In ambulance   IMM Letter  IMM Letter given to Patient/Family/Significant other/Guardian/POA/by[de-identified]   IMM Letter date given[de-identified] 05/02/19  IMM Letter time given[de-identified] 5045

## 2019-05-03 NOTE — PROGRESS NOTES
Clinical Pharmacy Note    Warfarin consult follow-up    Recent Labs     05/03/19  0752   INR 1.11     Recent Labs     05/02/19  0719 05/02/19  1941 05/03/19  0752   HGB 8.9* 8.6* 9.0*   HCT 31.5* 29.1* 31.1*       Significant Drug-Drug Interactions:  New warfarin drug-drug interactions: none  Discontinued drug-drug interactions: none  Current warfarin drug-drug interactions: Citalopram (HM), Simvastatin (HM)      Date INR Warfarin Dose   5/2/2019 1.24 5.5 mg   5/3/2019 1.11 7.5 mg            Notes:                     Daily PT/INR until stable within therapeutic range. Lavern Bright Pharm. D., BCPS, BCCCP 5/3/2019 9:17 AM

## 2019-05-06 ENCOUNTER — OUTSIDE SERVICES (OUTPATIENT)
Dept: FAMILY MEDICINE CLINIC | Age: 79
End: 2019-05-06
Payer: MEDICARE

## 2019-05-06 DIAGNOSIS — R53.81 PHYSICAL DEBILITY: Primary | ICD-10-CM

## 2019-05-06 PROCEDURE — 99497 ADVNCD CARE PLAN 30 MIN: CPT | Performed by: NURSE PRACTITIONER

## 2019-05-09 NOTE — PATIENT INSTRUCTIONS
171 blood glucose, 95%  Weight in pounds:  233  Pain: Denies    VS reviewed. General Appearance: chronically ill and debilitated, in no acute distress  Head: normocephalic and atraumatic  Eyes: conjunctivae and eye lids without erythema  ENT: external ear and ear canal normal bilaterally, nose without deformity, nasal mucosa and turbinates normal without polyps, oropharynx normal, dentition is normal for age, no lip or gum lesions noted  Neck: supple and non-tender without mass, no thyromegaly or thyroid nodules, no cervical lymphadenopathy  Pulmonary/Chest: clear but diminished to auscultation bilaterally- no wheezes, rales or rhonchi, normal air movement, no respiratory distress or retractions, requires no supplemental oxygen  Cardiovascular: normal rate, regular rhythm, normal S1 and S2, no murmurs, rubs, clicks, or gallops, distal pulses intact  Abdomen: soft, non-tender, non-distended, bowel sounds physiologic,  no rebound or guarding, no masses or hernias noted. Liver and spleen without enlargement. Extremities: no cyanosis, clubbing or edema of the lower extremities  Musculoskeletal: No joint swelling or gross deformity   Neuro:  Alert, 4/5 strength globally and symmetrically, normal speech, no focal findings or movement disorder noted  Psych:  Normal affect without evidence of depression or anxiety, insight and judgement are impaired, memory appears impaired  Skin: warm and dry, no rash or erythema    ASSESSMENT & PLAN:    1. Physical debility  Discussed code status with patient and spouse, Avila Feliciano. Patient and spouse agree to DNR/CCA code status with no CPR, defibrillation, intubation. Disposition:  Assessment and plan as stated above. Follow-up per routine and as warranted. Plan of care reviewed by Dr. Herman Mustafa DO.   Electronically signed by AYAKA Law NP on 5/9/2019 at 7:38 PM

## 2019-05-12 ENCOUNTER — OUTSIDE SERVICES (OUTPATIENT)
Dept: FAMILY MEDICINE CLINIC | Age: 79
End: 2019-05-12
Payer: MEDICARE

## 2019-05-12 DIAGNOSIS — K92.2 GASTROINTESTINAL HEMORRHAGE, UNSPECIFIED GASTROINTESTINAL HEMORRHAGE TYPE: ICD-10-CM

## 2019-05-12 DIAGNOSIS — D62 ACUTE BLOOD LOSS ANEMIA: ICD-10-CM

## 2019-05-12 DIAGNOSIS — I10 ESSENTIAL HYPERTENSION: Primary | ICD-10-CM

## 2019-05-12 DIAGNOSIS — E11.69 DIABETES MELLITUS TYPE 2 IN OBESE (HCC): ICD-10-CM

## 2019-05-12 DIAGNOSIS — E66.9 DIABETES MELLITUS TYPE 2 IN OBESE (HCC): ICD-10-CM

## 2019-05-12 DIAGNOSIS — I48.20 CHRONIC ATRIAL FIBRILLATION (HCC): Chronic | ICD-10-CM

## 2019-05-12 PROCEDURE — 3288F FALL RISK ASSESSMENT DOCD: CPT | Performed by: FAMILY MEDICINE

## 2019-05-12 PROCEDURE — 0518F FALL PLAN OF CARE DOCD: CPT | Performed by: FAMILY MEDICINE

## 2019-05-12 PROCEDURE — 1123F ACP DISCUSS/DSCN MKR DOCD: CPT | Performed by: FAMILY MEDICINE

## 2019-05-12 PROCEDURE — 99309 SBSQ NF CARE MODERATE MDM 30: CPT | Performed by: FAMILY MEDICINE

## 2019-05-12 ASSESSMENT — ENCOUNTER SYMPTOMS
EYE PAIN: 0
VOMITING: 0
DIARRHEA: 0
WHEEZING: 0
BLOOD IN STOOL: 0
CONSTIPATION: 0
SHORTNESS OF BREATH: 0
NAUSEA: 0
SORE THROAT: 0
BACK PAIN: 0
EYE DISCHARGE: 0
COUGH: 0
EYE REDNESS: 0

## 2019-05-12 NOTE — PROGRESS NOTES
Clemente Elena is a 70 y.o. female thatpresents for Other (GI bleed)        HPI:  HPI:  Pt seen and examined at bedside. Patient was recently admitted to [facility] for treatment of anemia. Per DC summary:      Hospital Course:  74 y. o. female who presented to [facility] from her nursing home 2/2 to having a low Hgb today. Claudia Narayan is a very poor historian and can not obtain any information from her.  Per nurse, pt had a low hemoglobin of 6.8 at her nursing home and some bloody stools at her nursing home and was sent to the ED for evaluation. Pt has pmh of CVA and Atrial Fib on ASA and coumadin.      Hemoglobin found to be 6.6. Transfused 2U pRBCs on 4/30. Fe and Ferritin found to be extremely low (18 and 3). Given IV Fe.      Colonoscopy on 5/1 showing no active bleeding, just internal hemorrhoids which was likely the source     Today, patient states that she is doing well and denies any acute issues. She is a relatively poor historian. States that she is feeling well, denies any symptoms. Eating well. Denies constipation. Her  does not voice any concerns. I have reviewed the patient's past medical history, past surgical history, allergies,medications, social and family history and I have made updates where appropriate.     Past Medical History:   Diagnosis Date    Atrial fibrillation ([address]     CAD (coronary artery disease)     Cerebrovascular disease     Depression     Hyperlipidemia     Hypertension     Self-care deficit for dressing and grooming     Self-care deficit for medication administration 2010    Type II or unspecified type diabetes mellitus without mention of complication, not stated as uncontrolled 2010    Unspecified cerebral artery occlusion with cerebral infarction        Past Surgical History:   Procedure Laterality Date    CARDIAC CATHETERIZATION  2004    CHOLECYSTECTOMY  1965    COLONOSCOPY      COLONOSCOPY N/A 5/1/2019    COLONOSCOPY POLYPECTOMY SNARE/COLD BIOPSY performed by Alyson Brown MD at [facility] [address]   [facility] JOINT REPLACEMENT  2008    Lt Total Knee    JOINT REPLACEMENT  2009    Rt Total Knee       Social History     Tobacco Use    Smoking status: Never Smoker    Smokeless tobacco: Never Used   Substance Use Topics    Alcohol use: No    Drug use: No       Family History   Problem Relation Age of Onset    Cancer Mother         kidney         Review of Systems   Constitutional: Negative for chills and fever. HENT: Negative for congestion, ear pain, nosebleeds, sore throat and tinnitus. Eyes: Negative for pain, discharge and redness. Respiratory: Negative for cough, shortness of breath and wheezing. Cardiovascular: Negative for chest pain, palpitations and leg swelling. Gastrointestinal: Negative for blood in stool, constipation, diarrhea, nausea and vomiting. Endocrine: Negative for polydipsia. Genitourinary: Negative for dysuria, frequency, hematuria and urgency. Musculoskeletal: Negative for back pain and myalgias. Skin: Negative for rash. Allergic/Immunologic: Negative for environmental allergies. Neurological: Negative for dizziness, tremors, seizures, weakness and headaches. Hematological: Does not bruise/bleed easily. Psychiatric/Behavioral: Negative for hallucinations and suicidal ideas. The patient is not nervous/anxious. PHYSICAL EXAM:  LMP  (Exact Date)     VS  228lbs, 135/58, 98.4, 69, 17    Physical Exam   Constitutional: She appears well-developed and well-nourished. No distress. HENT:   Head: Normocephalic and atraumatic. Right Ear: Hearing and external ear normal.   Left Ear: Hearing and external ear normal.   Nose: Nose normal.   Mouth/Throat: Oropharynx is clear and moist.   Eyes: Conjunctivae are normal. Right eye exhibits no discharge. Left eye exhibits no discharge. Neck: Normal range of motion. Neck supple. No tracheal deviation present. No thyromegaly present. Cardiovascular: Normal rate, regular rhythm and normal heart sounds. Exam reveals no gallop and no friction rub. No murmur heard. Pulmonary/Chest: Effort normal. No respiratory distress. She has no wheezes. She has no rales. She exhibits no tenderness. Musculoskeletal: She exhibits no deformity. Lymphadenopathy:     She has no cervical adenopathy. Neurological: She is alert. She exhibits normal muscle tone. Coordination normal.   Skin: Skin is warm and dry. No rash noted. No erythema. Psychiatric: She has a normal mood and affect. Her behavior is normal. Thought content normal. Cognition and memory are impaired. She expresses inappropriate judgment. Nursing note and vitals reviewed. ASSESSMENT & PLAN  Kim Velasquez was seen today for other. Diagnoses and all orders for this visit:    Essential hypertension    Chronic atrial fibrillation ([facility]    Diabetes mellitus type 2 in [facility])    Gastrointestinal hemorrhage, unspecified gastrointestinal hemorrhage type    Acute blood loss anemia      1. GI bleed:  Cont FeSul  2. CVA, CAD:  cont ASA  3. DM2:  cont Januvia, Basaglar, Humilin  4. HTN:  cont Lisinopril, Lotrel  5. HLD:  cont Zocor  6. GERD:  cont Protonix  7. MDD:  cont Celexa  8. AFib:  cont coreg, coumadin    All patient questions answered. Patient voiced understanding.

## 2019-05-28 NOTE — DISCHARGE SUMMARY
Hospital Medicine Discharge Summary      Patient Identification:   Mahendra Man   : 1940  MRN: [id number]   Account: [de-identified]      Patient's PCP: Neo Turpin DO    Admit Date: 2019     Discharge Date: 5/3/2019      Admitting Physician: Suad Richey MD     Discharge Physician: Oni Sharma MD     Discharge Diagnoses: Active Hospital Problems    Diagnosis Date Noted    GIB (gastrointestinal bleeding) [K92.2] 2019       The patient was seen and examined on day of discharge and this discharge summary is in conjunction with any daily progress note from day of discharge. Hospital Course:   Mahendra Man is a 70 y.o. female admitted to [address] [place] [address] on 2019 for . 70 y. o. female who presented to [address] [place] [address] from her nursing home  to having a low Hgb today. Clarice Scherer is a very poor historian and can not obtain any information from her.  Per nurse, pt had a low hemoglobin of 6.8 at her nursing home and some bloody stools at her nursing home and was sent to the ED for evaluation. Pt has pmh of CVA and Atrial Fib on ASA and coumadin.      Hemoglobin found to be 6.6. Transfused 2U pRBCs on . Fe and Ferritin found to be extremely low (18 and 3). Given IV Fe.      Colonoscopy on  showing no active bleeding, just internal hemorrhoids which was likely the source     Hgb stable on DC      Exam:     Vitals:  Vitals:    19 0505 19 0817 19 0845 19 1445   BP: (!) 164/68  (!) 150/61 (!) 149/65   Pulse: 82  76 81   Resp: 28  20 20   Temp:   98 °F (36.7 °C)    TempSrc:   Oral    SpO2: 93% 91% 90% 92%   Weight:       Height:         Weight: Weight: 229 lb (103.9 kg)     24 hour intake/output:No intake or output data in the 24 hours ending 19 1035      General appearance:  No apparent distress, appears stated age and cooperative. HEENT:  Normal cephalic, atraumatic without obvious deformity.  Pupils equal, round, and reactive to light. Extra ocular muscles intact. Conjunctivae/corneas clear. Neck: Supple, with full range of motion. No jugular venous distention. Trachea midline. Respiratory:  Normal respiratory effort. Clear to auscultation, bilaterally without Rales/Wheezes/Rhonchi. Cardiovascular:  Regular rate and rhythm with normal S1/S2 without murmurs, rubs or gallops. Abdomen: Soft, non-tender, non-distended with normal bowel sounds. Musculoskeletal:  No clubbing, cyanosis or edema bilaterally. Full range of motion without deformity. Skin: Skin color, texture, turgor normal.  No rashes or lesions. Neurologic:  Neurovascularly intact without any focal sensory/motor deficits. Cranial nerves: II-XII intact, grossly non-focal.  Psychiatric:  Alert and oriented, thought content appropriate, normal insight  Capillary Refill: Brisk,< 3 seconds   Peripheral Pulses: +2 palpable, equal bilaterally       Labs: For convenience and continuity at follow-up the following most recent labs are provided:      CBC:    Lab Results   Component Value Date    WBC 11.2 04/30/2019    HGB 9.0 05/03/2019    HCT 31.1 05/03/2019     04/30/2019       Renal:    Lab Results   Component Value Date     05/03/2019    K 4.1 05/03/2019    K 4.1 04/30/2019     05/03/2019    CO2 27 05/03/2019    BUN 18 05/03/2019    CREATININE 0.8 05/03/2019    CALCIUM 8.3 05/03/2019    PHOS 2.5 12/02/2011         Significant Diagnostic Studies    Radiology:   XR CHEST PORTABLE   Final Result   1. There are left perihilar and left basilar infiltrates. There is no pleural effusion. There is no pulmonary vascular congestion. Follow-up chest radiograph recommended to confirm complete resolution. **This report has been created using voice recognition software. It may contain minor errors which are inherent in voice recognition technology. **      Final report electronically signed by Dr. Gisele Nash on 5/3/2019 12:26 PM      XR Acute Abd Series Chest 1 VW   Final Result   1. No evidence of acute cardiopulmonary process. 2. Nonspecific, nonobstructive bowel gas pattern. **This report has been created using voice recognition software. It may contain minor errors which are inherent in voice recognition technology. **      Final report electronically signed by Dr. Sophia Rendon MD on 4/29/2019 4:50 PM             Consults:     IP CONSULT TO GI  IP CONSULT TO SOCIAL WORK  PALLIATIVE CARE EVAL  PHARMACY TO DOSE WARFARIN    Disposition: Home  Condition at Discharge: Stable    Code Status:  Prior     Patient Instructions:    Discharge lab work: Activity: activity as tolerated  Diet: No diet orders on file      Follow-up visits:   DO Kalpesh Watsonmaalicia 5  [address] [facility] [address]  [facility] [address]  [phone]           [address]  [address] [facility].   [facility] [address]  [address] [place] [address], [facility]. [facility]  [address] [facility]  [phone]    In 3 weeks           Discharge Medications:      Regina Muller   Home Medication Instructions VUI:[id number]    Printed on:05/28/19 1031   Medication Information                      amLODIPine-benazepril (LOTREL) 10-20 MG per capsule  TAKE ONE CAPSULE BY MOUTH ONCE DAILY FOR HIGH  BLOOD PRESSURE             Ascorbic Acid (VITAMIN C PO)  Take 1 tablet by mouth three times a week              calcium carbonate (OSCAL) 500 MG TABS tablet  Take 500 mg by mouth Three times weekly             carvedilol (COREG) 6.25 MG tablet  Take 1 tablet by mouth 2 times daily             citalopram (CELEXA) 10 MG tablet  TAKE ONE TABLET BY MOUTH ONCE DAILY             docusate sodium (COLACE) 100 MG capsule  Take 100 mg by mouth daily             enoxaparin (LOVENOX) 100 MG/ML injection  Inject 1 mL into the skin 2 times daily             ferrous sulfate 325 (65 Fe) MG tablet  Take 1 tablet by mouth 2 times daily (with meals)             Glucagon HCl, rDNA, (GLUCAGEN HYPOKIT IJ)  Inject 1 mg as directed as needed. Indications: Extremely Low Blood Sugar             glucose blood VI test strips (ONE TOUCH TEST STRIPS) strip  Test blood sugar with One Touch Ultra 2 QID and as needed. hydrocortisone (ANUSOL-HC) 25 MG suppository  Place 1 suppository rectally 2 times daily             insulin glargine (LANTUS SOLOSTAR) 100 UNIT/ML injection pen  Inject 20 Units into the skin nightly             Insulin Pen Needle (PEN NEEDLES) 29G X 12MM MISC  by Does not apply route daily. insulin regular (HUMULIN R;NOVOLIN R) 100 UNIT/ML injection  Inject into the skin 4 times daily (before meals and nightly) 151-200= 2 units, 201-250= 4 units, 251-300= 6 units, 301-350= 8 units, 351-400= 10 units, 401+= call MD             JANUVIA 100 MG tablet  TAKE ONE TABLET BY MOUTH ONCE DAILY             metFORMIN (GLUCOPHAGE) 500 MG tablet  TAKE ONE TABLET BY MOUTH ONCE DAILY WITH BREAKFAST             oxybutynin (DITROPAN-XL) 10 MG extended release tablet  TAKE ONE TABLET BY MOUTH ONCE DAILY             pantoprazole (PROTONIX) 40 MG tablet  TAKE ONE TABLET BY MOUTH ONCE DAILY             RELION SHORT PEN NEEDLES 31G X 8 MM MISC  USE TO INJECT ONCE DAILY SUBCUTANEOUSLY             simvastatin (ZOCOR) 20 MG tablet  TAKE ONE TABLET BY MOUTH ONCE DAILY AT  NIGHT             vitamin E 400 UNIT capsule  Take 400 Units by mouth daily             warfarin (COUMADIN) 6 MG tablet  Take as directed by [facility] 30 tabs = 30 days                 Time Spent on discharge is more than 30 minutes in the examination, evaluation, counseling and review of medications and discharge plan. Signed: Thank you Sandeep Summers DO for the opportunity to be involved in this patient's care.     Electronically signed by Lucia Sheppard MD on 5/28/2019 at 10:35 AM

## 2019-06-03 ENCOUNTER — OUTSIDE SERVICES (OUTPATIENT)
Dept: FAMILY MEDICINE CLINIC | Age: 79
End: 2019-06-03
Payer: MEDICARE

## 2019-06-03 DIAGNOSIS — H61.23 BILATERAL HEARING LOSS DUE TO CERUMEN IMPACTION: Primary | ICD-10-CM

## 2019-06-03 PROCEDURE — 99308 SBSQ NF CARE LOW MDM 20: CPT | Performed by: NURSE PRACTITIONER

## 2019-06-06 NOTE — PROGRESS NOTES
[address] Progress Note    NAME: Jenny Meade  DATE: 6/3/19  ROOM #: B 27-2  : 1940  REASON FOR VISIT: Other (cerumen impaction)    CODE STATUS: DNR/CCA    History obtained from chart review, the patient and chart review. SUBJECTIVE:  HPI: Jenny Meade is a 70 y.o. female. Patient has a PMH significant for:  Past Medical History:   Diagnosis Date    Atrial fibrillation ([facility]     CAD (coronary artery disease)     Cerebrovascular disease     Depression     Hyperlipidemia     Hypertension     Self-care deficit for dressing and grooming     Self-care deficit for medication administration     Type II or unspecified type diabetes mellitus without mention of complication, not stated as uncontrolled     Unspecified cerebral artery occlusion with cerebral infarction        Pt seen and examined at bedside today for her concerns of hearing loss and left ear discomfort. She is is noted to be in no acute distress at time of assessment and VS have been stable. Staff provide appropriate updates; appreciate staff input. I have reviewed patients past medical, surgical, social, and family history and have made updates where appropriate. Allergies and Medications were reviewed through the [facility] EMR.     Patient Active Problem List    Diagnosis Date Noted    Sepsis due to urinary tract infection ([facility] 2013     Priority: High     Class: Acute     replace inactive diagnosis      Delirium 2013     Priority: High     Class: Acute    Diarrhea in adult patient 2013     Priority: High     Class: Acute    Chronic atrial fibrillation ([address] 2019    Acute blood loss anemia 2019    GIB (gastrointestinal bleeding) 2019    DENIS (dyspnea on exertion) 2019    SBO (small bowel obstruction) ([facility] 2018    Spastic hemiplegia of right dominant side due to nontraumatic intraparenchymal hemorrhage of brain ([facility] 2018    BCC (basal cell carcinoma), arm, left 11/21/2017    Recurrent major depressive disorder, in partial remission ([facility] 09/20/2017    Ambulatory dysfunction     Bleeding on Coumadin     CVA, old, aphasia     Hemarthrosis of left knee 04/13/2015    Nausea and vomiting 04/13/2015    Diabetes mellitus type 2 in obese ([facility] 04/13/2015    Leukocytosis 04/13/2015    Iron deficiency anemia 01/29/2015    Medication monitoring encounter 01/29/2015    Ileus (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  05/17/2012    OAB (overactive bladder) 01/23/2012    Urinary incontinence 01/23/2012    S/P stroke due to cerebrovascular disease 10/21/2011    Hemiplegia affecting right dominant side ([facility] 10/21/2011    Aphasia as late effect of cerebrovascular accident 10/21/2011    HTN (hypertension) 10/12/2011    Hyperlipidemia 10/12/2011    OA (osteoarthritis), multiple joints 10/12/2011    GERD (gastroesophageal reflux disease) 10/12/2011    S/P TKR (total knee replacement), bilateral 10/12/2011       REVIEW OF SYSTEMS  Positive responses are highlighted in bold  Constitutional:  Fever, Chills, Night Sweats, Fatigue, Unexpected changes in weight  HENT:  Ear pain, Tinnitus, Nosebleeds, Trouble swallowing, Hearing loss, Sore throat  Cardiovascular:  Chest Pain, Palpitations, Orthopnea, Paroxysmal Nocturnal Dyspnea  Respiratory:  Cough, Wheezing, Shortness of breath, Chest tightness, Apnea  Gastrointestinal:  Nausea, Vomiting, Diarrhea, Constipation, Heartburn, Blood in stool  Genitourinary:  Difficulty or painful urination, Flank pain, Change in frequency, Urgency  Skin:  Color change, Rash, Itching, Wound  Musculoskeletal:  Joint pain, Back pain, Gait problems, Joint swelling, Myalgias  Neurological: Dizziness, Headaches, Presyncope, Numbness, Seizures, Tremors  Endocrine:  Heat Intolerance, Cold Intolerance, Polydipsia, Polyphagia, Polyuria    OBJECTIVE:  PHYSICAL EXAM:  VS:  162/69, 97.7, 67, 18, 138 cs, 94%  Weight in pounds:  224.4  Pain: As described below. VS reviewed. General Appearance: chronically ill and debilitated, in no acute distress  Head: normocephalic and atraumatic  Eyes: conjunctivae and eye lids without erythema  ENT: external ear normal bilaterally, nose without deformity, nasal mucosa and turbinates normal without polyps, oropharynx normal, dentition is normal for age, no lip or gum lesions noted. She is positive for impacted cerumen to both ears. Neck: supple and non-tender without mass, no thyromegaly or thyroid nodules, no cervical lymphadenopathy  Pulmonary/Chest: clear but diminished to auscultation bilaterally- no wheezes, rales or rhonchi, normal air movement, no respiratory distress or retractions, requires no supplemental oxygen  Cardiovascular: normal rate, regular rhythm, normal S1 and S2, no murmurs, rubs, clicks, or gallops, distal pulses intact  Abdomen: soft, non-tender, non-distended, bowel sounds physiologic,  no rebound or guarding, no masses or hernias noted. Liver and spleen without enlargement. Extremities: no cyanosis, clubbing or edema of the lower extremities  Musculoskeletal: No joint swelling or gross deformity   Neuro:  Alert, 4/5 strength globally and symmetrically, normal speech, no focal findings or movement disorder noted  Psych:  Normal affect without evidence of depression or anxiety, insight and judgement are mildly impaired, memory appears mildly impaired  Skin: warm and dry, no rash or erythema    ASSESSMENT & PLAN:    1. Bilateral hearing loss due to cerumen impaction  Pt c/o mild discomfort to left ear this morning, as well as loss of hearing to both ears.   VS are stable and she denies fever, chills, headaches, vertigo, sinus

## 2019-06-07 ENCOUNTER — OUTSIDE SERVICES (OUTPATIENT)
Dept: FAMILY MEDICINE CLINIC | Age: 79
End: 2019-06-07
Payer: MEDICARE

## 2019-06-07 DIAGNOSIS — H61.23 BILATERAL IMPACTED CERUMEN: Primary | ICD-10-CM

## 2019-06-07 PROCEDURE — 69210 REMOVE IMPACTED EAR WAX UNI: CPT | Performed by: NURSE PRACTITIONER

## 2019-06-07 NOTE — PROGRESS NOTES
[facility] Progress Note    NAME: Luster Eisenmenger  DATE: 19  ROOM #: B 27-2  : 1940  REASON FOR VISIT: Other (Assessment and irrigation of bilateral ear canals.)    CODE STATUS: DNR/CCA    History obtained from chart review, the patient and chart review. SUBJECTIVE:  HPI: Luster Eisenmenger is a 70 y.o. female. Patient has a PMH significant for:  Past Medical History:   Diagnosis Date    Atrial fibrillation ([address]     CAD (coronary artery disease)     Cerebrovascular disease     Depression     Hyperlipidemia     Hypertension     Self-care deficit for dressing and grooming     Self-care deficit for medication administration     Type II or unspecified type diabetes mellitus without mention of complication, not stated as uncontrolled     Unspecified cerebral artery occlusion with cerebral infarction        Pt seen and re-examined at bedside today for re-evaluation of cerumen impaction. She is is noted to be in no acute distress at time of assessment and VS have been stable. Staff provide appropriate updates; appreciate staff input. I have reviewed patients past medical, surgical, social, and family history and have made updates where appropriate. Allergies and Medications were reviewed through the [facility] EMR.     Patient Active Problem List    Diagnosis Date Noted    Sepsis due to urinary tract infection ([facility] [facility] 2013     Priority: High     Class: Acute     replace inactive diagnosis      Delirium 2013     Priority: High     Class: Acute    Diarrhea in adult patient 2013     Priority: High     Class: Acute    Chronic atrial fibrillation ([address] 2019    Acute blood loss anemia 2019    GIB (gastrointestinal bleeding) 2019    DENIS (dyspnea on exertion) 2019    SBO (small bowel obstruction) ([facility]) 2018    Spastic hemiplegia of right dominant side due to nontraumatic intraparenchymal hemorrhage of brain ([address] 2018    BCC (basal cell carcinoma), arm, left 11/21/2017    Recurrent major depressive disorder, in partial remission ([facility] 09/20/2017    Ambulatory dysfunction     Bleeding on Coumadin     CVA, old, aphasia     Hemarthrosis of left knee 04/13/2015    Nausea and vomiting 04/13/2015    Diabetes mellitus type 2 in obese ([facility] 04/13/2015    Leukocytosis 04/13/2015    Iron deficiency anemia 01/29/2015    Medication monitoring encounter 01/29/2015    Ileus ([facility] 12/15/2014    Abdominal pain 12/14/2014    Leukocytosis 12/14/2014    Pleural effusion 12/14/2014    History of cardioembolic cerebrovascular accident (CVA) 12/14/2014    Anemia     Depression 01/21/2014    Skin neoplasm 07/11/2013    Myxoma of heart 44/57/4895    Folliculitis, lt face.  05/17/2012    OAB (overactive bladder) 01/23/2012    Urinary incontinence 01/23/2012    S/P stroke due to cerebrovascular disease 10/21/2011    Hemiplegia affecting right dominant side ([facility] 10/21/2011    Aphasia as late effect of cerebrovascular accident 10/21/2011    HTN (hypertension) 10/12/2011    Hyperlipidemia 10/12/2011    OA (osteoarthritis), multiple joints 10/12/2011    GERD (gastroesophageal reflux disease) 10/12/2011    S/P TKR (total knee replacement), bilateral 10/12/2011       REVIEW OF SYSTEMS  Positive responses are highlighted in bold  Constitutional:  Fever, Chills, Night Sweats, Fatigue, Unexpected changes in weight  HENT:  Ear pain, Tinnitus, Nosebleeds, Trouble swallowing, Hearing loss, Sore throat  Cardiovascular:  Chest Pain, Palpitations, Orthopnea, Paroxysmal Nocturnal Dyspnea  Respiratory:  Cough, Wheezing, Shortness of breath, Chest tightness, Apnea  Gastrointestinal:  Nausea, Vomiting, Diarrhea, Constipation, Heartburn, Blood in stool  Genitourinary:  Difficulty or painful urination, Flank pain, Change in frequency, Urgency  Skin:  Color change, Rash, Itching, Wound  Musculoskeletal:  Joint pain, Back pain, Gait problems, Joint swelling, CNP manually irrigated patient's bilateral ears with warm tap water. Large amounts of thick wax were removed with irrigation. Patient tolerated this well. Asking staff to continue to monitor. Disposition:  Assessment and plan as stated above. Follow-up per routine and as warranted. Plan of care reviewed by Dr. Jose Angel Matson DO.   Electronically signed by AYAKA Mejia Asa, NP on 6/7/2019 at 12:44 PM

## 2019-06-18 ENCOUNTER — OUTSIDE SERVICES (OUTPATIENT)
Dept: FAMILY MEDICINE CLINIC | Age: 79
End: 2019-06-18
Payer: MEDICARE

## 2019-06-18 DIAGNOSIS — I10 ESSENTIAL HYPERTENSION: ICD-10-CM

## 2019-06-18 DIAGNOSIS — I69.320 CVA, OLD, APHASIA: ICD-10-CM

## 2019-06-18 DIAGNOSIS — I48.20 CHRONIC ATRIAL FIBRILLATION (HCC): Chronic | ICD-10-CM

## 2019-06-18 DIAGNOSIS — E78.00 PURE HYPERCHOLESTEROLEMIA: ICD-10-CM

## 2019-06-18 DIAGNOSIS — K21.9 GASTROESOPHAGEAL REFLUX DISEASE WITHOUT ESOPHAGITIS: ICD-10-CM

## 2019-06-18 DIAGNOSIS — E11.69 DIABETES MELLITUS TYPE 2 IN OBESE (HCC): ICD-10-CM

## 2019-06-18 DIAGNOSIS — E66.9 DIABETES MELLITUS TYPE 2 IN OBESE (HCC): ICD-10-CM

## 2019-06-18 DIAGNOSIS — Z86.73 HISTORY OF CARDIOEMBOLIC CEREBROVASCULAR ACCIDENT (CVA): Primary | ICD-10-CM

## 2019-06-18 PROCEDURE — 99309 SBSQ NF CARE MODERATE MDM 30: CPT | Performed by: NURSE PRACTITIONER

## 2019-06-18 NOTE — PATIENT INSTRUCTIONS
[address] Progress Note    NAME: Kevin Noguera  DATE: 19  ROOM #: B 27-2  : 1940  REASON FOR VISIT: Other (Routine visit)    CODE STATUS: Full Code    History obtained from chart review, the patient and staff. SUBJECTIVE:  HPI: Kevin Noguera is a 70 y.o. female. Patient has a PMH significant for:  Past Medical History:   Diagnosis Date    Atrial fibrillation ([facility]     CAD (coronary artery disease)     Cerebrovascular disease     Depression     Hyperlipidemia     Hypertension     Self-care deficit for dressing and grooming     Self-care deficit for medication administration     Type II or unspecified type diabetes mellitus without mention of complication, not stated as uncontrolled     Unspecified cerebral artery occlusion with cerebral infarction        Pt seen and examined at bedside today and is noted to be in no acute distress. Staff provide appropriate updates; appreciate staff input. I have reviewed patients past medical, surgical, social, and family history and have made updates where appropriate. Allergies and Medications were reviewed through the [facility] EMR.     Patient Active Problem List    Diagnosis Date Noted    Sepsis due to urinary tract infection ([address] 2013     Priority: High     Class: Acute     replace inactive diagnosis      Delirium 2013     Priority: High     Class: Acute    Diarrhea in adult patient 2013     Priority: High     Class: Acute    Chronic atrial fibrillation ([address] 2019    Acute blood loss anemia 2019    GIB (gastrointestinal bleeding) 2019    DENIS (dyspnea on exertion) 2019    SBO [facility] bowel obstruction) ([facility]) 2018    Spastic hemiplegia of right dominant side due to nontraumatic intraparenchymal hemorrhage of brain ([address] 2018    BCC (basal cell carcinoma), arm, left 2017    Recurrent major depressive disorder, in partial remission ([address] 2017    Ambulatory dysfunction     Bleeding on Coumadin     CVA, old, aphasia     Hemarthrosis of left knee 04/13/2015    Nausea and vomiting 04/13/2015    Diabetes mellitus type 2 in obese ([facility] 04/13/2015    Leukocytosis 04/13/2015    Iron deficiency anemia 01/29/2015    Medication monitoring encounter 01/29/2015    Ileus ([facility] 12/15/2014    Abdominal pain 12/14/2014    Leukocytosis 12/14/2014    Pleural effusion 12/14/2014    History of cardioembolic cerebrovascular accident (CVA) 12/14/2014    Anemia     Depression 01/21/2014    Skin neoplasm 07/11/2013    Myxoma of heart 93/61/0384    Folliculitis, lt face.  05/17/2012    OAB (overactive bladder) 01/23/2012    Urinary incontinence 01/23/2012    S/P stroke due to cerebrovascular disease 10/21/2011    Hemiplegia affecting right dominant side ([facility] [facility] 10/21/2011    Aphasia as late effect of cerebrovascular accident 10/21/2011    HTN (hypertension) 10/12/2011    Hyperlipidemia 10/12/2011    OA (osteoarthritis), multiple joints 10/12/2011    GERD (gastroesophageal reflux disease) 10/12/2011    S/P TKR (total knee replacement), bilateral 10/12/2011       REVIEW OF SYSTEMS  Positive responses are highlighted in bold  Constitutional:  Fever, Chills, Night Sweats, Fatigue, Unexpected changes in weight  HENT:  Ear pain, Tinnitus, Nosebleeds, Trouble swallowing, Hearing loss, Sore throat  Cardiovascular:  Chest Pain, Palpitations, Orthopnea, Paroxysmal Nocturnal Dyspnea  Respiratory:  Cough, Wheezing, Shortness of breath, Chest tightness, Apnea  Gastrointestinal:  Nausea, Vomiting, Diarrhea, Constipation, Heartburn, Blood in stool  Genitourinary:  Difficulty or painful urination, Flank pain, Change in frequency, Urgency  Skin:  Color change, Rash, Itching, Wound  Musculoskeletal:  Joint pain, Back pain, Gait problems, Joint swelling, Myalgias  Neurological:  Dizziness, Headaches, Presyncope, Numbness, Seizures, Tremors  Endocrine:  Heat Intolerance, Cold Intolerance, Polydipsia, Polyphagia, Polyuria    OBJECTIVE:  PHYSICAL EXAM:  VS:  160/71, 97.3, 66, 18, 245 bs, 93%  Weight in pounds:  227.8 (was 224.6)  Pain: Denies    VS reviewed. General Appearance:  chronically ill and debilitated, in no acute distress  Head: normocephalic and atraumatic  Eyes: conjunctivae and eye lids without erythema  ENT: external ear and ear canal normal bilaterally, nose without deformity, nasal mucosa and turbinates normal without polyps, oropharynx normal, dentition is normal for age, no lip or gum lesions noted  Neck: supple and non-tender without mass, no thyromegaly or thyroid nodules, no cervical lymphadenopathy  Pulmonary/Chest: clear but diminished to auscultation bilaterally- no wheezes, rales or rhonchi, normal air movement, no respiratory distress or retractions, requires no supplemental oxygen  Cardiovascular: normal rate, regular rhythm, normal S1 and S2, no murmurs, rubs, clicks, or gallops, distal pulses intact  Abdomen: soft, non-tender, non-distended, bowel sounds physiologic,  no rebound or guarding, no masses or hernias noted. Liver and spleen without enlargement. Extremities: no cyanosis, clubbing or edema of the lower extremities  Musculoskeletal: No joint swelling or gross deformity , right side hemiplegia. Chronic Right arm and hand edema  Neuro:  Alert, 4/5 strength globally and symmetrically, normal speech, no focal findings or movement disorder noted  Psych:  Normal affect without evidence of depression or anxiety, insight and judgement are intact, memory appears intact  Skin: warm and dry, no rash or erythema    ASSESSMENT & PLAN:    1. Hemiplegia of right dominant side due to noncerebrovascular etiology, unspecified hemiplegia type (HCC)  Chronic, status post cva. Chronic Right arm and hand edema is improved  Continue compression glove and/or sleeve and to elevate right hand as tolerates. Continue BP control, ASA and statin.     2. Aphasia as late effect of cerebrovascular accident  Communicates well with staff. Patient is pleasant and compliant. 3. S/P stroke due to cerebrovascular disease  As per above. Continue Coumadin with dosing. 4. Essential hypertension  BP is at goal with Amlodipine/Benazepril. 5. Pure hypercholesterolemia:  Continue ASA and Statin. 6. Diabetes mellitus type 2 in obese (HCC)  Increasing dosage of Metformin to 500 mg po BID due to elevated blood glucose levels. Continue current dosages of Januvia, Amaryl, Humalin R.    7. Recurrent major depressive disorder, in partial remission ([facility]  Controlled with Citalopram.    8. GERD  Continue Protonix. Disposition:  Assessment and plan as stated above. Follow-up per routine and as warranted. Plan of care reviewed by Dr. Candy Loaiza DO.   Electronically signed by AYAKA Carrero NP on 6/18/2019 at 5:59 PM

## 2019-06-28 NOTE — H&P
Susan Macario MD   lisinopril (PRINIVIL;ZESTRIL) 40 MG tablet TAKE ONE TABLET BY MOUTH ONCE DAILY 11/26/18   Anup Almeida MD   simvastatin (ZOCOR) 20 MG tablet TAKE ONE TABLET BY MOUTH ONCE DAILY AT  NIGHT 11/26/18   Anup Almeida MD   oxybutynin (DITROPAN-XL) 10 MG extended release tablet TAKE ONE TABLET BY MOUTH ONCE DAILY 11/26/18   Anup Almeida MD   pantoprazole (PROTONIX) 40 MG tablet TAKE ONE TABLET BY MOUTH ONCE DAILY 11/26/18   Anup Almeida MD   citalopram (CELEXA) 10 MG tablet TAKE ONE TABLET BY MOUTH ONCE DAILY 11/26/18   Anup Almeida MD   amLODIPine-benazepril (LOTREL) 10-20 MG per capsule TAKE ONE CAPSULE BY MOUTH ONCE DAILY FOR HIGH  BLOOD PRESSURE 10/31/18   Anup Almeida MD   warfarin (COUMADIN) 6 MG tablet Take as directed by [facility] 30 tabs = 30 days 10/25/18   Foye Saint, MD   insulin glargine (LANTUS SOLOSTAR) 100 UNIT/ML injection pen Inject 24 Units into the skin nightly     Historical Provider, MD   glimepiride (AMARYL) 4 MG tablet Take 2 tablets by mouth daily 7/3/18   Anup Almeida MD   RELION SHORT PEN NEEDLES 31G X 8 MM MISC USE TO INJECT ONCE DAILY SUBCUTANEOUSLY 2/6/18   Anup Almeida MD   Ascorbic Acid (VITAMIN C PO) Take 1 tablet by mouth three times a week     Historical Provider, MD   vitamin E 400 UNIT capsule Take 400 Units by mouth daily    Historical Provider, MD   calcium carbonate (OSCAL) 500 MG TABS tablet Take 500 mg by mouth Three times weekly    Historical Provider, MD   oxybutynin (DITROPAN XL) 10 MG CR tablet Take 1 tablet by mouth daily. 1/10/13 1/23/14  Anup Almeida MD   Glucagon HCl, rDNA, (GLUCAGEN HYPOKIT IJ) Inject 1 mg as directed as needed. Indications: Extremely Low Blood Sugar    Historical Provider, MD   glucose blood VI test strips (ONE TOUCH TEST STRIPS) strip Test blood sugar with One Touch Ultra 2 QID and as needed. 11/2/11   Anup Almeida MD   aspirin EC 81 MG EC tablet Take 81 mg by mouth daily.  Indications: Cardiovascular Risk Reduction 04/29/19  1552      K 4.7      CO2 26   BUN 21   CREATININE 1.2   CALCIUM 8.3*     Recent Labs     04/29/19  1552   AST 9   ALT 8*   BILITOT <0.2*   ALKPHOS 67     Recent Labs     04/29/19  1552   INR 2.17*     No results for input(s): Terry Gal in the last 72 hours. Urinalysis:      Lab Results   Component Value Date    NITRU NEGATIVE 09/06/2018    WBCUA 10-15 09/06/2018    WBCUA 2-4 11/29/2011    BACTERIA MODERATE 09/06/2018    RBCUA 0-2 09/06/2018    BLOODU NEGATIVE 09/06/2018    SPECGRAV 1.030 09/06/2018    GLUCOSEU 100 06/01/2016       Intake & Output:  No intake/output data recorded. No intake/output data recorded. Radiology:   XR Acute Abd Series Chest 1 VW    (Results Pending)        DVT prophylaxis: SCDs    Code Status: Prior    PT/OT Eval Status: Not ordered    Disposition:SNF    Active Hospital Problems    Diagnosis Date Noted    GIB (gastrointestinal bleeding) [K92.2] 04/29/2019     ASSESSMENT/PLAN:  1) Acute Blood Loss Anemia 2/2 to GIB bleed  - Pt will be admitted to with telemetry  - H/H low 7.2/25.4  - Will monitor/H Q8 hrs and transfuse as needed  - Hold Coumadin and ASA  - GI consulted  - Pt placed on PPI BID  - Pt will be kept NPO and hydrated with IVF    2) DM2  - Hold oral agent  - Continue with lantus but at a lower dose (12 units. ..she takes 24 units usually) while pt is NPO  - Place pt on SSI    3) Depression  - Continue with celexa    4) HTN  - Continue with lotrel with holding parameters    5) Overactive bladder  - Continue with ditropan    6) H/X CVA/A fib  - Hold ASA and coumadin for now  - Continue with statin    Thank you Gracia Vitale DO for the opportunity to be involved in this patient's care.     Electronically signed by Yvonne Melo MD on 4/29/2019 at 4:52 PM good balance

## 2019-07-10 ENCOUNTER — CLINICAL DOCUMENTATION (OUTPATIENT)
Dept: CASE MANAGEMENT | Age: 79
End: 2019-07-10

## 2019-07-11 NOTE — CARE COORDINATION
[facility] Update     2019    Patient: Talha Ward Patient : 1940   MRN: <[id number]>  Reason for Admission: GIB (Gastrointestinal bleeding)  Discharge Date: 5/3/19 RARS: Readmission Risk Score: 18    Incoming notification received from [facility]:  Patient discharged from [facility] skilled services on  and continues under LTC services with spouse. PAC flow-sheet updated.    Care Transitions Post Acute Facility Update    Care Transitions Interventions  Post Acute Facility:  [facility] Acute Facility Update

## 2019-07-30 ENCOUNTER — OUTSIDE SERVICES (OUTPATIENT)
Dept: FAMILY MEDICINE CLINIC | Age: 79
End: 2019-07-30
Payer: MEDICARE

## 2019-07-30 DIAGNOSIS — E78.00 PURE HYPERCHOLESTEROLEMIA: ICD-10-CM

## 2019-07-30 DIAGNOSIS — D50.8 OTHER IRON DEFICIENCY ANEMIA: ICD-10-CM

## 2019-07-30 DIAGNOSIS — I48.20 CHRONIC ATRIAL FIBRILLATION (HCC): Chronic | ICD-10-CM

## 2019-07-30 DIAGNOSIS — F33.41 RECURRENT MAJOR DEPRESSIVE DISORDER, IN PARTIAL REMISSION (HCC): ICD-10-CM

## 2019-07-30 DIAGNOSIS — E11.69 DIABETES MELLITUS TYPE 2 IN OBESE (HCC): ICD-10-CM

## 2019-07-30 DIAGNOSIS — G81.11 SPASTIC HEMIPLEGIA OF RIGHT DOMINANT SIDE DUE TO NONTRAUMATIC INTRAPARENCHYMAL HEMORRHAGE OF BRAIN (HCC): ICD-10-CM

## 2019-07-30 DIAGNOSIS — K21.9 GASTROESOPHAGEAL REFLUX DISEASE WITHOUT ESOPHAGITIS: ICD-10-CM

## 2019-07-30 DIAGNOSIS — E66.9 DIABETES MELLITUS TYPE 2 IN OBESE (HCC): ICD-10-CM

## 2019-07-30 DIAGNOSIS — R60.0 LOWER LEG EDEMA: ICD-10-CM

## 2019-07-30 DIAGNOSIS — Z86.73 HISTORY OF CARDIOEMBOLIC CEREBROVASCULAR ACCIDENT (CVA): Primary | ICD-10-CM

## 2019-07-30 DIAGNOSIS — I61.9 SPASTIC HEMIPLEGIA OF RIGHT DOMINANT SIDE DUE TO NONTRAUMATIC INTRAPARENCHYMAL HEMORRHAGE OF BRAIN (HCC): ICD-10-CM

## 2019-07-30 DIAGNOSIS — I10 ESSENTIAL HYPERTENSION: ICD-10-CM

## 2019-07-30 PROCEDURE — 99309 SBSQ NF CARE MODERATE MDM 30: CPT | Performed by: NURSE PRACTITIONER

## 2019-07-30 NOTE — PROGRESS NOTES
09/20/2017    Ambulatory dysfunction     Bleeding on Coumadin     CVA, old, aphasia     Hemarthrosis of left knee 04/13/2015    Nausea and vomiting 04/13/2015    Diabetes mellitus type 2 in obese ([facility] [address] 04/13/2015    Leukocytosis 04/13/2015    Iron deficiency anemia 01/29/2015    Medication monitoring encounter 01/29/2015    Ileus ([facility] [address] 12/15/2014    Abdominal pain 12/14/2014    Leukocytosis 12/14/2014    Pleural effusion 12/14/2014    History of cardioembolic cerebrovascular accident (CVA) 12/14/2014    Anemia     Depression 01/21/2014    Skin neoplasm 07/11/2013    Myxoma of heart 70/59/6246    Folliculitis, lt face.  05/17/2012    OAB (overactive bladder) 01/23/2012    Urinary incontinence 01/23/2012    S/P stroke due to cerebrovascular disease 10/21/2011    Hemiplegia affecting right dominant side ([facility] 10/21/2011    Aphasia as late effect of cerebrovascular accident 10/21/2011    HTN (hypertension) 10/12/2011    Hyperlipidemia 10/12/2011    OA (osteoarthritis), multiple joints 10/12/2011    GERD (gastroesophageal reflux disease) 10/12/2011    S/P TKR (total knee replacement), bilateral 10/12/2011       REVIEW OF SYSTEMS  Positive responses are highlighted in bold  Constitutional:  Fever, Chills, Night Sweats, Fatigue, Unexpected changes in weight  HENT:  Ear pain, Tinnitus, Nosebleeds, Trouble swallowing, Hearing loss, Sore throat  Cardiovascular:  Chest Pain, Palpitations, Orthopnea, Paroxysmal Nocturnal Dyspnea  Respiratory:  Cough, Wheezing, Shortness of breath, Chest tightness, Apnea  Gastrointestinal:  Nausea, Vomiting, Diarrhea, Constipation, Heartburn, Blood in stool  Genitourinary:  Difficulty or painful urination, Flank pain, Change in frequency, Urgency  Skin:  Color change, Rash, Itching, Wound  Musculoskeletal:  Joint pain, Back pain, Gait problems, Joint swelling, Myalgias  Neurological:  Dizziness, Headaches, Presyncope, Numbness, Seizures, Tremors  Endocrine:  Heat PT/INRs. No s/s of bleeding. 3. Essential hypertension  BP is adequately controlled with Carvedilol 6.25 mg po twice daily, Amlodipine-Besy-Benzapril, 10-40 mg po once daily. SBPs typically run between 140s to 160s. HRs typically run in the 60s to 70s. Will hold off on adjusting BP medications at this time, since Lasix 20 mg po once daily will be initiated for edema. Patient is asymptomatic of hypertension. 4. Spastic hemiplegia of right dominant side due to nontraumatic intraparenchymal hemorrhage of brain [facility])  Staff report no muscle spasms. 5. Diabetes mellitus type 2 in obese (HCC)  Blood glucose levels are typically under 200s. 6/20/19 A1C is 7.1/157. Continue Januvia 100 mg po once daily, Humulin R sliding scale, Basaglar 40 units sq daily at HS, Metformin 500 mg po twice daily. Continue blood glucose monitoring. Continue carb controlled diet. Will adjust diabetid medications as warranted. 6. Other iron deficiency anemia  Continue Ferrous Sulfate 325 mg po once daily - home medication. No recent iron studies. 7. Recurrent major depressive disorder, in partial remission [facility])  Patient is pleasant and compliant. No SI/HI. Enjoys activities. Communicates well with staff. Continue Citalopram 10 mg po once daily. 8. Pure hypercholesterolemia  Controlled with Simvastatin 20 mg po once daily. No ASA - history of GI bleed. Labs per routine are unremarkable. 9. Gastroesophageal reflux disease without esophagitis  Continue Protonix, Tums. Patient is asymptomatic of GERD. 10. Lower leg edema  Will initiate Lasix, 20 mg po once daily for BLE. Weight was 229.4 on 7/1 and is 237.6 today. Patient is asymptomatic. No sob, cough, chest congestion. Is compliant with wearing bilateral knee-high IVANIA hose  Is non-compliant with wearing right arm compression for RUE edema. BMP on Friday. Continue daily weights, low sodium diet. Disposition:  Assessment and plan as stated above.

## 2019-08-02 ENCOUNTER — OUTSIDE SERVICES (OUTPATIENT)
Dept: FAMILY MEDICINE CLINIC | Age: 79
End: 2019-08-02

## 2019-08-02 ENCOUNTER — ANTI-COAG VISIT (OUTPATIENT)
Dept: FAMILY MEDICINE CLINIC | Age: 79
End: 2019-08-02
Payer: MEDICARE

## 2019-08-02 VITALS
TEMPERATURE: 98 F | OXYGEN SATURATION: 93 % | HEART RATE: 77 BPM | SYSTOLIC BLOOD PRESSURE: 140 MMHG | RESPIRATION RATE: 16 BRPM | DIASTOLIC BLOOD PRESSURE: 80 MMHG

## 2019-08-02 DIAGNOSIS — F33.41 RECURRENT MAJOR DEPRESSIVE DISORDER, IN PARTIAL REMISSION (HCC): ICD-10-CM

## 2019-08-02 DIAGNOSIS — Z86.73 HISTORY OF CARDIOEMBOLIC CEREBROVASCULAR ACCIDENT (CVA): Primary | ICD-10-CM

## 2019-08-02 DIAGNOSIS — I48.20 CHRONIC ATRIAL FIBRILLATION (HCC): Primary | ICD-10-CM

## 2019-08-02 DIAGNOSIS — E11.69 DIABETES MELLITUS TYPE 2 IN OBESE (HCC): ICD-10-CM

## 2019-08-02 DIAGNOSIS — D50.8 OTHER IRON DEFICIENCY ANEMIA: ICD-10-CM

## 2019-08-02 DIAGNOSIS — E78.00 PURE HYPERCHOLESTEROLEMIA: ICD-10-CM

## 2019-08-02 DIAGNOSIS — E66.9 DIABETES MELLITUS TYPE 2 IN OBESE (HCC): ICD-10-CM

## 2019-08-02 DIAGNOSIS — I69.320 CVA, OLD, APHASIA: ICD-10-CM

## 2019-08-02 DIAGNOSIS — I61.9 SPASTIC HEMIPLEGIA OF RIGHT DOMINANT SIDE DUE TO NONTRAUMATIC INTRAPARENCHYMAL HEMORRHAGE OF BRAIN (HCC): ICD-10-CM

## 2019-08-02 DIAGNOSIS — G81.11 SPASTIC HEMIPLEGIA OF RIGHT DOMINANT SIDE DUE TO NONTRAUMATIC INTRAPARENCHYMAL HEMORRHAGE OF BRAIN (HCC): ICD-10-CM

## 2019-08-02 DIAGNOSIS — I48.20 CHRONIC ATRIAL FIBRILLATION (HCC): Chronic | ICD-10-CM

## 2019-08-02 DIAGNOSIS — K21.9 GASTROESOPHAGEAL REFLUX DISEASE WITHOUT ESOPHAGITIS: ICD-10-CM

## 2019-08-02 DIAGNOSIS — R60.0 LOWER LEG EDEMA: ICD-10-CM

## 2019-08-02 DIAGNOSIS — I10 ESSENTIAL HYPERTENSION: ICD-10-CM

## 2019-08-02 LAB
INR BLD: 2.4
PROTIME: 25.9 SECONDS

## 2019-08-02 PROCEDURE — 93793 ANTICOAG MGMT PT WARFARIN: CPT | Performed by: NURSE PRACTITIONER

## 2019-08-02 PROCEDURE — 85610 PROTHROMBIN TIME: CPT | Performed by: NURSE PRACTITIONER

## 2019-08-02 NOTE — PROGRESS NOTES
([facility] 09/20/2017    Ambulatory dysfunction     Bleeding on Coumadin     CVA, old, aphasia     Hemarthrosis of left knee 04/13/2015    Nausea and vomiting 04/13/2015    Diabetes mellitus type 2 in obese ([facility] 04/13/2015    Leukocytosis 04/13/2015    Iron deficiency anemia 01/29/2015    Medication monitoring encounter 01/29/2015    Ileus ([facility] 12/15/2014    Abdominal pain 12/14/2014    Leukocytosis 12/14/2014    Pleural effusion 12/14/2014    History of cardioembolic cerebrovascular accident (CVA) 12/14/2014    Anemia     Depression 01/21/2014    Skin neoplasm 07/11/2013    Myxoma of heart 04/56/1119    Folliculitis, lt face.  05/17/2012    OAB (overactive bladder) 01/23/2012    Urinary incontinence 01/23/2012    S/P stroke due to cerebrovascular disease 10/21/2011    Hemiplegia affecting right dominant side ([facility] 10/21/2011    Aphasia as late effect of cerebrovascular accident 10/21/2011    HTN (hypertension) 10/12/2011    Hyperlipidemia 10/12/2011    OA (osteoarthritis), multiple joints 10/12/2011    GERD (gastroesophageal reflux disease) 10/12/2011    S/P TKR (total knee replacement), bilateral 10/12/2011       REVIEW OF SYSTEMS  Positive responses are highlighted in bold  Constitutional:  Fever, Chills, Night Sweats, Fatigue, Unexpected changes in weight  HENT:  Ear pain, Tinnitus, Nosebleeds, Trouble swallowing, Hearing loss, Sore throat  Cardiovascular:  Chest Pain, Palpitations, Orthopnea, Paroxysmal Nocturnal Dyspnea  Respiratory:  Cough, Wheezing, Shortness of breath, Chest tightness, Apnea  Gastrointestinal:  Nausea, Vomiting, Diarrhea, Constipation, Heartburn, Blood in stool  Genitourinary:  Difficulty or painful urination, Flank pain, Change in frequency, Urgency  Skin:  Color change, Rash, Itching, Wound  Musculoskeletal:  Joint pain, Back pain, Gait problems, Joint swelling, Myalgias  Neurological:  Dizziness, Headaches, Presyncope, Numbness, Seizures, Tremors  Endocrine:

## 2019-08-07 ENCOUNTER — ANTI-COAG VISIT (OUTPATIENT)
Dept: FAMILY MEDICINE CLINIC | Age: 79
End: 2019-08-07
Payer: MEDICARE

## 2019-08-07 DIAGNOSIS — I48.20 CHRONIC ATRIAL FIBRILLATION (HCC): Primary | ICD-10-CM

## 2019-08-07 LAB
INR BLD: 2
PROTIME: 21.9 SECONDS

## 2019-08-07 PROCEDURE — 93793 ANTICOAG MGMT PT WARFARIN: CPT | Performed by: NURSE PRACTITIONER

## 2019-08-07 PROCEDURE — 85610 PROTHROMBIN TIME: CPT | Performed by: NURSE PRACTITIONER

## 2019-08-21 ENCOUNTER — ANTI-COAG VISIT (OUTPATIENT)
Dept: FAMILY MEDICINE CLINIC | Age: 79
End: 2019-08-21
Payer: MEDICARE

## 2019-08-21 DIAGNOSIS — I48.20 CHRONIC ATRIAL FIBRILLATION (HCC): Primary | ICD-10-CM

## 2019-08-21 LAB
INR BLD: 3
PROTIME: 32.1 SECONDS

## 2019-08-21 PROCEDURE — 85610 PROTHROMBIN TIME: CPT | Performed by: NURSE PRACTITIONER

## 2019-08-21 PROCEDURE — 93793 ANTICOAG MGMT PT WARFARIN: CPT | Performed by: NURSE PRACTITIONER

## 2019-09-11 ENCOUNTER — ANTI-COAG VISIT (OUTPATIENT)
Dept: FAMILY MEDICINE CLINIC | Age: 79
End: 2019-09-11
Payer: MEDICARE

## 2019-09-11 DIAGNOSIS — I48.20 CHRONIC ATRIAL FIBRILLATION (HCC): Primary | ICD-10-CM

## 2019-09-11 LAB
INR BLD: 2.4
PROTIME: 25.7 SECONDS

## 2019-09-11 PROCEDURE — 85610 PROTHROMBIN TIME: CPT | Performed by: NURSE PRACTITIONER

## 2019-09-11 PROCEDURE — 93793 ANTICOAG MGMT PT WARFARIN: CPT | Performed by: NURSE PRACTITIONER

## 2019-09-11 PROCEDURE — 36415 COLL VENOUS BLD VENIPUNCTURE: CPT | Performed by: NURSE PRACTITIONER

## 2019-09-12 ENCOUNTER — TELEPHONE (OUTPATIENT)
Dept: PHARMACY | Age: 79
End: 2019-09-12

## 2019-09-12 NOTE — TELEPHONE ENCOUNTER
Call made to [facility]. Spoke with patient's nurse who states patient is still a resident there will no anticipated discharge date. Anticipates patient will be there long term. Patient has been in an ECF for several months, not seen in this clinic since February 2019. Discharging patient from clinic. Letter sent to Dr. Yoshi Bhatt, her referring physician, informing him of the discharge and why. Would be happy to take patient back into clinic if ever discharged to home.

## 2019-09-16 ENCOUNTER — OUTSIDE SERVICES (OUTPATIENT)
Dept: FAMILY MEDICINE CLINIC | Age: 79
End: 2019-09-16
Payer: MEDICARE

## 2019-09-16 VITALS
BODY MASS INDEX: 41.79 KG/M2 | RESPIRATION RATE: 16 BRPM | OXYGEN SATURATION: 92 % | TEMPERATURE: 97.9 F | HEART RATE: 76 BPM | DIASTOLIC BLOOD PRESSURE: 84 MMHG | SYSTOLIC BLOOD PRESSURE: 148 MMHG | WEIGHT: 228.5 LBS

## 2019-09-16 DIAGNOSIS — I10 ESSENTIAL HYPERTENSION: ICD-10-CM

## 2019-09-16 DIAGNOSIS — E66.9 DIABETES MELLITUS TYPE 2 IN OBESE (HCC): ICD-10-CM

## 2019-09-16 DIAGNOSIS — D50.8 OTHER IRON DEFICIENCY ANEMIA: ICD-10-CM

## 2019-09-16 DIAGNOSIS — I69.320 CVA, OLD, APHASIA: ICD-10-CM

## 2019-09-16 DIAGNOSIS — E11.69 DIABETES MELLITUS TYPE 2 IN OBESE (HCC): ICD-10-CM

## 2019-09-16 DIAGNOSIS — E78.00 PURE HYPERCHOLESTEROLEMIA: ICD-10-CM

## 2019-09-16 DIAGNOSIS — I48.20 CHRONIC ATRIAL FIBRILLATION (HCC): Primary | ICD-10-CM

## 2019-09-16 DIAGNOSIS — K21.9 GASTROESOPHAGEAL REFLUX DISEASE WITHOUT ESOPHAGITIS: ICD-10-CM

## 2019-09-16 DIAGNOSIS — F33.41 RECURRENT MAJOR DEPRESSIVE DISORDER, IN PARTIAL REMISSION (HCC): ICD-10-CM

## 2019-09-16 PROCEDURE — 99309 SBSQ NF CARE MODERATE MDM 30: CPT | Performed by: NURSE PRACTITIONER

## 2019-09-16 RX ORDER — AMLODIPINE BESYLATE AND BENAZEPRIL HYDROCHLORIDE 10; 40 MG/1; MG/1
1 CAPSULE ORAL DAILY
COMMUNITY

## 2019-09-16 RX ORDER — WARFARIN SODIUM 5 MG/1
5 TABLET ORAL
COMMUNITY
Start: 2020-10-03

## 2019-09-16 RX ORDER — FUROSEMIDE 20 MG/1
20 TABLET ORAL 2 TIMES DAILY
COMMUNITY

## 2019-09-16 RX ORDER — TRIAMCINOLONE ACETONIDE 1 MG/G
CREAM TOPICAL 2 TIMES DAILY PRN
COMMUNITY
End: 2020-11-10

## 2019-09-16 ASSESSMENT — ENCOUNTER SYMPTOMS
SHORTNESS OF BREATH: 0
VOMITING: 0
RHINORRHEA: 0
DIARRHEA: 0
NAUSEA: 0
EYE REDNESS: 0
COUGH: 0
SORE THROAT: 0
WHEEZING: 0
CONSTIPATION: 0
ROS SKIN COMMENTS: DRY SKIN

## 2019-09-16 NOTE — PROGRESS NOTES
TEST STRIPS) strip Test blood sugar with One Touch Ultra 2 QID and as needed. 100 strip 5     No current facility-administered medications for this visit. Allergies   Allergen Reactions    Papaya Derivatives Hives    Pcn [Penicillins] Hives       Health Maintenance   Topic Date Due    DTaP/Tdap/Td vaccine (1 - Tdap) 09[birth year]07/1961    Shingles Vaccine (1 of 2) 09/07/1992    Pneumococcal 65+ years Vaccine (2 of 2 - PPSV23) 07/18/2018    Annual Wellness Visit (AWV)  05/29/2019    Flu vaccine (1) 09/01/2019    Potassium monitoring  05/03/2020    Creatinine monitoring  05/03/2020    DEXA (modify frequency per FRAX score)  Addressed       Subjective:      Review of Systems   Constitutional: Negative for chills, fatigue, fever and unexpected weight change. HENT: Negative for congestion, rhinorrhea and sore throat. Eyes: Negative for redness and visual disturbance. Respiratory: Negative for cough, shortness of breath and wheezing. Cardiovascular: Positive for leg swelling. Negative for chest pain. Gastrointestinal: Negative for constipation, diarrhea, nausea and vomiting. Endocrine: Negative for polydipsia and polyuria. Genitourinary: Negative for dysuria, frequency and hematuria. Musculoskeletal: Negative for arthralgias, gait problem and myalgias. Skin: Negative for rash and wound. Dry skin     Allergic/Immunologic: Negative for environmental allergies and immunocompromised state. Neurological: Positive for speech difficulty and weakness. Negative for dizziness, light-headedness and headaches. Hematological: Does not bruise/bleed easily. Psychiatric/Behavioral: Positive for decreased concentration. Negative for dysphoric mood and sleep disturbance. The patient is not nervous/anxious. Objective:     Physical Exam   Constitutional: She appears well-developed and well-nourished. No distress. HENT:   Head: Normocephalic and atraumatic.    Right Ear: External ear normal. Left Ear: External ear normal.   Nose: Nose normal.   Mouth/Throat: Oropharynx is clear and moist and mucous membranes are normal.   Eyes: Conjunctivae and EOM are normal. Right eye exhibits no discharge. Left eye exhibits no discharge. No scleral icterus. Neck: Neck supple. No JVD present. No thyromegaly present. Cardiovascular: Normal rate, normal heart sounds and intact distal pulses. A regularly irregular rhythm present. Pulmonary/Chest: Effort normal. No stridor. No respiratory distress. She has decreased breath sounds. She has no wheezes. She has no rales. Abdominal: Soft. Bowel sounds are normal. She exhibits no distension. There is no tenderness. Musculoskeletal: She exhibits edema and deformity (R arm hemiparesis). Right shoulder: She exhibits no tenderness, no bony tenderness and no pain. Left shoulder: She exhibits no tenderness, no bony tenderness and no pain. Right wrist: She exhibits decreased range of motion. Right knee: She exhibits decreased range of motion. Cervical back: She exhibits no tenderness, no bony tenderness and no pain. Thoracic back: She exhibits no tenderness, no bony tenderness, no pain and no spasm. Lumbar back: She exhibits no tenderness, no bony tenderness and no pain. Lymphadenopathy:     She has no cervical adenopathy. Right: No supraclavicular adenopathy present. Left: No supraclavicular adenopathy present. Neurological: She is alert. She is disoriented. She displays atrophy. She exhibits abnormal muscle tone. She displays no seizure activity. Skin: Skin is warm, dry and intact. No rash noted. No erythema. Psychiatric: Her behavior is normal. Her affect is labile. Her speech is tangential. Cognition and memory are impaired. Nursing note and vitals reviewed.     BP (!) 148/84   Pulse 76   Temp 97.9 °F (36.6 °C)   Resp 16   Wt 228 lb 8 oz (103.6 kg)   LMP  (Exact Date)   SpO2 92%   BMI 41.79

## 2019-10-21 ENCOUNTER — OUTSIDE SERVICES (OUTPATIENT)
Dept: FAMILY MEDICINE CLINIC | Age: 79
End: 2019-10-21
Payer: MEDICARE

## 2019-10-21 VITALS
RESPIRATION RATE: 18 BRPM | TEMPERATURE: 98.2 F | OXYGEN SATURATION: 95 % | SYSTOLIC BLOOD PRESSURE: 134 MMHG | WEIGHT: 227.5 LBS | DIASTOLIC BLOOD PRESSURE: 66 MMHG | HEART RATE: 76 BPM | BODY MASS INDEX: 41.61 KG/M2

## 2019-10-21 DIAGNOSIS — F33.41 RECURRENT MAJOR DEPRESSIVE DISORDER, IN PARTIAL REMISSION (HCC): ICD-10-CM

## 2019-10-21 DIAGNOSIS — E66.9 DIABETES MELLITUS TYPE 2 IN OBESE (HCC): ICD-10-CM

## 2019-10-21 DIAGNOSIS — D50.8 OTHER IRON DEFICIENCY ANEMIA: ICD-10-CM

## 2019-10-21 DIAGNOSIS — I69.320 CVA, OLD, APHASIA: ICD-10-CM

## 2019-10-21 DIAGNOSIS — E11.69 DIABETES MELLITUS TYPE 2 IN OBESE (HCC): ICD-10-CM

## 2019-10-21 DIAGNOSIS — I10 ESSENTIAL HYPERTENSION: ICD-10-CM

## 2019-10-21 DIAGNOSIS — E78.00 PURE HYPERCHOLESTEROLEMIA: ICD-10-CM

## 2019-10-21 DIAGNOSIS — K21.9 GASTROESOPHAGEAL REFLUX DISEASE WITHOUT ESOPHAGITIS: ICD-10-CM

## 2019-10-21 DIAGNOSIS — I48.20 CHRONIC ATRIAL FIBRILLATION (HCC): Primary | ICD-10-CM

## 2019-10-21 LAB
INR BLD: 2.7
PROTIME: 26.7 SECONDS

## 2019-10-21 PROCEDURE — 99309 SBSQ NF CARE MODERATE MDM 30: CPT | Performed by: NURSE PRACTITIONER

## 2019-10-21 ASSESSMENT — ENCOUNTER SYMPTOMS
ROS SKIN COMMENTS: DRY SKIN
WHEEZING: 0
DIARRHEA: 0
COUGH: 0
SORE THROAT: 0
SHORTNESS OF BREATH: 0
EYE REDNESS: 0
CONSTIPATION: 0
RHINORRHEA: 0
NAUSEA: 0
VOMITING: 0
BLOOD IN STOOL: 1

## 2019-10-22 ENCOUNTER — OUTSIDE SERVICES (OUTPATIENT)
Dept: FAMILY MEDICINE CLINIC | Age: 79
End: 2019-10-22
Payer: MEDICARE

## 2019-10-22 DIAGNOSIS — I69.320 CVA, OLD, APHASIA: ICD-10-CM

## 2019-10-22 DIAGNOSIS — I48.20 CHRONIC ATRIAL FIBRILLATION (HCC): Chronic | ICD-10-CM

## 2019-10-22 PROCEDURE — 85610 PROTHROMBIN TIME: CPT | Performed by: NURSE PRACTITIONER

## 2019-10-22 PROCEDURE — 93793 ANTICOAG MGMT PT WARFARIN: CPT | Performed by: NURSE PRACTITIONER

## 2019-10-22 PROCEDURE — 36415 COLL VENOUS BLD VENIPUNCTURE: CPT | Performed by: NURSE PRACTITIONER

## 2019-11-08 ENCOUNTER — OUTSIDE SERVICES (OUTPATIENT)
Dept: FAMILY MEDICINE CLINIC | Age: 79
End: 2019-11-08
Payer: MEDICARE

## 2019-11-08 VITALS
DIASTOLIC BLOOD PRESSURE: 66 MMHG | WEIGHT: 229 LBS | SYSTOLIC BLOOD PRESSURE: 152 MMHG | HEART RATE: 69 BPM | OXYGEN SATURATION: 92 % | BODY MASS INDEX: 41.88 KG/M2 | TEMPERATURE: 98.3 F | RESPIRATION RATE: 18 BRPM

## 2019-11-08 DIAGNOSIS — N73.9 ABSCESS OF FEMALE GENITALIA: Primary | ICD-10-CM

## 2019-11-08 PROCEDURE — 99308 SBSQ NF CARE LOW MDM 20: CPT | Performed by: NURSE PRACTITIONER

## 2019-11-20 ENCOUNTER — OUTSIDE SERVICES (OUTPATIENT)
Dept: FAMILY MEDICINE CLINIC | Age: 79
End: 2019-11-20
Payer: MEDICARE

## 2019-11-20 DIAGNOSIS — I69.320 CVA, OLD, APHASIA: Primary | ICD-10-CM

## 2019-11-20 DIAGNOSIS — I48.20 CHRONIC ATRIAL FIBRILLATION (HCC): ICD-10-CM

## 2019-11-20 PROCEDURE — 93793 ANTICOAG MGMT PT WARFARIN: CPT | Performed by: NURSE PRACTITIONER

## 2019-11-22 ENCOUNTER — OUTSIDE SERVICES (OUTPATIENT)
Dept: FAMILY MEDICINE CLINIC | Age: 79
End: 2019-11-22
Payer: MEDICARE

## 2019-11-22 DIAGNOSIS — D62 ACUTE BLOOD LOSS ANEMIA: ICD-10-CM

## 2019-11-22 DIAGNOSIS — R60.0 LOWER LEG EDEMA: ICD-10-CM

## 2019-11-22 DIAGNOSIS — Z86.73 HISTORY OF CARDIOEMBOLIC CEREBROVASCULAR ACCIDENT (CVA): ICD-10-CM

## 2019-11-22 DIAGNOSIS — I69.320 CVA, OLD, APHASIA: ICD-10-CM

## 2019-11-22 DIAGNOSIS — I10 ESSENTIAL HYPERTENSION: Primary | ICD-10-CM

## 2019-11-22 DIAGNOSIS — K21.9 GASTROESOPHAGEAL REFLUX DISEASE WITHOUT ESOPHAGITIS: ICD-10-CM

## 2019-11-22 DIAGNOSIS — T45.515A BLEEDING ON COUMADIN: ICD-10-CM

## 2019-11-22 DIAGNOSIS — E66.9 DIABETES MELLITUS TYPE 2 IN OBESE (HCC): ICD-10-CM

## 2019-11-22 DIAGNOSIS — E11.69 DIABETES MELLITUS TYPE 2 IN OBESE (HCC): ICD-10-CM

## 2019-11-22 DIAGNOSIS — R58 BLEEDING ON COUMADIN: ICD-10-CM

## 2019-11-22 DIAGNOSIS — I48.20 CHRONIC ATRIAL FIBRILLATION (HCC): Chronic | ICD-10-CM

## 2019-11-22 DIAGNOSIS — K92.2 GASTROINTESTINAL HEMORRHAGE, UNSPECIFIED GASTROINTESTINAL HEMORRHAGE TYPE: ICD-10-CM

## 2019-11-22 DIAGNOSIS — E78.00 PURE HYPERCHOLESTEROLEMIA: ICD-10-CM

## 2019-11-22 DIAGNOSIS — F33.41 RECURRENT MAJOR DEPRESSIVE DISORDER, IN PARTIAL REMISSION (HCC): ICD-10-CM

## 2019-11-22 PROCEDURE — 99309 SBSQ NF CARE MODERATE MDM 30: CPT | Performed by: NURSE PRACTITIONER

## 2019-12-13 ENCOUNTER — OUTSIDE SERVICES (OUTPATIENT)
Dept: FAMILY MEDICINE CLINIC | Age: 79
End: 2019-12-13
Payer: MEDICARE

## 2019-12-13 VITALS
SYSTOLIC BLOOD PRESSURE: 130 MMHG | HEART RATE: 66 BPM | DIASTOLIC BLOOD PRESSURE: 70 MMHG | TEMPERATURE: 98.2 F | WEIGHT: 228 LBS | RESPIRATION RATE: 16 BRPM | BODY MASS INDEX: 41.7 KG/M2 | OXYGEN SATURATION: 92 %

## 2019-12-13 DIAGNOSIS — D62 ACUTE BLOOD LOSS ANEMIA: ICD-10-CM

## 2019-12-13 DIAGNOSIS — K21.9 GASTROESOPHAGEAL REFLUX DISEASE WITHOUT ESOPHAGITIS: ICD-10-CM

## 2019-12-13 DIAGNOSIS — I69.320 CVA, OLD, APHASIA: ICD-10-CM

## 2019-12-13 DIAGNOSIS — I10 ESSENTIAL HYPERTENSION: ICD-10-CM

## 2019-12-13 DIAGNOSIS — F33.41 RECURRENT MAJOR DEPRESSIVE DISORDER, IN PARTIAL REMISSION (HCC): ICD-10-CM

## 2019-12-13 DIAGNOSIS — E78.00 PURE HYPERCHOLESTEROLEMIA: ICD-10-CM

## 2019-12-13 DIAGNOSIS — I48.20 CHRONIC ATRIAL FIBRILLATION (HCC): Primary | ICD-10-CM

## 2019-12-13 DIAGNOSIS — E11.69 DIABETES MELLITUS TYPE 2 IN OBESE (HCC): ICD-10-CM

## 2019-12-13 DIAGNOSIS — E66.9 DIABETES MELLITUS TYPE 2 IN OBESE (HCC): ICD-10-CM

## 2019-12-13 PROCEDURE — 99309 SBSQ NF CARE MODERATE MDM 30: CPT | Performed by: NURSE PRACTITIONER

## 2019-12-20 ENCOUNTER — OUTSIDE SERVICES (OUTPATIENT)
Dept: FAMILY MEDICINE CLINIC | Age: 79
End: 2019-12-20
Payer: MEDICARE

## 2019-12-20 DIAGNOSIS — I48.20 CHRONIC ATRIAL FIBRILLATION (HCC): Primary | ICD-10-CM

## 2019-12-20 PROCEDURE — 93793 ANTICOAG MGMT PT WARFARIN: CPT | Performed by: NURSE PRACTITIONER

## 2020-01-22 ENCOUNTER — OUTSIDE SERVICES (OUTPATIENT)
Dept: FAMILY MEDICINE CLINIC | Age: 80
End: 2020-01-22
Payer: MEDICARE

## 2020-01-22 VITALS
WEIGHT: 225 LBS | DIASTOLIC BLOOD PRESSURE: 70 MMHG | HEART RATE: 70 BPM | BODY MASS INDEX: 41.15 KG/M2 | TEMPERATURE: 98.1 F | SYSTOLIC BLOOD PRESSURE: 128 MMHG | OXYGEN SATURATION: 88 % | RESPIRATION RATE: 16 BRPM

## 2020-01-22 PROCEDURE — 99309 SBSQ NF CARE MODERATE MDM 30: CPT | Performed by: NURSE PRACTITIONER

## 2020-01-22 NOTE — PROGRESS NOTES
(GLUCAGEN HYPOKIT IJ) Inject 1 mg as directed as needed. Indications: Extremely Low Blood Sugar      glucose blood VI test strips (ONE TOUCH TEST STRIPS) strip Test blood sugar with One Touch Ultra 2 QID and as needed. 100 strip 5     No current facility-administered medications for this visit. Allergies   Allergen Reactions    Papaya Derivatives Hives    Pcn [Penicillins] Hives       Health Maintenance   Topic Date Due    DTaP/Tdap/Td vaccine (1 - Tdap) 09/07/1951    Shingles Vaccine (1 of 2) 09/07/1992    Pneumococcal 65+ years Vaccine (2 of 2 - PPSV23) 07/18/2018    Annual Wellness Visit (AWV)  05/29/2019    Flu vaccine (1) 09/01/2019    Lipid screen  09/04/2019    Potassium monitoring  05/03/2020    Creatinine monitoring  05/03/2020    DEXA (modify frequency per FRAX score)  Addressed       Subjective:      Review of Systems   Unable to perform ROS: Dementia       Objective:     Physical Exam  Vitals signs and nursing note reviewed. Constitutional:       General: She is not in acute distress. Appearance: She is well-developed and overweight. HENT:      Head: Normocephalic and atraumatic. Right Ear: External ear normal.      Left Ear: External ear normal.      Nose: Nose normal.   Eyes:      General: No scleral icterus. Right eye: No discharge. Left eye: No discharge. Conjunctiva/sclera: Conjunctivae normal.   Neck:      Musculoskeletal: Neck supple. Thyroid: No thyromegaly. Vascular: No JVD. Cardiovascular:      Rate and Rhythm: Normal rate and regular rhythm. Heart sounds: Normal heart sounds. Pulmonary:      Effort: Pulmonary effort is normal. No respiratory distress. Breath sounds: Normal breath sounds. No stridor. No wheezing or rales. Abdominal:      General: Bowel sounds are normal. There is no distension. Palpations: Abdomen is soft. Tenderness: There is no tenderness. Musculoskeletal:         General: No deformity. Right shoulder: She exhibits decreased range of motion. She exhibits no tenderness, no bony tenderness and no pain. Left shoulder: She exhibits normal range of motion, no tenderness, no bony tenderness and no pain. Right elbow: She exhibits decreased range of motion. Left elbow: She exhibits normal range of motion. Right wrist: She exhibits decreased range of motion. Left wrist: She exhibits normal range of motion. Cervical back: She exhibits no tenderness, no bony tenderness and no pain. Thoracic back: She exhibits no tenderness, no bony tenderness, no pain and no spasm. Lumbar back: She exhibits no tenderness, no bony tenderness and no pain. Right lower leg: Edema (IVANIA hose on; mild) present. Left lower leg: Edema (IVANIA hose on; mild) present. Lymphadenopathy:      Cervical: No cervical adenopathy. Upper Body:      Right upper body: No supraclavicular adenopathy. Left upper body: No supraclavicular adenopathy. Skin:     General: Skin is warm and dry. Findings: No erythema or rash. Neurological:      Mental Status: She is alert. She is disoriented. Motor: No atrophy, abnormal muscle tone or seizure activity. Psychiatric:         Attention and Perception: Attention normal.         Mood and Affect: Mood normal.         Speech: Speech is tangential.         Behavior: Behavior normal.         Cognition and Memory: Cognition is impaired. Memory is impaired. /70   Pulse 70   Temp 98.1 °F (36.7 °C)   Resp 16   Wt 225 lb (102.1 kg)   LMP  (Exact Date)   SpO2 (!) 88%   BMI 41.15 kg/m²     Assessment:       Diagnosis Orders   1. Chronic atrial fibrillation     2. CVA, old, aphasia     3. Essential hypertension     4. Diabetes mellitus type 2 in obese ([address]     5. Acute blood loss anemia     6. Recurrent major depressive disorder, in partial remission ([address]     7. Pure hypercholesterolemia     8.  Gastroesophageal reflux disease

## 2020-02-19 ENCOUNTER — OUTSIDE SERVICES (OUTPATIENT)
Dept: FAMILY MEDICINE CLINIC | Age: 80
End: 2020-02-19
Payer: MEDICARE

## 2020-02-19 VITALS
SYSTOLIC BLOOD PRESSURE: 130 MMHG | WEIGHT: 229 LBS | DIASTOLIC BLOOD PRESSURE: 72 MMHG | OXYGEN SATURATION: 93 % | TEMPERATURE: 98.1 F | RESPIRATION RATE: 18 BRPM | BODY MASS INDEX: 41.88 KG/M2 | HEART RATE: 72 BPM

## 2020-02-19 PROCEDURE — 99309 SBSQ NF CARE MODERATE MDM 30: CPT | Performed by: NURSE PRACTITIONER

## 2020-02-19 NOTE — PROGRESS NOTES
- Chronic and stable - continue to monitor - continue safety precautions   2. Chronic atrial fibrillation   -Chronic and stable, continue meds and monitor   3. Essential hypertension   -Chronic and stable - continue meds and monitor   4. Gastroesophageal reflux disease without esophagitis   -Chronic and stable - continue meds and monitor   5. Diabetes mellitus type 2 in obese (HCC)   -Chronic - diet and meds per orders - apparently has been eating a lot of chocolate - follow labs   6. Diarrhea in adult patient   -per staff likely due to high chocolate intake - to limit - monitor - call if worsens   7. Aphasia as late effect of cerebrovascular accident   -Chronic and stable - continue safety precautions. Patient seen and examined. History partially obtained by chart review and nursing notes. I have reviewed patient's past medical, surgical, social, and family history and have made updates where appropriate.   See facility EMR for updated medication list.       Electronically signed by AYAKA Dickson CNP on 2/19/2020 at 3:17 PM

## 2020-03-16 ENCOUNTER — OUTSIDE SERVICES (OUTPATIENT)
Dept: FAMILY MEDICINE CLINIC | Age: 80
End: 2020-03-16
Payer: MEDICARE

## 2020-03-16 VITALS
WEIGHT: 229 LBS | BODY MASS INDEX: 41.88 KG/M2 | SYSTOLIC BLOOD PRESSURE: 130 MMHG | HEART RATE: 79 BPM | TEMPERATURE: 99.1 F | RESPIRATION RATE: 20 BRPM | DIASTOLIC BLOOD PRESSURE: 72 MMHG | OXYGEN SATURATION: 92 %

## 2020-03-16 PROBLEM — E66.01 MORBIDLY OBESE (HCC): Status: ACTIVE | Noted: 2020-03-16

## 2020-03-16 PROCEDURE — 99309 SBSQ NF CARE MODERATE MDM 30: CPT | Performed by: NURSE PRACTITIONER

## 2020-03-16 NOTE — PROGRESS NOTES
Ana Izaguirre is a 70 y.o. female who presents today for her medical conditions/complaints as noted below. Chief Complaint   Patient presents with    1 Month Follow-Up           HPI:     Amira Leyva is seen today for monthly f/u of chronic illnesses. She has minimal verbal communication. Her blood sugars have been elevated lately. Orders given to nursing staff. She denies complaints and nursing staff deny concerns. No distress noted.       Past Medical History:   Diagnosis Date    Atrial fibrillation ([address]     CAD (coronary artery disease)     Cerebrovascular disease     Depression     Hyperlipidemia     Hypertension     Self-care deficit for dressing and grooming     Self-care deficit for medication administration 2010    Type II or unspecified type diabetes mellitus without mention of complication, not stated as uncontrolled 2010    Unspecified cerebral artery occlusion with cerebral infarction       Past Surgical History:   Procedure Laterality Date    CARDIAC CATHETERIZATION  2004   [address]    COLONOSCOPY      COLONOSCOPY N/A 5/1/2019    COLONOSCOPY POLYPECTOMY SNARE/COLD BIOPSY performed by Kishan Washington MD at [address] JOINT REPLACEMENT  2008    Lt Total Knee    JOINT REPLACEMENT  2009    Rt Total Knee       Family History   Problem Relation Age of Onset    Cancer Mother         kidney       Social History     Tobacco Use    Smoking status: Never Smoker    Smokeless tobacco: Never Used   Substance Use Topics    Alcohol use: No      Allergies   Allergen Reactions    Papaya Derivatives Hives    Pcn [Penicillins] Hives       Health Maintenance   Topic Date Due    Hepatitis B vaccine (1 of 3 - Risk 3-dose series) 09/07/1959    DTaP/Tdap/Td vaccine (1 - Tdap) 09/07/1961    Shingles Vaccine (1 of 2) 09/07/1992    Pneumococcal 65+ years Vaccine (2 of 2 - PPSV23) 07/18/2018    Annual Wellness Visit (AWV)  05/29/2019    Flu vaccine (1) 09/01/2019    Lipid screen  09/04/2019    Potassium monitoring  05/03/2020    Creatinine monitoring  05/03/2020    DEXA (modify frequency per FRAX score)  Addressed    Hepatitis A vaccine  Aged Out    Hib vaccine  Aged Out    Meningococcal (ACWY) vaccine  Aged Out       Subjective:      Review of Systems   Unable to perform ROS: Patient nonverbal       Objective:     Physical Exam  Vitals signs and nursing note reviewed. Constitutional:       General: She is not in acute distress. Appearance: Normal appearance. She is obese. She is not diaphoretic. HENT:      Head: Normocephalic and atraumatic. Right Ear: External ear normal.      Left Ear: External ear normal.      Nose: Nose normal. No congestion or rhinorrhea. Mouth/Throat:      Mouth: Mucous membranes are moist.      Pharynx: No oropharyngeal exudate. Eyes:      General: No scleral icterus. Right eye: No discharge. Left eye: No discharge. Extraocular Movements: Extraocular movements intact. Conjunctiva/sclera: Conjunctivae normal.   Neck:      Musculoskeletal: Normal range of motion and neck supple. No neck rigidity or muscular tenderness. Cardiovascular:      Rate and Rhythm: Normal rate and regular rhythm. Pulses: Normal pulses. Heart sounds: Normal heart sounds. No murmur. Comments: Distant Heart tones  Pulmonary:      Effort: Pulmonary effort is normal. No respiratory distress. Breath sounds: Normal breath sounds. No stridor. No wheezing, rhonchi or rales. Chest:      Chest wall: No tenderness. Abdominal:      General: Bowel sounds are normal. There is no distension. Palpations: Abdomen is soft. Tenderness: There is no abdominal tenderness. Musculoskeletal:         General: No swelling or tenderness. Right lower leg: No edema. Left lower leg: No edema. Comments: Limited ROM due to hx CVA with right hemeplegia   Skin:     General: Skin is warm and dry. Coloration: Skin is not jaundiced or pale. Neurological:      Mental Status: She is alert. Mental status is at baseline. She is disoriented. /72   Pulse 79   Temp 99.1 °F (37.3 °C)   Resp 20   Wt 229 lb (103.9 kg)   LMP  (Exact Date)   SpO2 92%   BMI 41.88 kg/m²     Assessment/Plan      1. Diabetes mellitus type 2 in obese (HCC)   Blood sugars elevated   Stop Januvia  Start Victoza 0.6 mg SQ Daily for 1 week             Then increase to 1.2 mg SQ daily   May cause increased nausea - may use Zofran 4 mg po q6hr prn. Continue other DM meds and monitor Blood sugars   2. Morbidly obese (HCC)   Limit Calories as able  Increase activity as able   3. Chronic atrial fibrillation   Chronic - rate controlled - continue meds and monitor   4. Essential hypertension   Chronic - continue meds and montior   5. Gastroesophageal reflux disease without esophagitis   Chronic - continue meds and monitor   6. Hemiplegia of right dominant side due to noncerebrovascular etiology, unspecified hemiplegia type ([address]   Chronic - continue safety precaution and stroke prevention   7. Spastic hemiplegia of right dominant side due to nontraumatic intraparenchymal hemorrhage of brain ([address]    8. Primary osteoarthritis involving multiple joints    9. Skin neoplasm - Hx of   10. Myxoma of heart - hx of   11. Hemarthrosis of left knee    12. BCC (basal cell carcinoma), arm, left - Hx of   13. OAB (overactive bladder)    14. Sepsis due to urinary tract infection ([address]    15. Pure hypercholesterolemia    16. Acute blood loss anemia    17. DENIS (dyspnea on exertion)    18. Recurrent major depressive disorder, in partial remission ([address]    19. Bleeding on Coumadin    20. Ambulatory dysfunction    21. Leukocytosis, unspecified type    22. CVA, old, aphasia    21. Other iron deficiency anemia    24. Urge incontinence of urine    25. Aphasia as late effect of cerebrovascular accident          Patient seen and examined.   History

## 2020-03-20 ENCOUNTER — OUTSIDE SERVICES (OUTPATIENT)
Dept: FAMILY MEDICINE CLINIC | Age: 80
End: 2020-03-20
Payer: MEDICARE

## 2020-03-20 VITALS
WEIGHT: 229.5 LBS | HEART RATE: 66 BPM | OXYGEN SATURATION: 92 % | DIASTOLIC BLOOD PRESSURE: 88 MMHG | SYSTOLIC BLOOD PRESSURE: 140 MMHG | RESPIRATION RATE: 16 BRPM | TEMPERATURE: 100.7 F | BODY MASS INDEX: 41.98 KG/M2

## 2020-03-20 PROCEDURE — 99309 SBSQ NF CARE MODERATE MDM 30: CPT | Performed by: NURSE PRACTITIONER

## 2020-03-20 NOTE — PROGRESS NOTES
vaccine (1 of 3 - Risk 3-dose series) 09/07/1961    DTaP/Tdap/Td vaccine (1 - Tdap) 09/07/1961    Shingles Vaccine (1 of 2) 09/07/1992    Pneumococcal 65+ years Vaccine (2 of 2 - PPSV23) 07/18/2018    Annual Wellness Visit (AWV)  05/29/2019    Flu vaccine (1) 09/01/2019    Lipid screen  09/04/2019    Potassium monitoring  05/03/2020    Creatinine monitoring  05/03/2020    DEXA (modify frequency per FRAX score)  Addressed    Hepatitis A vaccine  Aged Out    Hib vaccine  Aged Out    Meningococcal (ACWY) vaccine  Aged Out       Subjective:      Review of Systems   Unable to perform ROS: Patient nonverbal       Objective:     Physical Exam  Vitals signs and nursing note reviewed. Constitutional:       General: She is not in acute distress. Appearance: Normal appearance. She is obese. She is not diaphoretic. HENT:      Head: Normocephalic and atraumatic. Right Ear: External ear normal.      Left Ear: External ear normal.      Nose: Nose normal. No congestion or rhinorrhea. Mouth/Throat:      Mouth: Mucous membranes are moist.      Pharynx: No oropharyngeal exudate. Eyes:      General: No scleral icterus. Right eye: No discharge. Left eye: No discharge. Extraocular Movements: Extraocular movements intact. Conjunctiva/sclera: Conjunctivae normal.   Neck:      Musculoskeletal: Normal range of motion and neck supple. No neck rigidity or muscular tenderness. Cardiovascular:      Rate and Rhythm: Normal rate. Rhythm irregular. Pulses: Normal pulses. Heart sounds: Normal heart sounds. Comments: Distant heart tones  Pulmonary:      Effort: Pulmonary effort is normal. No respiratory distress. Breath sounds: Normal breath sounds. No stridor. No wheezing, rhonchi or rales. Chest:      Chest wall: No tenderness. Abdominal:      General: Bowel sounds are normal. There is no distension. Palpations: Abdomen is soft. Tenderness:  There is no abdominal tenderness. Musculoskeletal:         General: No swelling or tenderness. Right lower leg: Edema present. Left lower leg: Edema present. Comments: Limited ROM due to CVA and wheelchair bound   Skin:     General: Skin is warm and dry. Coloration: Skin is not jaundiced or pale. Neurological:      Mental Status: She is alert. She is disoriented. BP (!) 140/88   Pulse 66   Temp 100.7 °F (38.2 °C)   Resp 16   Wt 229 lb 8 oz (104.1 kg)   LMP  (Exact Date)   SpO2 92%   BMI 41.98 kg/m²     Assessment/Plan      1. Elevated temperature    2. Urinary tract infection without hematuria, site unspecified   UA dip + for large leukocytes, blood and protein - to send for culture  Start Cipro due to hx of sepsis from UTI  Follow   3. Chronic atrial fibrillation    4. Essential hypertension    5. Myxoma of heart    6. Gastroesophageal reflux disease without esophagitis    7. Diabetes mellitus type 2 in obese ([address]    8. Hemiplegia of right dominant side due to noncerebrovascular etiology, unspecified hemiplegia type ([address]    9. Primary osteoarthritis involving multiple joints    10. Sepsis due to urinary tract infection ([address]    11. S/P stroke due to cerebrovascular disease    12. Aphasia as late effect of cerebrovascular accident    15. Urge incontinence of urine    14. Recurrent major depressive disorder, in partial remission ([address]    15. Morbidly obese (HCC)      Chronic illnesses and diseases reviewed. Medications reviewed. No new orders at this time for chronic illnesses. Continue current medications. Please call if needed. Patient seen and examined. History partially obtained by chart review and nursing notes. I have reviewed patient's past medical, surgical, social, and family history and have made updates where appropriate.   See facility EMR for updated medication list.       Electronically signed by AYAKA Man CNP on 3/20/2020 at 9:53 AM

## 2020-03-23 ENCOUNTER — OUTSIDE SERVICES (OUTPATIENT)
Dept: FAMILY MEDICINE CLINIC | Age: 80
End: 2020-03-23
Payer: MEDICARE

## 2020-03-23 VITALS
WEIGHT: 229.5 LBS | OXYGEN SATURATION: 92 % | HEART RATE: 66 BPM | BODY MASS INDEX: 41.98 KG/M2 | RESPIRATION RATE: 16 BRPM | SYSTOLIC BLOOD PRESSURE: 152 MMHG | DIASTOLIC BLOOD PRESSURE: 80 MMHG | TEMPERATURE: 98.7 F

## 2020-03-23 PROCEDURE — 99308 SBSQ NF CARE LOW MDM 20: CPT | Performed by: NURSE PRACTITIONER

## 2020-03-23 ASSESSMENT — ENCOUNTER SYMPTOMS
VOMITING: 0
SORE THROAT: 0
NAUSEA: 0
CONSTIPATION: 0
SHORTNESS OF BREATH: 0
COUGH: 0
DIARRHEA: 0

## 2020-03-23 NOTE — PROGRESS NOTES
Porfirio Pritchard is a 70 y.o. female who presents today for her medical conditions/complaints as noted below. Chief Complaint   Patient presents with    Cystitis           HPI:     Jigna Meredith is seen in her room today. She states she is feeling good today. She was started on Cipro for UTI. She has Enterobacter and at [facility]. She has had clinical improvement and she has had 3 days total and will dc. Current Outpatient Medications   Medication Sig Dispense Refill    insulin regular (HUMULIN R;NOVOLIN R) 100 UNIT/ML injection Inject 6 Units into the skin 2 times daily (before meals)      amLODIPine-benazepril (LOTREL) 10-40 MG per capsule Take 1 capsule by mouth daily      warfarin (COUMADIN) 5 MG tablet Take 5 mg by mouth      furosemide (LASIX) 20 MG tablet Take 20 mg by mouth 2 times daily      triamcinolone (KENALOG) 0.1 % cream Apply topically 2 times daily as needed Apply topically 2 times daily.  hydrocortisone 1 % cream Apply topically 2 times daily Apply topically 2 times daily.       hydrocortisone (ANUSOL-HC) 25 MG suppository Place 1 suppository rectally 2 times daily      insulin glargine (LANTUS SOLOSTAR) 100 UNIT/ML injection pen Inject 20 Units into the skin nightly (Patient taking differently: Inject 48 Units into the skin nightly ) 5 pen 3    carvedilol (COREG) 6.25 MG tablet Take 1 tablet by mouth 2 times daily 60 tablet 3    ferrous sulfate 325 (65 Fe) MG tablet Take 1 tablet by mouth 2 times daily (with meals) 30 tablet 3    docusate sodium (COLACE) 100 MG capsule Take 100 mg by mouth daily      insulin regular (HUMULIN R;NOVOLIN R) 100 UNIT/ML injection Inject into the skin 4 times daily (before meals and nightly) 151-200= 2 units, 201-250= 4 units, 251-300= 6 units, 301-350= 8 units, 351-400= 10 units, 401+= call MD      metFORMIN (GLUCOPHAGE) 500 MG tablet TAKE ONE TABLET BY MOUTH ONCE DAILY WITH BREAKFAST (Patient taking differently: 500 mg 2 times daily (with meals) ) 60 tablet 5    JANUVIA 100 MG tablet TAKE ONE TABLET BY MOUTH ONCE DAILY 90 tablet 3    simvastatin (ZOCOR) 20 MG tablet TAKE ONE TABLET BY MOUTH ONCE DAILY AT  NIGHT (Patient taking differently: 40 mg ) 90 tablet 3    oxybutynin (DITROPAN-XL) 10 MG extended release tablet TAKE ONE TABLET BY MOUTH ONCE DAILY 90 tablet 3    pantoprazole (PROTONIX) 40 MG tablet TAKE ONE TABLET BY MOUTH ONCE DAILY 90 tablet 3    citalopram (CELEXA) 10 MG tablet TAKE ONE TABLET BY MOUTH ONCE DAILY 90 tablet 3    Ascorbic Acid (VITAMIN C PO) Take 1 tablet by mouth three times a week       vitamin E 400 UNIT capsule Take 400 Units by mouth daily      calcium carbonate (OSCAL) 500 MG TABS tablet Take 500 mg by mouth Three times weekly      Glucagon HCl, rDNA, (GLUCAGEN HYPOKIT IJ) Inject 1 mg as directed as needed. Indications: Extremely Low Blood Sugar      glucose blood VI test strips (ONE TOUCH TEST STRIPS) strip Test blood sugar with One Touch Ultra 2 QID and as needed. 100 strip 5     No current facility-administered medications for this visit. Allergies   Allergen Reactions    Papaya Derivatives Hives    Pcn [Penicillins] Hives       Subjective:      Review of Systems   Constitutional: Negative for chills and fever. HENT: Negative for congestion and sore throat. Respiratory: Negative for cough and shortness of breath. Cardiovascular: Negative for chest pain and leg swelling. Gastrointestinal: Negative for constipation, diarrhea, nausea and vomiting. Genitourinary: Negative for dysuria, frequency, hematuria and urgency. Musculoskeletal: Positive for gait problem. Negative for arthralgias and myalgias. Skin: Negative for pallor and rash. Neurological: Negative for weakness. Psychiatric/Behavioral: Positive for confusion.        Objective:     BP (!) 152/80   Pulse 66   Temp 98.7 °F (37.1 °C)   Resp 16   Wt 229 lb 8 oz (104.1 kg)   LMP  (Exact Date)   SpO2 92%   BMI 41.98 kg/m²     Physical

## 2020-03-25 ENCOUNTER — OUTSIDE SERVICES (OUTPATIENT)
Dept: FAMILY MEDICINE CLINIC | Age: 80
End: 2020-03-25
Payer: MEDICARE

## 2020-03-25 PROBLEM — I48.20 CHRONIC A-FIB (HCC): Status: ACTIVE | Noted: 2020-03-25

## 2020-03-25 PROBLEM — I48.91 ATRIAL FIBRILLATION (HCC): Status: ACTIVE | Noted: 2020-03-25

## 2020-03-25 PROCEDURE — 93793 ANTICOAG MGMT PT WARFARIN: CPT | Performed by: PHYSICAL MEDICINE & REHABILITATION

## 2020-03-27 ENCOUNTER — OUTSIDE SERVICES (OUTPATIENT)
Dept: FAMILY MEDICINE CLINIC | Age: 80
End: 2020-03-27
Payer: MEDICARE

## 2020-03-27 PROCEDURE — 93793 ANTICOAG MGMT PT WARFARIN: CPT | Performed by: PHYSICAL MEDICINE & REHABILITATION

## 2020-03-30 ENCOUNTER — OUTSIDE SERVICES (OUTPATIENT)
Dept: FAMILY MEDICINE CLINIC | Age: 80
End: 2020-03-30
Payer: MEDICARE

## 2020-03-30 PROCEDURE — 93793 ANTICOAG MGMT PT WARFARIN: CPT | Performed by: PHYSICAL MEDICINE & REHABILITATION

## 2020-04-15 ENCOUNTER — OUTSIDE SERVICES (OUTPATIENT)
Dept: FAMILY MEDICINE CLINIC | Age: 80
End: 2020-04-15
Payer: MEDICARE

## 2020-04-15 VITALS
DIASTOLIC BLOOD PRESSURE: 80 MMHG | HEART RATE: 62 BPM | TEMPERATURE: 97.5 F | SYSTOLIC BLOOD PRESSURE: 134 MMHG | BODY MASS INDEX: 39.96 KG/M2 | OXYGEN SATURATION: 93 % | WEIGHT: 218.5 LBS | RESPIRATION RATE: 16 BRPM

## 2020-04-15 PROCEDURE — 99490 CHRNC CARE MGMT STAFF 1ST 20: CPT | Performed by: NURSE PRACTITIONER

## 2020-04-15 PROCEDURE — 93793 ANTICOAG MGMT PT WARFARIN: CPT | Performed by: PHYSICAL MEDICINE & REHABILITATION

## 2020-04-15 PROCEDURE — 99309 SBSQ NF CARE MODERATE MDM 30: CPT | Performed by: NURSE PRACTITIONER

## 2020-04-15 NOTE — PROGRESS NOTES
Moira Martinez is a 70 y.o. female who presents today for her medical conditions/complaints as noted below. Chief Complaint   Patient presents with    1 Month Follow-Up           HPI:     Becky Zaidi is seen today for her monthly chronic illness f/u. She is oriented to person only. She denies pain. She is able to follow some commands. Nursing denies concerns. No fevers, cough, or shortness of breath. No distress noted. Blood sugars have been elevated, will address.        Past Medical History:   Diagnosis Date    Atrial fibrillation ([address]     CAD (coronary artery disease)     Cerebrovascular disease     Depression     Hyperlipidemia     Hypertension     Self-care deficit for dressing and grooming     Self-care deficit for medication administration 2010    Type II or unspecified type diabetes mellitus without mention of complication, not stated as uncontrolled 2010    Unspecified cerebral artery occlusion with cerebral infarction       Past Surgical History:   Procedure Laterality Date    CARDIAC CATHETERIZATION  2004   [facility]    COLONOSCOPY      COLONOSCOPY N/A 5/1/2019    COLONOSCOPY POLYPECTOMY SNARE/COLD BIOPSY performed by Sharda Huber MD at [address] JOINT REPLACEMENT  2008    Lt Total Knee    JOINT REPLACEMENT  2009    Rt Total Knee       Family History   Problem Relation Age of Onset    Cancer Mother         kidney       Social History     Tobacco Use    Smoking status: Never Smoker    Smokeless tobacco: Never Used   Substance Use Topics    Alcohol use: No      Allergies   Allergen Reactions    Papaya Derivatives Hives    Pcn [Penicillins] Hives       Health Maintenance   Topic Date Due    DTaP/Tdap/Td vaccine (1 - Tdap) 09/07/1961    Shingles Vaccine (1 of 2) 09/07/1992    Pneumococcal 65+ years Vaccine (2 of 2 - PPSV23) 07/18/2018    Annual Wellness Visit (AWV)  05/29/2019    Lipid screen  09/04/2019    Potassium baseline. She is disoriented. /80   Pulse 62   Temp 97.5 °F (36.4 °C)   Resp 16   Wt 218 lb 8 oz (99.1 kg)   SpO2 93%   BMI 39.96 kg/m²     Assessment/Plan      1. Chronic atrial fibrillation   Rate controlled - continue meds and monitor   2. Atrial fibrillation, unspecified type ([facility]    3. S/P stroke due to cerebrovascular disease    4. Status post total knee replacement, unspecified laterality    5. Morbidly obese ([facility]    6. Recurrent major depressive disorder, in partial remission (HCC)   Chronic - continue meds and monitor   7. DENIS (dyspnea on exertion)    8. Essential hypertension   Chronic - continue meds and monitor   9. Gastroesophageal reflux disease without esophagitis   Chronic - continue meds and monitor   10. Diabetes mellitus type 2 in obese (HCC)   Blood sugars elevated - Increase Basaglar to 50 units daily SQ - continue other meds and monitor   11. Hemiplegia of right dominant side due to noncerebrovascular etiology, unspecified hemiplegia type ([facility])    12. Spastic hemiplegia of right dominant side due to nontraumatic intraparenchymal hemorrhage of brain ([facility]    13. Primary osteoarthritis involving multiple joints    14. BCC (basal cell carcinoma), arm, left    15. Hemarthrosis of left knee    16. Skin neoplasm    17. OAB (overactive bladder)    18. Sepsis due to urinary tract infection ([address]    19. Aphasia as late effect of cerebrovascular accident    21. Urge incontinence of urine    21. Ambulatory dysfunction    22. Lower leg edema    23. Pure hypercholesterolemia      Chronic illnesses and diseases reviewed. Medications reviewed. No other new orders other than what is written at this time. Continue current medications. Please call if needed. Patient seen and examined. History partially obtained by chart review and nursing notes. I have reviewed patient's past medical, surgical, social, and family history and have made updates where appropriate.   See facility EMR for

## 2020-04-22 ENCOUNTER — OUTSIDE SERVICES (OUTPATIENT)
Dept: FAMILY MEDICINE CLINIC | Age: 80
End: 2020-04-22
Payer: MEDICARE

## 2020-04-22 PROCEDURE — 93793 ANTICOAG MGMT PT WARFARIN: CPT | Performed by: PHYSICAL MEDICINE & REHABILITATION

## 2020-04-22 NOTE — PROGRESS NOTES
INR was 2.6 today. Continue current dose of Coumadin of 4.5 mg po q day. Recheck INR in one week, 4/29/2020.

## 2020-04-29 ENCOUNTER — OUTSIDE SERVICES (OUTPATIENT)
Dept: FAMILY MEDICINE CLINIC | Age: 80
End: 2020-04-29
Payer: MEDICARE

## 2020-04-29 PROCEDURE — 93793 ANTICOAG MGMT PT WARFARIN: CPT | Performed by: PHYSICAL MEDICINE & REHABILITATION

## 2020-05-06 ENCOUNTER — OUTSIDE SERVICES (OUTPATIENT)
Dept: FAMILY MEDICINE CLINIC | Age: 80
End: 2020-05-06
Payer: MEDICARE

## 2020-05-06 PROCEDURE — 93793 ANTICOAG MGMT PT WARFARIN: CPT | Performed by: PHYSICAL MEDICINE & REHABILITATION

## 2020-05-13 ENCOUNTER — OUTSIDE SERVICES (OUTPATIENT)
Dept: FAMILY MEDICINE CLINIC | Age: 80
End: 2020-05-13
Payer: MEDICARE

## 2020-05-13 PROCEDURE — 93793 ANTICOAG MGMT PT WARFARIN: CPT | Performed by: NURSE PRACTITIONER

## 2020-05-20 ENCOUNTER — OUTSIDE SERVICES (OUTPATIENT)
Dept: FAMILY MEDICINE CLINIC | Age: 80
End: 2020-05-20
Payer: MEDICARE

## 2020-05-20 VITALS
SYSTOLIC BLOOD PRESSURE: 130 MMHG | RESPIRATION RATE: 16 BRPM | TEMPERATURE: 98.4 F | WEIGHT: 222.5 LBS | HEART RATE: 68 BPM | BODY MASS INDEX: 40.7 KG/M2 | DIASTOLIC BLOOD PRESSURE: 66 MMHG

## 2020-05-20 PROCEDURE — 99490 CHRNC CARE MGMT STAFF 1ST 20: CPT | Performed by: FAMILY MEDICINE

## 2020-05-20 PROCEDURE — 99309 SBSQ NF CARE MODERATE MDM 30: CPT | Performed by: FAMILY MEDICINE

## 2020-05-20 ASSESSMENT — ENCOUNTER SYMPTOMS
SORE THROAT: 0
SHORTNESS OF BREATH: 0
CONSTIPATION: 0
RHINORRHEA: 0
COUGH: 0
EYE DISCHARGE: 0
WHEEZING: 0
ABDOMINAL PAIN: 0
DIARRHEA: 0
NAUSEA: 0

## 2020-05-20 NOTE — PROGRESS NOTES
Family History   Problem Relation Age of Onset    Cancer Mother         kidney         Review of Systems   Constitutional: Negative for chills, fatigue and fever. HENT: Negative for congestion, rhinorrhea and sore throat. Eyes: Negative for discharge and visual disturbance. Respiratory: Negative for cough, shortness of breath and wheezing. Cardiovascular: Negative for chest pain and palpitations. Gastrointestinal: Negative for abdominal pain, constipation, diarrhea and nausea. Genitourinary: Negative for dysuria and hematuria. Musculoskeletal: Negative for arthralgias and myalgias. Neurological: Negative for dizziness and headaches. Psychiatric/Behavioral: Negative for sleep disturbance. The patient is not nervous/anxious. PHYSICAL EXAM:  Vitals:    05/20/20 1243   BP: 130/66   Pulse: 68   Resp: 16   Temp: 98.4 °F (36.9 °C)        Physical Exam  Vitals signs and nursing note reviewed. Exam conducted with a chaperone present. Constitutional:       General: She is not in acute distress. Appearance: Normal appearance. HENT:      Head: Normocephalic and atraumatic. Eyes:      General: No scleral icterus. Comments: Wears glasses   Cardiovascular:      Rate and Rhythm: Normal rate. Pulmonary:      Effort: Pulmonary effort is normal. No respiratory distress. Skin:     Coloration: Skin is not jaundiced or pale. Neurological:      Mental Status: She is alert. Mental status is at baseline. Psychiatric:         Attention and Perception: Attention normal.         Mood and Affect: Mood normal.         Speech: Speech is delayed. Behavior: Behavior normal. Behavior is cooperative. Thought Content: Thought content normal.         Judgment: Judgment normal.      Comments: aphasia          ASSESSMENT & PLAN  Cachorro was seen today for other.     Diagnoses and all orders for this visit:    Chronic atrial fibrillation  -chronic, controlled, anticoag with coumadin and rate controlled with coreg  S/P stroke due to cerebrovascular disease  -cont prevention with statin, coumadin, bp control  Morbidly obese (HCC)  -stable, monitor weight  Recurrent major depressive disorder, in partial remission ([facility] [address]  -chronic controlled on celexa 10mg  Gastroesophageal reflux disease without esophagitis  -chronic, controlled, asymptomatic, on protonix  Diabetes mellitus type 2 in obese ([facility] [address]  -has been running slightly high, 200s. On metformin, basaglar, and ISS. Will get next A1C in June, consider med adjustment at that time  Spastic hemiplegia of right dominant side due to nontraumatic intraparenchymal hemorrhage of brain ([facility] [address]  -able to ambulate with restorative, assistance with adls. Essential hypertension  -chronic, controlled, cont meds  Primary osteoarthritis involving multiple joints  -chronic, tylenol prn, denies pain  Aphasia as late effect of cerebrovascular accident  -chronic      Follow up monthly   Resident has chronic a. Fib, MDD, hx of CVA and it is expected to last 12 or more months. These chronic conditions place resident at a significant risk of death, acute exacerbation, decline in function or functional decline. The patient's comprehensive plan was monitored today. I spent 20 minutes reviewing plan. I have seen and examined the patient virtually and chaperone was present. The history was obtained through the patient, past medical records, and the staff. The patient's chart was updated as appropriate. Please see facility EMR for complete medication reconciliation. Pursuant to the emergency declaration under the [facility] waiver authority and the [employer] Act, this Virtual  Visit was conducted, with patient's consent, to reduce the patient's risk of exposure to COVID-19 and provide continuity of care for an established patient.     Services were provided through a

## 2020-06-10 ENCOUNTER — OUTSIDE SERVICES (OUTPATIENT)
Dept: FAMILY MEDICINE CLINIC | Age: 80
End: 2020-06-10
Payer: MEDICARE

## 2020-06-10 PROCEDURE — 93793 ANTICOAG MGMT PT WARFARIN: CPT | Performed by: NURSE PRACTITIONER

## 2020-06-17 ENCOUNTER — OUTSIDE SERVICES (OUTPATIENT)
Dept: FAMILY MEDICINE CLINIC | Age: 80
End: 2020-06-17

## 2020-06-17 ENCOUNTER — OUTSIDE SERVICES (OUTPATIENT)
Dept: FAMILY MEDICINE CLINIC | Age: 80
End: 2020-06-17
Payer: MEDICARE

## 2020-06-17 VITALS
OXYGEN SATURATION: 90 % | DIASTOLIC BLOOD PRESSURE: 76 MMHG | BODY MASS INDEX: 40.79 KG/M2 | WEIGHT: 223 LBS | SYSTOLIC BLOOD PRESSURE: 134 MMHG | HEART RATE: 71 BPM | TEMPERATURE: 98.3 F | RESPIRATION RATE: 16 BRPM

## 2020-06-17 PROCEDURE — 99490 CHRNC CARE MGMT STAFF 1ST 20: CPT | Performed by: NURSE PRACTITIONER

## 2020-06-17 PROCEDURE — 99309 SBSQ NF CARE MODERATE MDM 30: CPT | Performed by: NURSE PRACTITIONER

## 2020-06-25 ENCOUNTER — OUTSIDE SERVICES (OUTPATIENT)
Dept: FAMILY MEDICINE CLINIC | Age: 80
End: 2020-06-25

## 2020-07-01 ENCOUNTER — OUTSIDE SERVICES (OUTPATIENT)
Dept: FAMILY MEDICINE CLINIC | Age: 80
End: 2020-07-01
Payer: MEDICARE

## 2020-07-01 PROCEDURE — 93793 ANTICOAG MGMT PT WARFARIN: CPT | Performed by: NURSE PRACTITIONER

## 2020-07-15 ENCOUNTER — OUTSIDE SERVICES (OUTPATIENT)
Dept: FAMILY MEDICINE CLINIC | Age: 80
End: 2020-07-15
Payer: MEDICARE

## 2020-07-15 PROCEDURE — 93793 ANTICOAG MGMT PT WARFARIN: CPT | Performed by: NURSE PRACTITIONER

## 2020-07-22 ENCOUNTER — OUTSIDE SERVICES (OUTPATIENT)
Dept: FAMILY MEDICINE CLINIC | Age: 80
End: 2020-07-22
Payer: MEDICARE

## 2020-07-22 VITALS
WEIGHT: 221 LBS | TEMPERATURE: 98.7 F | RESPIRATION RATE: 16 BRPM | HEART RATE: 72 BPM | SYSTOLIC BLOOD PRESSURE: 148 MMHG | OXYGEN SATURATION: 91 % | DIASTOLIC BLOOD PRESSURE: 80 MMHG | BODY MASS INDEX: 40.42 KG/M2

## 2020-07-22 PROCEDURE — 99309 SBSQ NF CARE MODERATE MDM 30: CPT | Performed by: NURSE PRACTITIONER

## 2020-07-22 PROCEDURE — 99490 CHRNC CARE MGMT STAFF 1ST 20: CPT | Performed by: NURSE PRACTITIONER

## 2020-07-22 NOTE — PROGRESS NOTES
Ibeth Boggs is a 70 y.o. female who presents today for her medical conditions/complaints as noted below. Chief Complaint   Patient presents with    1 Month Follow-Up           HPI:     Montrell Estrada is seen today for her monthly chronic illness f/u. She has a PMX of CVA with aphasia, Dementia, A fib on Coumadin, HLD, HTN, Depression, among others. She is sitting in her wheelchair in her room. She denies complaints of pain, shortness of breath or cough. She is alert and very pleasant. No distress is noted. Labs reviewed. Weights stable. No recent falls. Nursing denies concerns.       Past Medical History:   Diagnosis Date    Atrial fibrillation ([facility] [address]     CAD (coronary artery disease)     Cerebrovascular disease     Depression     Hyperlipidemia     Hypertension     Self-care deficit for dressing and grooming     Self-care deficit for medication administration 2010    Type II or unspecified type diabetes mellitus without mention of complication, not stated as uncontrolled 2010    Unspecified cerebral artery occlusion with cerebral infarction       Past Surgical History:   Procedure Laterality Date    CARDIAC CATHETERIZATION  2004   [address]    COLONOSCOPY      COLONOSCOPY N/A 5/1/2019    COLONOSCOPY POLYPECTOMY SNARE/COLD BIOPSY performed by Phoenix Edwards MD at [address] JOINT REPLACEMENT  2008    Lt Total Knee    JOINT REPLACEMENT  2009    Rt Total Knee       Family History   Problem Relation Age of Onset    Cancer Mother         kidney       Social History     Tobacco Use    Smoking status: Never Smoker    Smokeless tobacco: Never Used   Substance Use Topics    Alcohol use: No      Allergies   Allergen Reactions    Papaya Derivatives Hives    Pcn [Penicillins] Hives       Health Maintenance   Topic Date Due    DTaP/Tdap/Td vaccine (1 - Tdap) 09/07/1961    Shingles Vaccine (1 of 2) 09/07/1992    Pneumococcal 65+ years Vaccine (2 of 2 - PPSV23) 07/18/2018    Annual Wellness Visit (AWV)  05/29/2019    Lipid screen  09/04/2019    Potassium monitoring  05/03/2020    Creatinine monitoring  05/03/2020    Flu vaccine (1) 09/01/2020    DEXA (modify frequency per FRAX score)  Addressed    Hepatitis A vaccine  Aged Out    Hib vaccine  Aged Out    Meningococcal (ACWY) vaccine  Aged Out       Subjective:      Review of Systems   Unable to perform ROS: Dementia       Objective:     Physical Exam  Vitals signs and nursing note reviewed. Constitutional:       General: She is not in acute distress. Appearance: Normal appearance. She is obese. She is not diaphoretic. HENT:      Head: Normocephalic and atraumatic. Right Ear: External ear normal.      Left Ear: External ear normal.      Nose: Nose normal. No congestion or rhinorrhea. Mouth/Throat:      Mouth: Mucous membranes are moist.      Pharynx: No oropharyngeal exudate. Eyes:      General: No scleral icterus. Right eye: No discharge. Left eye: No discharge. Extraocular Movements: Extraocular movements intact. Conjunctiva/sclera: Conjunctivae normal.   Neck:      Musculoskeletal: Normal range of motion and neck supple. No neck rigidity or muscular tenderness. Cardiovascular:      Rate and Rhythm: Normal rate and regular rhythm. Pulses: Normal pulses. Heart sounds: Normal heart sounds. No murmur. Pulmonary:      Effort: Pulmonary effort is normal. No respiratory distress. Breath sounds: Normal breath sounds. No wheezing, rhonchi or rales. Abdominal:      General: Bowel sounds are normal. There is no distension. Palpations: Abdomen is soft. Tenderness: There is no abdominal tenderness. Musculoskeletal:         General: No swelling. Right lower leg: No edema. Left lower leg: No edema. Comments: Limited ROM due to patient condition    Skin:     General: Skin is warm and dry.       Coloration: Skin is not jaundiced or pale. Neurological:      Mental Status: She is alert. Mental status is at baseline. She is disoriented. Psychiatric:         Mood and Affect: Mood normal.         Behavior: Behavior normal.       BP (!) 148/80   Pulse 72   Temp 98.7 °F (37.1 °C)   Resp 16   Wt 221 lb (100.2 kg)   SpO2 91%   BMI 40.42 kg/m²     Assessment/Plan      1. Hemiplegia of right dominant side due to noncerebrovascular etiology, unspecified hemiplegia type ([address]   Safety and fall precautions  Monitor for reoccurrance  Continue Coumadin   2. Spastic hemiplegia of right dominant side due to nontraumatic intraparenchymal hemorrhage of brain ([address]    3. Diabetes mellitus type 2 in obese (HCC)   Blood sugars remain elevated  Continue Basaglar 50 units in evening  Start Basaglar 10 units in mornings  Continue sliding scale and mealtime insulin  Continue Metformin   4. Atrial fibrillation, unspecified type ([address]   Rate controlled  Continue Coumadin - adjust to INR  Continue Carvedilol    5. Recurrent major depressive disorder, in partial remission (HCC)   Continue Citalopram    6. S/P stroke due to cerebrovascular disease    7. Gastroesophageal reflux disease without esophagitis   No s/s  Continue Pantoprazole    8. Urge incontinence of urine   Continue Oxybutynin   9. Hemarthrosis of left knee    10. Aphasia as late effect of cerebrovascular accident    6. Essential hypertension   Continue Carvedilol   12. Skin neoplasm - hx of   13. Pure hypercholesterolemia   Continue Atorvastatin  Follow labs   14. Myxoma of heart    15. BCC (basal cell carcinoma), arm, left    16. Folliculitis, lt face. 17. OAB (overactive bladder)    18. Status post total knee replacement, unspecified laterality      Chronic illnesses and diseases reviewed. Medications reviewed. No new orders at this time. Continue current medications. Please call if needed. Patient seen and examined.   History partially obtained by chart review and nursing notes. I have reviewed patient's past medical, surgical, social, and family history and have made updates where appropriate.   See facility EMR for updated medication list.       Electronically signed by AYAKA Jimenez CNP on 7/22/2020 at 2:27 PM

## 2020-07-29 ENCOUNTER — OUTSIDE SERVICES (OUTPATIENT)
Dept: FAMILY MEDICINE CLINIC | Age: 80
End: 2020-07-29
Payer: MEDICARE

## 2020-07-29 PROCEDURE — 93793 ANTICOAG MGMT PT WARFARIN: CPT | Performed by: NURSE PRACTITIONER

## 2020-08-05 ENCOUNTER — OUTSIDE SERVICES (OUTPATIENT)
Dept: FAMILY MEDICINE CLINIC | Age: 80
End: 2020-08-05
Payer: MEDICARE

## 2020-08-05 PROCEDURE — 99309 SBSQ NF CARE MODERATE MDM 30: CPT | Performed by: PHYSICAL MEDICINE & REHABILITATION

## 2020-08-05 PROCEDURE — 99490 CHRNC CARE MGMT STAFF 1ST 20: CPT | Performed by: PHYSICAL MEDICINE & REHABILITATION

## 2020-08-05 NOTE — PROGRESS NOTES
Gayatri Ruiz is a 70 y.o. female who presents today for her medical conditions/complaints as noted below. Chief Complaint   Patient presents with    Other     Acute and monthly           HPI:     Anabel Mallory is a 71 y/o F seen today for her monthly visit and for an acute visit. She has been c/o her right ear hurting. She stated it hurt \"on the inside. \"  On exam, her left ear canal and TM were clear, but her right canal was filled with ear wax. She denied H/A, SOB, chest pain, nausea, and vomiting. She has been sleeping at night and her bowels have been moving. Most recent GLYCO-HGBA1C was 7.1. She denied any other new problems and nursing voiced no other concerns.       Past Medical History:   Diagnosis Date    Atrial fibrillation ([address]     CAD (coronary artery disease)     Cerebrovascular disease     Depression     Hyperlipidemia     Hypertension     Self-care deficit for dressing and grooming     Self-care deficit for medication administration 2010    Type II or unspecified type diabetes mellitus without mention of complication, not stated as uncontrolled 2010    Unspecified cerebral artery occlusion with cerebral infarction       Past Surgical History:   Procedure Laterality Date    CARDIAC CATHETERIZATION  2004   [address]    COLONOSCOPY      COLONOSCOPY N/A 5/1/2019    COLONOSCOPY POLYPECTOMY SNARE/COLD BIOPSY performed by Sukhdev Saez MD at [address] JOINT REPLACEMENT  2008    Lt Total Knee    JOINT REPLACEMENT  2009    Rt Total Knee       Family History   Problem Relation Age of Onset    Cancer Mother         kidney       Social History     Tobacco Use    Smoking status: Never Smoker    Smokeless tobacco: Never Used   Substance Use Topics    Alcohol use: No      Current Outpatient Medications   Medication Sig Dispense Refill    insulin regular (HUMULIN R;NOVOLIN R) 100 UNIT/ML injection Inject 6 Units into the skin 2 times daily (before meals)      amLODIPine-benazepril (LOTREL) 10-40 MG per capsule Take 1 capsule by mouth daily      warfarin (COUMADIN) 5 MG tablet Take 5 mg by mouth      furosemide (LASIX) 20 MG tablet Take 20 mg by mouth 2 times daily      triamcinolone (KENALOG) 0.1 % cream Apply topically 2 times daily as needed Apply topically 2 times daily.  hydrocortisone 1 % cream Apply topically 2 times daily Apply topically 2 times daily.       hydrocortisone (ANUSOL-HC) 25 MG suppository Place 1 suppository rectally 2 times daily      insulin glargine (LANTUS SOLOSTAR) 100 UNIT/ML injection pen Inject 20 Units into the skin nightly (Patient taking differently: Inject 48 Units into the skin nightly ) 5 pen 3    carvedilol (COREG) 6.25 MG tablet Take 1 tablet by mouth 2 times daily 60 tablet 3    ferrous sulfate 325 (65 Fe) MG tablet Take 1 tablet by mouth 2 times daily (with meals) 30 tablet 3    docusate sodium (COLACE) 100 MG capsule Take 100 mg by mouth daily      insulin regular (HUMULIN R;NOVOLIN R) 100 UNIT/ML injection Inject into the skin 4 times daily (before meals and nightly) 151-200= 2 units, 201-250= 4 units, 251-300= 6 units, 301-350= 8 units, 351-400= 10 units, 401+= call MD      metFORMIN (GLUCOPHAGE) 500 MG tablet TAKE ONE TABLET BY MOUTH ONCE DAILY WITH BREAKFAST (Patient taking differently: 500 mg 2 times daily (with meals) ) 60 tablet 5    JANUVIA 100 MG tablet TAKE ONE TABLET BY MOUTH ONCE DAILY 90 tablet 3    simvastatin (ZOCOR) 20 MG tablet TAKE ONE TABLET BY MOUTH ONCE DAILY AT  NIGHT (Patient taking differently: 40 mg ) 90 tablet 3    oxybutynin (DITROPAN-XL) 10 MG extended release tablet TAKE ONE TABLET BY MOUTH ONCE DAILY 90 tablet 3    pantoprazole (PROTONIX) 40 MG tablet TAKE ONE TABLET BY MOUTH ONCE DAILY 90 tablet 3    citalopram (CELEXA) 10 MG tablet TAKE ONE TABLET BY MOUTH ONCE DAILY 90 tablet 3    Ascorbic Acid (VITAMIN C PO) Take 1 tablet by mouth three times a week       vitamin E 400 UNIT capsule Take 400 Units by mouth daily      calcium carbonate (OSCAL) 500 MG TABS tablet Take 500 mg by mouth Three times weekly      Glucagon HCl, rDNA, (GLUCAGEN HYPOKIT IJ) Inject 1 mg as directed as needed. Indications: Extremely Low Blood Sugar      glucose blood VI test strips (ONE TOUCH TEST STRIPS) strip Test blood sugar with One Touch Ultra 2 QID and as needed. 100 strip 5     No current facility-administered medications for this visit. Allergies   Allergen Reactions    Papaya Derivatives Hives    Pcn [Penicillins] Hives       Health Maintenance   Topic Date Due    DTaP/Tdap/Td vaccine (1 - Tdap) 09/07/1961    Shingles Vaccine (1 of 2) 09/07/1992    Pneumococcal 65+ years Vaccine (2 of 2 - PPSV23) 07/18/2018    Annual Wellness Visit (AWV)  05/29/2019    Lipid screen  09/04/2019    Potassium monitoring  05/03/2020    Creatinine monitoring  05/03/2020    Flu vaccine (1) 09/01/2020    DEXA (modify frequency per FRAX score)  Addressed    Hepatitis A vaccine  Aged Out    Hib vaccine  Aged Out    Meningococcal (ACWY) vaccine  Aged Out       Subjective:      Review of Systems   Constitutional: Negative for activity change, appetite change, chills, fatigue and fever. HENT: Positive for ear pain. Negative for congestion, ear discharge, facial swelling, rhinorrhea and sore throat. Right ear pain    Eyes: Negative for photophobia, pain, redness, itching and visual disturbance. Respiratory: Negative for cough, choking, shortness of breath, wheezing and stridor. Cardiovascular: Negative for chest pain and leg swelling. Gastrointestinal: Negative for abdominal pain, constipation, diarrhea, nausea and vomiting. Endocrine: Negative for polydipsia, polyphagia and polyuria. Genitourinary: Negative for decreased urine volume, difficulty urinating, dysuria, frequency, hematuria, menstrual problem and urgency.    Musculoskeletal: Negative for arthralgias, gait problem and myalgias. Skin: Negative for pallor, rash and wound. Allergic/Immunologic: Negative for environmental allergies and immunocompromised state. Neurological: Negative for dizziness, seizures, syncope, light-headedness and headaches. Hematological: Does not bruise/bleed easily. Psychiatric/Behavioral: Negative for dysphoric mood, sleep disturbance and suicidal ideas. The patient is not nervous/anxious. Objective:     Physical Exam  Vitals signs and nursing note reviewed. Constitutional:       General: She is not in acute distress. Appearance: She is well-developed. She is obese. She is not ill-appearing, toxic-appearing or diaphoretic. HENT:      Head: Normocephalic and atraumatic. Right Ear: External ear normal. There is impacted cerumen. Left Ear: Ear canal and external ear normal.      Ears:      Comments: Right ear canal with wax     Nose: Nose normal. No congestion or rhinorrhea. Mouth/Throat:      Pharynx: Oropharynx is clear. Eyes:      General: No scleral icterus. Right eye: No discharge. Left eye: No discharge. Extraocular Movements: Extraocular movements intact. Conjunctiva/sclera: Conjunctivae normal.      Pupils: Pupils are equal, round, and reactive to light. Neck:      Musculoskeletal: Normal range of motion and neck supple. Thyroid: No thyromegaly. Vascular: No JVD. Cardiovascular:      Rate and Rhythm: Normal rate and regular rhythm. Heart sounds: Normal heart sounds. No friction rub. No gallop. Pulmonary:      Effort: Pulmonary effort is normal. No respiratory distress. Breath sounds: Normal breath sounds. No stridor. No wheezing, rhonchi or rales. Abdominal:      General: Bowel sounds are normal. There is no distension. Palpations: Abdomen is soft. Tenderness: There is no abdominal tenderness. There is no right CVA tenderness, left CVA tenderness, guarding or rebound. esophagitis  No s/s  Continue Pantoprazole  8. Urge incontinence of urine  Continue Oxybutynin  9. Hemarthrosis of left knee  10. Aphasia as late effect of cerebrovascular accident  6. Essential hypertension  Continue Carvedilol  12. Skin neoplasm - hx of  13. Pure hypercholesterolemia  Continue Atorvastatin  Follow labs  14. Myxoma of heart  15. BCC (basal cell carcinoma), arm, left  16. Folliculitis, lt face. 17. OAB (overactive bladder)  18. Status post total knee replacement, unspecified laterality      Patient seen and examined. History partially obtained by chart review and nursing notes. I have reviewed patient's past medical, surgical, social, and family history and have made updates where appropriate. See facility EMR for updated medication list.     Resident has right hemiparesis, DM, and A-fib and they are expected to last 12 or more months. These chronic conditions place resident at a significant risk of death, acute exacerbation, or functional decline. The patient's comprehensive plan was monitored today. I spent 20 minutes reviewing plan.      Electronically signed by Ebony Godfrey MD on 8/5/2020 at 2:58 PM

## 2020-08-06 VITALS
HEART RATE: 52 BPM | OXYGEN SATURATION: 90 % | SYSTOLIC BLOOD PRESSURE: 146 MMHG | DIASTOLIC BLOOD PRESSURE: 78 MMHG | WEIGHT: 225.5 LBS | RESPIRATION RATE: 16 BRPM | BODY MASS INDEX: 41.24 KG/M2 | TEMPERATURE: 97.3 F

## 2020-08-06 PROBLEM — N39.41 URGE INCONTINENCE OF URINE: Status: ACTIVE | Noted: 2020-08-06

## 2020-08-06 ASSESSMENT — ENCOUNTER SYMPTOMS
COUGH: 0
VOMITING: 0
WHEEZING: 0
CHOKING: 0
EYE REDNESS: 0
ABDOMINAL PAIN: 0
DIARRHEA: 0
STRIDOR: 0
NAUSEA: 0
SORE THROAT: 0
SHORTNESS OF BREATH: 0
EYE PAIN: 0
RHINORRHEA: 0
PHOTOPHOBIA: 0
EYE ITCHING: 0
FACIAL SWELLING: 0
CONSTIPATION: 0

## 2020-08-19 ENCOUNTER — OUTSIDE SERVICES (OUTPATIENT)
Dept: FAMILY MEDICINE CLINIC | Age: 80
End: 2020-08-19
Payer: MEDICARE

## 2020-08-19 VITALS
BODY MASS INDEX: 41.52 KG/M2 | OXYGEN SATURATION: 87 % | SYSTOLIC BLOOD PRESSURE: 140 MMHG | TEMPERATURE: 98.5 F | RESPIRATION RATE: 20 BRPM | HEART RATE: 74 BPM | WEIGHT: 227 LBS | DIASTOLIC BLOOD PRESSURE: 72 MMHG

## 2020-08-19 PROCEDURE — 99309 SBSQ NF CARE MODERATE MDM 30: CPT | Performed by: NURSE PRACTITIONER

## 2020-08-19 NOTE — PROGRESS NOTES
Natalie Roberts is a 70 y.o. female who presents today for her medical conditions/complaints as noted below. Chief Complaint   Patient presents with    1 Month Follow-Up           HPI:     Chad Stuart is seen today for her monthly chronic illness f/u. She has a PMX of CVA with aphagia and right hemiplegia, a Fib, HTN, HLD, DM II, among others. She is seen while sitting in wheelchair in her room. She denies complaints of pain, shortness of breath, or cough. She has aphasia and difficult to understand most of the time, but is able to explain what she wants or needs. She has hemiplegia of the right arm, weakness of the right leg. She is able to do some of her own ADL's, but continues to need help. No distress noted. Nursing denies concerns. She is oriented to person only. She is pleasant and follows commands.       Past Medical History:   Diagnosis Date    Abdominal pain 12/14/2014    Atrial fibrillation (HCC)     CAD (coronary artery disease)     Cerebrovascular disease     Depression     Hyperlipidemia     Hypertension     Nausea and vomiting 4/13/2015    Self-care deficit for dressing and grooming     Self-care deficit for medication administration 2010    Type II or unspecified type diabetes mellitus without mention of complication, not stated as uncontrolled 2010    Unspecified cerebral artery occlusion with cerebral infarction       Past Surgical History:   Procedure Laterality Date    CARDIAC CATHETERIZATION  2004   [address]    COLONOSCOPY      COLONOSCOPY N/A 5/1/2019    COLONOSCOPY POLYPECTOMY SNARE/COLD BIOPSY performed by Toshia Martinez MD at [facility]    JOINT REPLACEMENT  2008    Lt Total Knee    JOINT REPLACEMENT  2009    Rt Total Knee       Family History   Problem Relation Age of Onset    Cancer Mother         kidney       Social History     Tobacco Use    Smoking status: Never Smoker    Smokeless tobacco: Never Used Substance Use Topics    Alcohol use: No      Allergies   Allergen Reactions    Papaya Derivatives Hives    Pcn [Penicillins] Hives       Health Maintenance   Topic Date Due    DTaP/Tdap/Td vaccine (1 - Tdap) 09/07/1961    Shingles Vaccine (1 of 2) 09/07/1992    Pneumococcal 65+ years Vaccine (2 of 2 - PPSV23) 07/18/2018    Annual Wellness Visit (AWV)  05/29/2019    Lipid screen  09/04/2019    Potassium monitoring  05/03/2020    Creatinine monitoring  05/03/2020    Flu vaccine (1) 09/01/2020    DEXA (modify frequency per FRAX score)  Addressed    Hepatitis A vaccine  Aged Out    Hib vaccine  Aged Out    Meningococcal (ACWY) vaccine  Aged Out       Subjective:      Review of Systems   Unable to perform ROS: Dementia       Objective:     Physical Exam  Vitals signs and nursing note reviewed. Constitutional:       General: She is not in acute distress. Appearance: Normal appearance. She is obese. She is not diaphoretic. HENT:      Head: Normocephalic and atraumatic. Right Ear: External ear normal.      Left Ear: External ear normal.      Nose: Nose normal. No congestion or rhinorrhea. Mouth/Throat:      Pharynx: No oropharyngeal exudate. Eyes:      General: No scleral icterus. Right eye: No discharge. Left eye: No discharge. Conjunctiva/sclera: Conjunctivae normal.   Neck:      Musculoskeletal: Normal range of motion and neck supple. No neck rigidity or muscular tenderness. Cardiovascular:      Rate and Rhythm: Normal rate. Rhythm irregular. Pulses: Normal pulses. Heart sounds: Normal heart sounds. Comments: Distant heart tones  Pulmonary:      Effort: Pulmonary effort is normal. No respiratory distress. Breath sounds: Normal breath sounds. No wheezing, rhonchi or rales. Abdominal:      General: Bowel sounds are normal. There is no distension. Palpations: Abdomen is soft. Tenderness: There is no abdominal tenderness. Musculoskeletal:         General: No swelling or tenderness. Right lower leg: No edema. Left lower leg: No edema. Comments: Limited ROM due to hx CVA   Skin:     General: Skin is warm and dry. Coloration: Skin is not jaundiced or pale. Findings: No bruising or erythema. Neurological:      Mental Status: She is alert. Mental status is at baseline. She is disoriented. Psychiatric:         Mood and Affect: Mood normal.         Behavior: Behavior normal.       BP (!) 140/72   Pulse 74   Temp 98.5 °F (36.9 °C)   Resp 20   Wt 227 lb (103 kg)   SpO2 (!) 87%   BMI 41.52 kg/m²     Assessment/Plan      1. S/P stroke due to cerebrovascular disease   Monitor for reoccurance   2. Hemiplegia of right dominant side due to noncerebrovascular etiology, unspecified hemiplegia type ([address]   As above   3. Spastic hemiplegia of right dominant side due to nontraumatic intraparenchymal hemorrhage of brain ([address]   As above   4. Morbidly obese (HCC)   Weights stable   5. Atrial fibrillation, unspecified type ([address]   Rate controlled  Continue Coumadin - adjust to INR  Continue Carvedilol   6. Recurrent major depressive disorder, in partial remission ([facility])   Continue Citalopram   7. Diabetes mellitus type 2 in obese ([facility])   Continue sliding scale, Basaglar, Metformin   8. Gastroesophageal reflux disease without esophagitis   Continue Pantoprazole   9. Hemarthrosis of left knee    10. Urge incontinence of urine   Continue Oxybutynin   11. Aphasia as late effect of cerebrovascular accident    15. Skin neoplasm - hx of   13. Essential hypertension   Controlled  Continue Carvedilol, lasix, amlodipine   14. Ileus ([address] - hx of       Patient seen and examined. History partially obtained by chart review and nursing notes. I have reviewed patient's past medical, surgical, social, and family history and have made updates where appropriate.   See facility EMR for updated medication list.       Electronically signed by AYAKA Young CNP on 8/19/2020 at 11:51 AM

## 2020-08-27 ENCOUNTER — OUTSIDE SERVICES (OUTPATIENT)
Dept: FAMILY MEDICINE CLINIC | Age: 80
End: 2020-08-27
Payer: MEDICARE

## 2020-08-27 PROCEDURE — 93793 ANTICOAG MGMT PT WARFARIN: CPT | Performed by: NURSE PRACTITIONER

## 2020-09-03 ENCOUNTER — OUTSIDE SERVICES (OUTPATIENT)
Dept: FAMILY MEDICINE CLINIC | Age: 80
End: 2020-09-03

## 2020-09-09 ENCOUNTER — OUTSIDE SERVICES (OUTPATIENT)
Dept: FAMILY MEDICINE CLINIC | Age: 80
End: 2020-09-09

## 2020-09-14 ENCOUNTER — OUTSIDE SERVICES (OUTPATIENT)
Dept: FAMILY MEDICINE CLINIC | Age: 80
End: 2020-09-14
Payer: MEDICARE

## 2020-09-14 VITALS
WEIGHT: 227 LBS | DIASTOLIC BLOOD PRESSURE: 84 MMHG | RESPIRATION RATE: 18 BRPM | BODY MASS INDEX: 41.52 KG/M2 | HEART RATE: 70 BPM | TEMPERATURE: 97.6 F | OXYGEN SATURATION: 90 % | SYSTOLIC BLOOD PRESSURE: 158 MMHG

## 2020-09-14 PROCEDURE — 99309 SBSQ NF CARE MODERATE MDM 30: CPT | Performed by: NURSE PRACTITIONER

## 2020-09-14 PROCEDURE — 99490 CHRNC CARE MGMT STAFF 1ST 20: CPT | Performed by: NURSE PRACTITIONER

## 2020-09-15 NOTE — PROGRESS NOTES
Tequila Romero is a 68 y.o. female who presents today for her medical conditions/complaints as noted below. Chief Complaint   Patient presents with    1 Month Follow-Up           HPI:     Jessica Lozano is seen today for her monthly chronic illness f/u. She has a PMX of CVA with aphagia and right hemiplegia, a Fib, HTN, HLD, DM II, among others. She is seen while sitting in her recliner in her room. She is usually very cheerful and smiling, today she is slightly tearful. Will continue to monitor, due to hx depression. Blood pressure is also elevated, will continue to monitor for trends and adjust meds if continues to be elevated. She is oriented to person only. No distress noted. She follows commands with her left side, she has right hemiplegia from CVA. Nursing denies concerns. Labs reviewed HbA1c 6.9, sugars under control.       Past Medical History:   Diagnosis Date    Abdominal pain 12/14/2014    Atrial fibrillation (HCC)     CAD (coronary artery disease)     Cerebrovascular disease     Depression     Hyperlipidemia     Hypertension     Nausea and vomiting 4/13/2015    Self-care deficit for dressing and grooming     Self-care deficit for medication administration 2010    Type II or unspecified type diabetes mellitus without mention of complication, not stated as uncontrolled 2010    Unspecified cerebral artery occlusion with cerebral infarction       Past Surgical History:   Procedure Laterality Date    CARDIAC CATHETERIZATION  2004   Saratha Halsted    COLONOSCOPY      COLONOSCOPY N/A 5/1/2019    COLONOSCOPY POLYPECTOMY SNARE/COLD BIOPSY performed by [facility] Willett [facility] at [facility]    JOINT REPLACEMENT  2008    Lt Total Knee    JOINT REPLACEMENT  2009    Rt Total Knee       Family History   Problem Relation Age of Onset    Cancer Mother         kidney       Social History     Tobacco Use    Smoking status: Never Smoker    Smokeless tobacco: Musculoskeletal:         General: No swelling or tenderness. Right lower leg: No edema. Left lower leg: No edema. Comments: Limited ROM due to hx CVA   Skin:     General: Skin is warm and dry. Coloration: Skin is not jaundiced or pale. Findings: No bruising or erythema. Neurological:      Mental Status: She is alert. Mental status is at baseline. She is disoriented. Psychiatric:         Mood and Affect: Mood normal.         Behavior: Behavior normal.       BP (!) 158/84   Pulse 70   Temp 97.6 °F (36.4 °C)   Resp 18   Wt 227 lb (103 kg)   SpO2 90%   BMI 41.52 kg/m²     Assessment/Plan      1. S/P stroke due to cerebrovascular disease   Monitor for reoccurance  Continue Coumadin - adjust to INR   2. Hemiplegia of right dominant side due to noncerebrovascular etiology, unspecified hemiplegia type ([address]   As above   3. Spastic hemiplegia of right dominant side due to nontraumatic intraparenchymal hemorrhage of brain ([address]   As above   4. Morbidly obese (HCC)   Weights stable   5. Atrial fibrillation, unspecified type ([address]   Rate controlled  Continue Coumadin - adjust to INR  Continue Carvedilol   6. Recurrent major depressive disorder, in partial remission (HCC)   Down today - monitor  Continue Citalopram   7. Diabetes mellitus type 2 in obese (HCC)   Continue sliding scale, Basaglar, Metformin  HbA1c 6.9   8. Gastroesophageal reflux disease without esophagitis   Continue Pantoprazole   9. Hemarthrosis of left knee    10. Urge incontinence of urine   Continue Oxybutynin   11. Aphasia as late effect of cerebrovascular accident    15. Skin neoplasm - hx of   13. Essential hypertension   Controlled  Continue Carvedilol, lasix, amlodipine   14. Ileus (HCC) - hx of     Chronic illnesses and diseases reviewed. Medications reviewed. No new orders at this time. Continue current medications. Please call if needed. Patient seen and examined.   History partially obtained by chart review and nursing notes. I have reviewed patient's past medical, surgical, social, and family history and have made updates where appropriate.   See facility EMR for updated medication list.       Electronically signed by AYAKA Persaud CNP on 9/14/2020 at 8:46 PM

## 2020-09-16 ENCOUNTER — OUTSIDE SERVICES (OUTPATIENT)
Dept: FAMILY MEDICINE CLINIC | Age: 80
End: 2020-09-16
Payer: MEDICARE

## 2020-09-16 PROCEDURE — 93793 ANTICOAG MGMT PT WARFARIN: CPT | Performed by: NURSE PRACTITIONER

## 2020-09-23 ENCOUNTER — OUTSIDE SERVICES (OUTPATIENT)
Dept: FAMILY MEDICINE CLINIC | Age: 80
End: 2020-09-23

## 2020-09-30 ENCOUNTER — OUTSIDE SERVICES (OUTPATIENT)
Dept: FAMILY MEDICINE CLINIC | Age: 80
End: 2020-09-30

## 2020-09-30 NOTE — PROGRESS NOTES
PT/INR 18.1/1.7. Will change dose to 4 mg Monday-Friday and 5 mg on Saturday and Sunday. Will recheck PT/INR in 1 week.

## 2020-10-07 ENCOUNTER — OUTSIDE SERVICES (OUTPATIENT)
Dept: FAMILY MEDICINE CLINIC | Age: 80
End: 2020-10-07

## 2020-10-08 NOTE — PROGRESS NOTES
PT/INR 2.0. Will continue current dose of Coumadin at 4 mg Monday-Friday and 5 mg on Saturday and Sunday. Will recheck PT/INR in 2 week.

## 2020-10-20 ENCOUNTER — OUTSIDE SERVICES (OUTPATIENT)
Dept: FAMILY MEDICINE CLINIC | Age: 80
End: 2020-10-20
Payer: MEDICARE

## 2020-10-20 VITALS
WEIGHT: 222.5 LBS | TEMPERATURE: 98.2 F | OXYGEN SATURATION: 91 % | RESPIRATION RATE: 24 BRPM | SYSTOLIC BLOOD PRESSURE: 142 MMHG | HEART RATE: 67 BPM | BODY MASS INDEX: 40.7 KG/M2 | DIASTOLIC BLOOD PRESSURE: 74 MMHG

## 2020-10-20 PROCEDURE — 99490 CHRNC CARE MGMT STAFF 1ST 20: CPT | Performed by: NURSE PRACTITIONER

## 2020-10-20 PROCEDURE — 99309 SBSQ NF CARE MODERATE MDM 30: CPT | Performed by: NURSE PRACTITIONER

## 2020-10-20 ASSESSMENT — ENCOUNTER SYMPTOMS
SORE THROAT: 0
EYE PAIN: 0
COUGH: 0
ABDOMINAL PAIN: 0
BACK PAIN: 0
SHORTNESS OF BREATH: 0
RHINORRHEA: 0

## 2020-10-20 NOTE — PROGRESS NOTES
JOINT REPLACEMENT  2009    Rt Total Knee       Social History     Tobacco Use    Smoking status: Never Smoker    Smokeless tobacco: Never Used   Substance Use Topics    Alcohol use: No    Drug use: No       Family History   Problem Relation Age of Onset    Cancer Mother         kidney         Review of Systems   Unable to perform ROS: Dementia   Constitutional: Negative for appetite change. HENT: Negative for congestion, rhinorrhea and sore throat. Eyes: Negative for pain. Respiratory: Negative for cough and shortness of breath. Cardiovascular: Negative for chest pain and leg swelling. Gastrointestinal: Negative for abdominal pain. Genitourinary: Negative for difficulty urinating. Musculoskeletal: Positive for gait problem. Negative for arthralgias and back pain. Neurological: Negative for dizziness and headaches. Psychiatric/Behavioral: The patient is not nervous/anxious. PHYSICAL EXAM:  Vitals:    10/20/20 1527   BP: (!) 142/74   Pulse: 67   Resp: 24   Temp: 98.2 °F (36.8 °C)   SpO2: 91%        Physical Exam  Vitals signs and nursing note reviewed. Constitutional:       General: She is not in acute distress. Appearance: Normal appearance. She is obese. She is not diaphoretic. HENT:      Head: Normocephalic and atraumatic. Right Ear: External ear normal.      Left Ear: External ear normal.      Nose: Nose normal. No congestion or rhinorrhea. Eyes:      General: No scleral icterus. Right eye: No discharge. Left eye: No discharge. Conjunctiva/sclera: Conjunctivae normal.   Neck:      Musculoskeletal: Normal range of motion. No neck rigidity. Pulmonary:      Effort: Pulmonary effort is normal. No respiratory distress. Musculoskeletal:      Right lower leg: No edema. Left lower leg: No edema. Comments: Limited ROM due to hx of CVA   Skin:     Coloration: Skin is not jaundiced or pale. Neurological:      Mental Status: She is alert. reconciliation. Pursuant to the emergency declaration under the [facility]note 2[facility], [facility] waiver authority and the [employer] and [employer] Act, this Virtual  Visit was conducted, with patient's consent, to reduce the patient's risk of exposure to COVID-19 and provide continuity of care for an established patient. Services were provided through a video synchronous discussion virtually to substitute for in-person SNF visit.     Electronically signed by AYAKA Roy CNP on 10/20/2020 at 3:30 PM

## 2020-11-04 LAB — SARS-COV-2: POSITIVE

## 2020-11-10 ENCOUNTER — APPOINTMENT (OUTPATIENT)
Dept: CT IMAGING | Age: 80
DRG: 177 | End: 2020-11-10
Payer: MEDICARE

## 2020-11-10 ENCOUNTER — HOSPITAL ENCOUNTER (INPATIENT)
Age: 80
LOS: 9 days | Discharge: HOSPICE | DRG: 177 | End: 2020-11-19
Attending: EMERGENCY MEDICINE | Admitting: HOSPITALIST
Payer: MEDICARE

## 2020-11-10 ENCOUNTER — APPOINTMENT (OUTPATIENT)
Dept: GENERAL RADIOLOGY | Age: 80
DRG: 177 | End: 2020-11-10
Payer: MEDICARE

## 2020-11-10 PROBLEM — R41.82 AMS (ALTERED MENTAL STATUS): Status: ACTIVE | Noted: 2020-11-10

## 2020-11-10 LAB
ALBUMIN SERPL-MCNC: 2.9 G/DL (ref 3.5–5.1)
ALBUMIN SERPL-MCNC: 3.2 G/DL (ref 3.5–5.1)
ALP BLD-CCNC: 78 U/L (ref 38–126)
ALP BLD-CCNC: 78 U/L (ref 38–126)
ALT SERPL-CCNC: 14 U/L (ref 11–66)
ALT SERPL-CCNC: 15 U/L (ref 11–66)
ANION GAP SERPL CALCULATED.3IONS-SCNC: 12 MEQ/L (ref 8–16)
ANION GAP SERPL CALCULATED.3IONS-SCNC: 13 MEQ/L (ref 8–16)
APTT: 32.6 SECONDS (ref 22–38)
AST SERPL-CCNC: 13 U/L (ref 5–40)
AST SERPL-CCNC: 14 U/L (ref 5–40)
BACTERIA: ABNORMAL /HPF
BASOPHILS # BLD: 0 %
BASOPHILS # BLD: 0.1 %
BASOPHILS ABSOLUTE: 0 THOU/MM3 (ref 0–0.1)
BASOPHILS ABSOLUTE: 0 THOU/MM3 (ref 0–0.1)
BILIRUB SERPL-MCNC: 0.2 MG/DL (ref 0.3–1.2)
BILIRUB SERPL-MCNC: 0.2 MG/DL (ref 0.3–1.2)
BILIRUBIN URINE: NEGATIVE
BLOOD, URINE: NEGATIVE
BUN BLDV-MCNC: 37 MG/DL (ref 7–22)
BUN BLDV-MCNC: 38 MG/DL (ref 7–22)
CALCIUM SERPL-MCNC: 8.3 MG/DL (ref 8.5–10.5)
CALCIUM SERPL-MCNC: 8.7 MG/DL (ref 8.5–10.5)
CASTS 2: ABNORMAL /LPF
CASTS UA: ABNORMAL /LPF
CHARACTER, URINE: CLEAR
CHLORIDE BLD-SCNC: 103 MEQ/L (ref 98–111)
CHLORIDE BLD-SCNC: 103 MEQ/L (ref 98–111)
CHP ED QC CHECK: 181
CO2: 22 MEQ/L (ref 23–33)
CO2: 28 MEQ/L (ref 23–33)
COLOR: YELLOW
CREAT SERPL-MCNC: 1.1 MG/DL (ref 0.4–1.2)
CREAT SERPL-MCNC: 1.5 MG/DL (ref 0.4–1.2)
CRYSTALS, UA: ABNORMAL
EKG ATRIAL RATE: 65 BPM
EKG P AXIS: 74 DEGREES
EKG P-R INTERVAL: 200 MS
EKG Q-T INTERVAL: 416 MS
EKG QRS DURATION: 86 MS
EKG QTC CALCULATION (BAZETT): 432 MS
EKG R AXIS: -40 DEGREES
EKG T AXIS: 50 DEGREES
EKG VENTRICULAR RATE: 65 BPM
EOSINOPHIL # BLD: 0 %
EOSINOPHIL # BLD: 0 %
EOSINOPHILS ABSOLUTE: 0 THOU/MM3 (ref 0–0.4)
EOSINOPHILS ABSOLUTE: 0 THOU/MM3 (ref 0–0.4)
EPITHELIAL CELLS, UA: ABNORMAL /HPF
ERYTHROCYTE [DISTWIDTH] IN BLOOD BY AUTOMATED COUNT: 15.7 % (ref 11.5–14.5)
ERYTHROCYTE [DISTWIDTH] IN BLOOD BY AUTOMATED COUNT: 15.8 % (ref 11.5–14.5)
ERYTHROCYTE [DISTWIDTH] IN BLOOD BY AUTOMATED COUNT: 48.1 FL (ref 35–45)
ERYTHROCYTE [DISTWIDTH] IN BLOOD BY AUTOMATED COUNT: 48.4 FL (ref 35–45)
GFR SERPL CREATININE-BSD FRML MDRD: 34 ML/MIN/1.73M2
GFR SERPL CREATININE-BSD FRML MDRD: 48 ML/MIN/1.73M2
GLUCOSE BLD-MCNC: 146 MG/DL (ref 70–108)
GLUCOSE BLD-MCNC: 153 MG/DL (ref 70–108)
GLUCOSE BLD-MCNC: 154 MG/DL (ref 70–108)
GLUCOSE BLD-MCNC: 181 MG/DL
GLUCOSE BLD-MCNC: 181 MG/DL (ref 70–108)
GLUCOSE BLD-MCNC: 212 MG/DL (ref 70–108)
GLUCOSE URINE: NEGATIVE MG/DL
HCT VFR BLD CALC: 38.2 % (ref 37–47)
HCT VFR BLD CALC: 39 % (ref 37–47)
HEMOGLOBIN: 11.7 GM/DL (ref 12–16)
HEMOGLOBIN: 12.1 GM/DL (ref 12–16)
IMMATURE GRANS (ABS): 0.04 THOU/MM3 (ref 0–0.07)
IMMATURE GRANS (ABS): 0.05 THOU/MM3 (ref 0–0.07)
IMMATURE GRANULOCYTES: 0.5 %
IMMATURE GRANULOCYTES: 0.5 %
INR BLD: 2.46 (ref 0.85–1.13)
KETONES, URINE: NEGATIVE
LACTIC ACID, SEPSIS: 1.8 MMOL/L (ref 0.5–1.9)
LEUKOCYTE ESTERASE, URINE: ABNORMAL
LYMPHOCYTES # BLD: 12.8 %
LYMPHOCYTES # BLD: 15.7 %
LYMPHOCYTES ABSOLUTE: 1.2 THOU/MM3 (ref 1–4.8)
LYMPHOCYTES ABSOLUTE: 1.2 THOU/MM3 (ref 1–4.8)
MCH RBC QN AUTO: 26 PG (ref 26–33)
MCH RBC QN AUTO: 26.3 PG (ref 26–33)
MCHC RBC AUTO-ENTMCNC: 30.6 GM/DL (ref 32.2–35.5)
MCHC RBC AUTO-ENTMCNC: 31 GM/DL (ref 32.2–35.5)
MCV RBC AUTO: 84.8 FL (ref 81–99)
MCV RBC AUTO: 84.9 FL (ref 81–99)
MISCELLANEOUS 2: ABNORMAL
MONOCYTES # BLD: 3.9 %
MONOCYTES # BLD: 4.5 %
MONOCYTES ABSOLUTE: 0.3 THOU/MM3 (ref 0.4–1.3)
MONOCYTES ABSOLUTE: 0.4 THOU/MM3 (ref 0.4–1.3)
NITRITE, URINE: NEGATIVE
NUCLEATED RED BLOOD CELLS: 0 /100 WBC
NUCLEATED RED BLOOD CELLS: 0 /100 WBC
OSMOLALITY CALCULATION: 290.7 MOSMOL/KG (ref 275–300)
OSMOLALITY CALCULATION: 297.1 MOSMOL/KG (ref 275–300)
PH UA: 5 (ref 5–9)
PLATELET # BLD: 245 THOU/MM3 (ref 130–400)
PLATELET # BLD: 259 THOU/MM3 (ref 130–400)
PMV BLD AUTO: 10.8 FL (ref 9.4–12.4)
PMV BLD AUTO: 11 FL (ref 9.4–12.4)
POTASSIUM REFLEX MAGNESIUM: 4.5 MEQ/L (ref 3.5–5.2)
POTASSIUM REFLEX MAGNESIUM: 4.8 MEQ/L (ref 3.5–5.2)
PRO-BNP: 2264 PG/ML (ref 0–1800)
PROCALCITONIN: 0.06 NG/ML (ref 0.01–0.09)
PROTEIN UA: NEGATIVE
RBC # BLD: 4.5 MILL/MM3 (ref 4.2–5.4)
RBC # BLD: 4.6 MILL/MM3 (ref 4.2–5.4)
RBC URINE: ABNORMAL /HPF
RENAL EPITHELIAL, UA: ABNORMAL
SEG NEUTROPHILS: 79.9 %
SEG NEUTROPHILS: 82.1 %
SEGMENTED NEUTROPHILS ABSOLUTE COUNT: 6.1 THOU/MM3 (ref 1.8–7.7)
SEGMENTED NEUTROPHILS ABSOLUTE COUNT: 7.6 THOU/MM3 (ref 1.8–7.7)
SODIUM BLD-SCNC: 138 MEQ/L (ref 135–145)
SODIUM BLD-SCNC: 143 MEQ/L (ref 135–145)
SPECIFIC GRAVITY, URINE: 1.01 (ref 1–1.03)
TOTAL PROTEIN: 6.2 G/DL (ref 6.1–8)
TOTAL PROTEIN: 6.7 G/DL (ref 6.1–8)
TROPONIN T: < 0.01 NG/ML
UROBILINOGEN, URINE: 0.2 EU/DL (ref 0–1)
WBC # BLD: 7.6 THOU/MM3 (ref 4.8–10.8)
WBC # BLD: 9.3 THOU/MM3 (ref 4.8–10.8)
WBC UA: ABNORMAL /HPF
YEAST: ABNORMAL

## 2020-11-10 PROCEDURE — 70450 CT HEAD/BRAIN W/O DYE: CPT

## 2020-11-10 PROCEDURE — 2580000003 HC RX 258: Performed by: PHYSICIAN ASSISTANT

## 2020-11-10 PROCEDURE — 6360000002 HC RX W HCPCS: Performed by: STUDENT IN AN ORGANIZED HEALTH CARE EDUCATION/TRAINING PROGRAM

## 2020-11-10 PROCEDURE — 99284 EMERGENCY DEPT VISIT MOD MDM: CPT

## 2020-11-10 PROCEDURE — 82948 REAGENT STRIP/BLOOD GLUCOSE: CPT

## 2020-11-10 PROCEDURE — 6370000000 HC RX 637 (ALT 250 FOR IP): Performed by: PHYSICIAN ASSISTANT

## 2020-11-10 PROCEDURE — 83880 ASSAY OF NATRIURETIC PEPTIDE: CPT

## 2020-11-10 PROCEDURE — 71045 X-RAY EXAM CHEST 1 VIEW: CPT

## 2020-11-10 PROCEDURE — 85730 THROMBOPLASTIN TIME PARTIAL: CPT

## 2020-11-10 PROCEDURE — 1200000003 HC TELEMETRY R&B

## 2020-11-10 PROCEDURE — 6370000000 HC RX 637 (ALT 250 FOR IP): Performed by: FAMILY MEDICINE

## 2020-11-10 PROCEDURE — 84484 ASSAY OF TROPONIN QUANT: CPT

## 2020-11-10 PROCEDURE — 81001 URINALYSIS AUTO W/SCOPE: CPT

## 2020-11-10 PROCEDURE — 80053 COMPREHEN METABOLIC PANEL: CPT

## 2020-11-10 PROCEDURE — 85025 COMPLETE CBC W/AUTO DIFF WBC: CPT

## 2020-11-10 PROCEDURE — 93005 ELECTROCARDIOGRAM TRACING: CPT | Performed by: STUDENT IN AN ORGANIZED HEALTH CARE EDUCATION/TRAINING PROGRAM

## 2020-11-10 PROCEDURE — 99223 1ST HOSP IP/OBS HIGH 75: CPT | Performed by: PHYSICIAN ASSISTANT

## 2020-11-10 PROCEDURE — 2580000003 HC RX 258: Performed by: STUDENT IN AN ORGANIZED HEALTH CARE EDUCATION/TRAINING PROGRAM

## 2020-11-10 PROCEDURE — 36415 COLL VENOUS BLD VENIPUNCTURE: CPT

## 2020-11-10 PROCEDURE — 83605 ASSAY OF LACTIC ACID: CPT

## 2020-11-10 PROCEDURE — 6360000002 HC RX W HCPCS: Performed by: PHYSICIAN ASSISTANT

## 2020-11-10 PROCEDURE — 93010 ELECTROCARDIOGRAM REPORT: CPT | Performed by: NUCLEAR MEDICINE

## 2020-11-10 PROCEDURE — 85610 PROTHROMBIN TIME: CPT

## 2020-11-10 PROCEDURE — 87040 BLOOD CULTURE FOR BACTERIA: CPT

## 2020-11-10 PROCEDURE — 84145 PROCALCITONIN (PCT): CPT

## 2020-11-10 RX ORDER — SODIUM CHLORIDE 9 MG/ML
INJECTION, SOLUTION INTRAVENOUS CONTINUOUS
Status: DISCONTINUED | OUTPATIENT
Start: 2020-11-10 | End: 2020-11-12

## 2020-11-10 RX ORDER — LISINOPRIL 40 MG/1
40 TABLET ORAL DAILY
Status: DISCONTINUED | OUTPATIENT
Start: 2020-11-11 | End: 2020-11-19 | Stop reason: HOSPADM

## 2020-11-10 RX ORDER — CALCIUM CARBONATE 200(500)MG
1 TABLET,CHEWABLE ORAL
COMMUNITY
Start: 2019-08-03

## 2020-11-10 RX ORDER — VITAMIN B COMPLEX
2000 TABLET ORAL DAILY
Status: DISCONTINUED | OUTPATIENT
Start: 2020-11-10 | End: 2020-11-15

## 2020-11-10 RX ORDER — FERROUS SULFATE 325(65) MG
325 TABLET ORAL DAILY
COMMUNITY
Start: 2019-08-02

## 2020-11-10 RX ORDER — FERROUS SULFATE 325(65) MG
325 TABLET ORAL DAILY
Status: DISCONTINUED | OUTPATIENT
Start: 2020-11-11 | End: 2020-11-19 | Stop reason: HOSPADM

## 2020-11-10 RX ORDER — ALBUTEROL SULFATE 90 UG/1
2 AEROSOL, METERED RESPIRATORY (INHALATION) EVERY 4 HOURS PRN
COMMUNITY
Start: 2020-11-04

## 2020-11-10 RX ORDER — INSULIN GLARGINE 100 [IU]/ML
10 INJECTION, SOLUTION SUBCUTANEOUS DAILY
Status: DISCONTINUED | OUTPATIENT
Start: 2020-11-11 | End: 2020-11-14

## 2020-11-10 RX ORDER — CITALOPRAM 20 MG/1
10 TABLET ORAL DAILY
Status: DISCONTINUED | OUTPATIENT
Start: 2020-11-11 | End: 2020-11-19 | Stop reason: HOSPADM

## 2020-11-10 RX ORDER — WARFARIN SODIUM 4 MG/1
4 TABLET ORAL
Status: COMPLETED | OUTPATIENT
Start: 2020-11-10 | End: 2020-11-10

## 2020-11-10 RX ORDER — DEXAMETHASONE 4 MG/1
6 TABLET ORAL DAILY
COMMUNITY
Start: 2020-11-05 | End: 2020-11-15

## 2020-11-10 RX ORDER — ASCORBIC ACID 500 MG
500 TABLET ORAL
COMMUNITY
Start: 2019-08-05

## 2020-11-10 RX ORDER — DOCUSATE SODIUM 100 MG/1
100 CAPSULE, LIQUID FILLED ORAL DAILY
Status: DISCONTINUED | OUTPATIENT
Start: 2020-11-11 | End: 2020-11-19 | Stop reason: HOSPADM

## 2020-11-10 RX ORDER — OXYBUTYNIN CHLORIDE 10 MG/1
1 TABLET, EXTENDED RELEASE ORAL DAILY
Status: DISCONTINUED | OUTPATIENT
Start: 2020-11-11 | End: 2020-11-19 | Stop reason: HOSPADM

## 2020-11-10 RX ORDER — ATORVASTATIN CALCIUM 20 MG/1
20 TABLET, FILM COATED ORAL DAILY
Status: DISCONTINUED | OUTPATIENT
Start: 2020-11-11 | End: 2020-11-19 | Stop reason: HOSPADM

## 2020-11-10 RX ORDER — PANTOPRAZOLE SODIUM 40 MG/1
40 TABLET, DELAYED RELEASE ORAL DAILY
Status: DISCONTINUED | OUTPATIENT
Start: 2020-11-11 | End: 2020-11-19 | Stop reason: HOSPADM

## 2020-11-10 RX ORDER — AMLODIPINE BESYLATE 10 MG/1
10 TABLET ORAL DAILY
Status: DISCONTINUED | OUTPATIENT
Start: 2020-11-11 | End: 2020-11-19 | Stop reason: HOSPADM

## 2020-11-10 RX ORDER — DEXAMETHASONE 4 MG/1
6 TABLET ORAL DAILY
Status: DISCONTINUED | OUTPATIENT
Start: 2020-11-10 | End: 2020-11-12

## 2020-11-10 RX ORDER — ACETAMINOPHEN 325 MG/1
650 TABLET ORAL EVERY 6 HOURS PRN
Status: DISCONTINUED | OUTPATIENT
Start: 2020-11-10 | End: 2020-11-19 | Stop reason: HOSPADM

## 2020-11-10 RX ORDER — ZINC SULFATE 50(220)MG
50 CAPSULE ORAL DAILY
COMMUNITY
Start: 2020-10-29 | End: 2020-11-29

## 2020-11-10 RX ORDER — CALCIUM CARBONATE 200(500)MG
500 TABLET,CHEWABLE ORAL
Status: DISCONTINUED | OUTPATIENT
Start: 2020-11-12 | End: 2020-11-19 | Stop reason: HOSPADM

## 2020-11-10 RX ORDER — ASCORBIC ACID 500 MG
500 TABLET ORAL DAILY
Status: DISCONTINUED | OUTPATIENT
Start: 2020-11-10 | End: 2020-11-19 | Stop reason: HOSPADM

## 2020-11-10 RX ORDER — INSULIN GLARGINE 100 [IU]/ML
30 INJECTION, SOLUTION SUBCUTANEOUS NIGHTLY
Status: DISCONTINUED | OUTPATIENT
Start: 2020-11-10 | End: 2020-11-19 | Stop reason: HOSPADM

## 2020-11-10 RX ORDER — ACETAMINOPHEN 650 MG/1
650 SUPPOSITORY RECTAL EVERY 6 HOURS PRN
Status: DISCONTINUED | OUTPATIENT
Start: 2020-11-10 | End: 2020-11-19 | Stop reason: HOSPADM

## 2020-11-10 RX ORDER — AMLODIPINE BESYLATE AND BENAZEPRIL HYDROCHLORIDE 10; 40 MG/1; MG/1
1 CAPSULE ORAL DAILY
Status: DISCONTINUED | OUTPATIENT
Start: 2020-11-10 | End: 2020-11-10

## 2020-11-10 RX ORDER — INSULIN GLARGINE 100 [IU]/ML
10 INJECTION, SOLUTION SUBCUTANEOUS EVERY MORNING
COMMUNITY

## 2020-11-10 RX ORDER — WARFARIN SODIUM 4 MG/1
4 TABLET ORAL
COMMUNITY
Start: 2020-10-01

## 2020-11-10 RX ORDER — ATORVASTATIN CALCIUM 20 MG/1
20 TABLET, FILM COATED ORAL NIGHTLY
COMMUNITY
Start: 2020-05-26

## 2020-11-10 RX ORDER — ACETAMINOPHEN 325 MG/1
650 TABLET ORAL EVERY 4 HOURS PRN
COMMUNITY
Start: 2019-08-01

## 2020-11-10 RX ORDER — 0.9 % SODIUM CHLORIDE 0.9 %
1000 INTRAVENOUS SOLUTION INTRAVENOUS ONCE
Status: COMPLETED | OUTPATIENT
Start: 2020-11-10 | End: 2020-11-10

## 2020-11-10 RX ORDER — LATANOPROST 50 UG/ML
1 SOLUTION/ DROPS OPHTHALMIC NIGHTLY
COMMUNITY
Start: 2020-01-21

## 2020-11-10 RX ORDER — CARVEDILOL 6.25 MG/1
6.25 TABLET ORAL 2 TIMES DAILY
Status: DISCONTINUED | OUTPATIENT
Start: 2020-11-10 | End: 2020-11-19 | Stop reason: HOSPADM

## 2020-11-10 RX ORDER — FUROSEMIDE 40 MG/1
20 TABLET ORAL 2 TIMES DAILY
Status: DISCONTINUED | OUTPATIENT
Start: 2020-11-10 | End: 2020-11-11

## 2020-11-10 RX ORDER — CHOLECALCIFEROL (VITAMIN D3) 50 MCG
2000 TABLET ORAL DAILY
COMMUNITY
Start: 2020-10-29 | End: 2020-12-29

## 2020-11-10 RX ORDER — ZINC SULFATE 50(220)MG
50 CAPSULE ORAL DAILY
Status: DISCONTINUED | OUTPATIENT
Start: 2020-11-10 | End: 2020-11-19 | Stop reason: HOSPADM

## 2020-11-10 RX ORDER — INSULIN GLARGINE 100 [IU]/ML
30 INJECTION, SOLUTION SUBCUTANEOUS NIGHTLY
COMMUNITY

## 2020-11-10 RX ORDER — POTASSIUM CHLORIDE 20 MEQ/1
20 TABLET, EXTENDED RELEASE ORAL DAILY
COMMUNITY
Start: 2020-10-19

## 2020-11-10 RX ORDER — LATANOPROST 50 UG/ML
1 SOLUTION/ DROPS OPHTHALMIC NIGHTLY
Status: DISCONTINUED | OUTPATIENT
Start: 2020-11-10 | End: 2020-11-19 | Stop reason: HOSPADM

## 2020-11-10 RX ADMIN — INSULIN LISPRO 1 UNITS: 100 INJECTION, SOLUTION INTRAVENOUS; SUBCUTANEOUS at 22:35

## 2020-11-10 RX ADMIN — CEFTRIAXONE SODIUM 1 G: 1 INJECTION, POWDER, FOR SOLUTION INTRAMUSCULAR; INTRAVENOUS at 15:58

## 2020-11-10 RX ADMIN — INSULIN GLARGINE 30 UNITS: 100 INJECTION, SOLUTION SUBCUTANEOUS at 22:14

## 2020-11-10 RX ADMIN — CARVEDILOL 6.25 MG: 6.25 TABLET, FILM COATED ORAL at 22:03

## 2020-11-10 RX ADMIN — Medication 50 MG: at 22:15

## 2020-11-10 RX ADMIN — WARFARIN SODIUM 4 MG: 4 TABLET ORAL at 22:16

## 2020-11-10 RX ADMIN — SODIUM CHLORIDE: 9 INJECTION, SOLUTION INTRAVENOUS at 21:04

## 2020-11-10 RX ADMIN — OXYCODONE HYDROCHLORIDE AND ACETAMINOPHEN 500 MG: 500 TABLET ORAL at 22:15

## 2020-11-10 RX ADMIN — SODIUM CHLORIDE 1000 ML: 9 INJECTION, SOLUTION INTRAVENOUS at 12:10

## 2020-11-10 RX ADMIN — Medication 2000 UNITS: at 22:15

## 2020-11-10 RX ADMIN — DEXAMETHASONE 6 MG: 4 TABLET ORAL at 22:02

## 2020-11-10 RX ADMIN — FUROSEMIDE 20 MG: 40 TABLET ORAL at 22:07

## 2020-11-10 RX ADMIN — AZITHROMYCIN DIHYDRATE 500 MG: 500 INJECTION, POWDER, LYOPHILIZED, FOR SOLUTION INTRAVENOUS at 18:25

## 2020-11-10 ASSESSMENT — ENCOUNTER SYMPTOMS
BACK PAIN: 0
NAUSEA: 0
EYE PAIN: 0
COUGH: 0
SINUS PAIN: 0
CONSTIPATION: 0
ABDOMINAL PAIN: 0
SHORTNESS OF BREATH: 0
VOMITING: 0
DIARRHEA: 0

## 2020-11-10 ASSESSMENT — PAIN SCALES - WONG BAKER: WONGBAKER_NUMERICALRESPONSE: 0

## 2020-11-10 NOTE — ACP (ADVANCE CARE PLANNING)
Educated Patient / Jian Arreguin regarding differences between Advance Directives and portable DNR orders. Length of ACP Conversation in minutes:  12  Conversation Outcomes:  [] ACP discussion completed  [x] Existing advance directive reviewed with patient; no changes to patient's previously recorded wishes  [] New Advance Directive completed  [] Portable Do Not Rescitate prepared for Provider review and signature  [] POLST/POST/MOLST/MOST prepared for Provider review and signature      Follow-up plan:    [] Schedule follow-up conversation to continue planning  [x] Referred individual to Provider for additional questions/concerns   [] Advised patient/agent/surrogate to review completed ACP document and update if needed with changes in condition, patient preferences or care setting    [] This note routed to one or more involved healthcare providers     -Joey Cowart has suffered from a stroke in the past. She did not want me to call her  Lexie Talavera) but was willing to allow me to discuss her care with her daughter Latha Ramirez). A call was placed to try and discuss care with Roseann Montgomery however there was no answer and no ability to leave a voicemail. (No voicemail set up on the phone). - It is not known of the patient's preference to ventilation but her DNR-CCA which accompanied her from the ECF was present in the room. This information was provided to the patient's physician in the ED.  - No further follow-up will be made from the ACP team at this time.

## 2020-11-10 NOTE — ED PROVIDER NOTES
fever.   HENT: Negative for ear pain and sinus pain. Eyes: Negative for pain. Respiratory: Negative for cough and shortness of breath. Cardiovascular: Negative for chest pain and leg swelling. Gastrointestinal: Negative for abdominal pain, constipation, diarrhea, nausea and vomiting. Genitourinary: Negative for dysuria. Musculoskeletal: Negative for back pain and neck pain. Skin: Negative for wound. Neurological: Negative for headaches. Psychiatric/Behavioral: Negative for confusion.          PAST MEDICAL AND SURGICAL HISTORY     Past Medical History:   Diagnosis Date    Abdominal pain 12/14/2014    Atrial fibrillation ([address]     CAD (coronary artery disease)     Cerebrovascular disease     Depression     Hyperlipidemia     Hypertension     Nausea and vomiting 4/13/2015    Self-care deficit for dressing and grooming     Self-care deficit for medication administration 2010    Type II or unspecified type diabetes mellitus without mention of complication, not stated as uncontrolled 2010    Unspecified cerebral artery occlusion with cerebral infarction      Past Surgical History:   Procedure Laterality Date    CARDIAC [address]HETERIZATION  2004   [address]    COLONOSCOPY      COLONOSCOPY N/A 5/1/2019    COLONOSCOPY POLYPECTOMY SNARE/COLD BIOPSY performed by Tanesha Hernadez MD at [facility]    JOINT REPLACEMENT  2008    Lt Total Knee    JOINT REPLACEMENT  2009    Rt Total Knee         MEDICATIONS     Current Facility-Administered Medications:     cefTRIAXone (ROCEPHIN) 1 g IVPB in 50 mL D5W minibag, 1 g, Intravenous, Once, Ibeth Fitzgerald MD    azithromycin (ZITHROMAX) 500 mg in D5W 250ml addavial, 500 mg, Intravenous, Once, Ibeth Fitzgerald MD    Current Outpatient Medications:     insulin regular (HUMULIN R;NOVOLIN R) 100 UNIT/ML injection, Inject 6 Units into the skin 2 times daily (before meals), Disp: , Rfl:    ONCE DAILY, Disp: 90 tablet, Rfl: 3    citalopram (CELEXA) 10 MG tablet, TAKE ONE TABLET BY MOUTH ONCE DAILY, Disp: 90 tablet, Rfl: 3    Ascorbic Acid (VITAMIN C PO), Take 1 tablet by mouth three times a week , Disp: , Rfl:     vitamin E 400 UNIT capsule, Take 400 Units by mouth daily, Disp: , Rfl:     calcium carbonate (OSCAL) 500 MG TABS tablet, Take 500 mg by mouth Three times weekly, Disp: , Rfl:     Glucagon HCl, rDNA, (GLUCAGEN HYPOKIT IJ), Inject 1 mg as directed as needed. Indications: Extremely Low Blood Sugar, Disp: , Rfl:     glucose blood VI test strips (ONE TOUCH TEST STRIPS) strip, Test blood sugar with One Touch Ultra 2 QID and as needed. , Disp: 100 strip, Rfl: 5      SOCIAL HISTORY     Social History     Social History Narrative    Not on file     Social History     Tobacco Use    Smoking status: Never Smoker    Smokeless tobacco: Never Used   Substance Use Topics    Alcohol use: No    Drug use: No         ALLERGIES     Allergies   Allergen Reactions    Papaya Derivatives Hives    Pcn [Penicillins] Hives         FAMILY HISTORY     Family History   Problem Relation Age of Onset    Cancer Mother         kidney         PREVIOUS RECORDS   Previous records reviewed: Patient seen most recently for family medicine visit by on October 20, 2020 for monthly visit via telemetry medication for monthly follow-up. PHYSICAL EXAM     ED Triage Vitals [11/10/20 1033]   BP Temp Temp src Pulse Resp SpO2 Height Weight   (!) 153/70 -- -- 78 18 96 % -- --     Initial vital signs and nursing assessment reviewed and vitals are/show: abnormal from Hypertensive. Pulsoximetry is normal per my interpretation. Additional Vital Signs:  Vitals:    11/10/20 1210   BP: (!) 133/59   Pulse: 65   Resp: 22   Temp:    SpO2: 96%       Physical Exam  Constitutional:       General: She is not in acute distress. Appearance: She is obese. She is not ill-appearing, toxic-appearing or diaphoretic.       Comments: Patient appears to be chronically ill   HENT:      Head: Normocephalic and atraumatic. Right Ear: External ear normal.      Left Ear: External ear normal.   Eyes:      General: No scleral icterus. Right eye: No discharge. Left eye: No discharge. Neck:      Musculoskeletal: Normal range of motion and neck supple. Cardiovascular:      Rate and Rhythm: Normal rate and regular rhythm. Pulses: Normal pulses. Heart sounds: Normal heart sounds. No murmur. No friction rub. No gallop. Pulmonary:      Effort: Pulmonary effort is normal.      Breath sounds: Normal breath sounds. No wheezing. Chest:      Chest wall: No tenderness. Abdominal:      General: Abdomen is flat. There is no distension. Palpations: Abdomen is soft. Tenderness: There is no abdominal tenderness. There is no guarding or rebound. Musculoskeletal:      Right lower leg: No edema. Left lower leg: No edema. Skin:     General: Skin is warm and dry. Neurological:      Mental Status: She is alert. GCS: GCS eye subscore is 4. GCS verbal subscore is 5. GCS motor subscore is 6. Comments: Alert and oriented to self and place, but not time. GCS 15  According to chart review, patient has residual right-sided hemiparesis and aphasia from previous CVA   Psychiatric:         Mood and Affect: Mood normal.         Behavior: Behavior normal.         Thought Content: Thought content normal.         Judgment: Judgment normal.             MEDICAL DECISION MAKING   Initial Assessment:     COVID-19. Altered mental status.   CVA versus sepsis      Plan:     Labs: CBC, CMP, BNP, blood cultures, lactate, point-of-care glucose, INR, troponin, UA  Imaging: Chest x-ray, CT head  EKG    IV fluids  Check temperature    Chest x-ray concerning for pneumonia  Antibiotics started: Ceftriaxone plus azithromycin    CURB65- 3 = severe    Admission            ED RESULTS   Laboratory results:  Labs Reviewed   CBC WITH AUTO DIFFERENTIAL - Abnormal; Notable for the following components:       Result Value    MCHC 31.0 (*)     RDW-CV 15.7 (*)     RDW-SD 48.4 (*)     All other components within normal limits   COMPREHENSIVE METABOLIC PANEL W/ REFLEX TO MG FOR LOW K - Abnormal; Notable for the following components:    Glucose 154 (*)     CREATININE 1.5 (*)     BUN 38 (*)     Alb 3.2 (*)     Total Bilirubin 0.2 (*)     All other components within normal limits   BRAIN NATRIURETIC PEPTIDE - Abnormal; Notable for the following components:    Pro-BNP 2264.0 (*)     All other components within normal limits   PROTIME-INR - Abnormal; Notable for the following components:    INR 2.46 (*)     All other components within normal limits   GLOMERULAR FILTRATION RATE, ESTIMATED - Abnormal; Notable for the following components:    Est, Glom Filt Rate 34 (*)     All other components within normal limits   POCT GLUCOSE - Abnormal; Notable for the following components:    POC Glucose 181 (*)     All other components within normal limits   POCT GLUCOSE - Normal   CULTURE, BLOOD 1   CULTURE, BLOOD 2   TROPONIN   APTT   LACTATE, SEPSIS   ANION GAP   OSMOLALITY   URINE RT REFLEX TO CULTURE   PROCALCITONIN       Radiologic studies results:  CT Head WO Contrast   Final Result    No evidence of an acute process. No change from prior. **This report has been created using voice recognition software. It may contain minor errors which are inherent in voice recognition technology. **      Final report electronically signed by Dr. Yogesh Toledo on 11/10/2020 1:22 PM      XR CHEST PORTABLE   Final Result   Nodular opacities are noted left greater than right. **This report has been created using voice recognition software. It may contain minor errors which are inherent in voice recognition technology. **      Final report electronically signed by Dr. Sonia Melendez on 11/10/2020 12:20 PM          ED Medications administered this visit: Medications   cefTRIAXone (ROCEPHIN) 1 g IVPB in 50 mL D5W minibag (has no administration in time range)   azithromycin (ZITHROMAX) 500 mg in D5W 250ml addavial (has no administration in time range)   0.9 % sodium chloride bolus (1,000 mLs Intravenous New Bag 11/10/20 1210)         ED COURSE     ED Course as of Nov 10 1545   Tue Nov 10, 2020   1151 Notified by nursing staff that patient did come from the Covid unit at her nursing home.    [CR]   1515 BUN(!): 38 [CR]   1516 Comprehensive Metabolic Panel w/ Reflex to MG(!):    Glucose 154(!)   Creatinine 1.5(!)   BUN 38(!)   Sodium 143   Potassium 4.5   Chloride 103   CO2 28   Calcium 8.7   AST 14   Alk Phos 78   Total Protein 6.7   Albumin 3.2(!)   Bilirubin 0.2(!)   ALT 14 [CR]      ED Course User Index  [CR] Cecil Siegel MD           MEDICATION CHANGES     New Prescriptions    No medications on file         FINAL DISPOSITION     Final diagnoses:   Pneumonia due to organism   COVID-19     Condition: condition: stable  Dispo: Admit to med/surg floor      This transcription was electronically signed. Parts of this transcriptions may have been dictated by use of voice recognition software and electronically transcribed, and parts may have been transcribed with the assistance of an ED scribe. The transcription may contain errors not detected in proofreading. Please refer to my supervising physician's documentation if my documentation differs.     Electronically Signed: Cecil Siegel, 11/10/20, 3:45 PM       Cecil Siegel MD  Resident  11/10/20 [id number]

## 2020-11-10 NOTE — H&P
Hospitalist - History & Physical      Patient: Bety Lovett    Unit/Bed:32/032A  YOB: 1940  MRN: [id number]   Acct: [de-identified]   PCP: Todd Ye DO    Date of Service: Pt seen/examined on 11/10/20  and Admitted to Inpatient with expected LOS greater than two midnights due to medical therapy. Chief Complaint:  AMS     Assessment and Plan:-  COVID 19: Patient tested positive for COVID-19 on 11/4. Patient is afebrile. Convalescent plasma was not ordered due to afebrile and having COVID for 7 days, Remdesvir (Pharmay to Dose) and Decadron (Day 1) ordered. Continue Vitamin C, Vitamin D, Zinc.   Acute respiratory failure, hypoxia: Patient's baseline is room air. Patient is currently requiring 2 L of oxygen. Incentive spirometry. Acapella. ELMER: Creatinine 1.5, on arrival.  Creatinine baseline ~0.9. Likely prerenal. IVF @ 50cc/hr. BMP in the AM.   Acute Metabolic Encephalopathy: Unknown etiology at this time. Potentially Covid, ELMER. Patient's baseline is alert and oriented x2, therefore this is going to be difficult to truly assess. HFpEF, chronic: Latest echo reads 60% EF. Daily weights. I's and O's. 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 Pharmacy to dose Coumadin. Atrial fibrillation, paroxysmal: Pharmacy to dose Coumadin. Rate controlled. IDDM: To new insulin regimen. Latest hemoglobin A1c 6.9. GERD: PPI. Essential hypertension: Continue home medications. History Of Present Illness:    **Due to patient's mentation and history of CVA with residual aphasia. History obtained by chart review and nursing. **     This is a chronically ill 79-year-old female who presented to [facility] emergency department on 11/10 due to altered mental status from the nursing home.   Nursing home called EMS around 10 AM.  Patient told ED provider that she was not sure why she was at the emergency department but stated \"she is feeling worse than usual.\"  Patient denies any pain at this time. Patient denies any alleviating or aggravating factors. Patient was from Covid unit at her nursing home. Hospitalist will admit the patient for further work-up. Past Medical History:        Diagnosis Date    Abdominal pain 12/14/2014    Atrial fibrillation (HCC)     CAD (coronary artery disease)     Cerebrovascular disease     Depression     Hyperlipidemia     Hypertension     Nausea and vomiting 4/13/2015    Self-care deficit for dressing and grooming     Self-care deficit for medication administration 2010    Type II or unspecified type diabetes mellitus without mention of complication, not stated as uncontrolled 2010    Unspecified cerebral artery occlusion with cerebral infarction        Past Surgical History:        Procedure Laterality Date    CARDIAC CATHETERIZATION  2004   [address]    COLONOSCOPY      COLONOSCOPY N/A 5/1/2019    COLONOSCOPY POLYPECTOMY SNARE/COLD BIOPSY performed by Miles Rivas MD at [address] REPLACEMENT  2008    Lt Total Knee    JOINT REPLACEMENT  2009    Rt Total Knee       Home Medications:   No current facility-administered medications on file prior to encounter. Current Outpatient Medications on File Prior to Encounter   Medication Sig Dispense Refill    insulin regular (HUMULIN R;NOVOLIN R) 100 UNIT/ML injection Inject 6 Units into the skin 2 times daily (before meals)      amLODIPine-benazepril (LOTREL) 10-40 MG per capsule Take 1 capsule by mouth daily      warfarin (COUMADIN) 5 MG tablet Take 5 mg by mouth      furosemide (LASIX) 20 MG tablet Take 20 mg by mouth 2 times daily      triamcinolone (KENALOG) 0.1 % cream Apply topically 2 times daily as needed Apply topically 2 times daily.  hydrocortisone 1 % cream Apply topically 2 times daily Apply topically 2 times daily.       hydrocortisone (ANUSOL-HC) 25 MG suppository Place 1 suppository rectally 2 times daily      insulin glargine (LANTUS SOLOSTAR) 100 UNIT/ML injection pen Inject 20 Units into the skin nightly (Patient taking differently: Inject 48 Units into the skin nightly ) 5 pen 3    carvedilol (COREG) 6.25 MG tablet Take 1 tablet by mouth 2 times daily 60 tablet 3    ferrous sulfate 325 (65 Fe) MG tablet Take 1 tablet by mouth 2 times daily (with meals) 30 tablet 3    docusate sodium (COLACE) 100 MG capsule Take 100 mg by mouth daily      insulin regular (HUMULIN R;NOVOLIN R) 100 UNIT/ML injection Inject into the skin 4 times daily (before meals and nightly) 151-200= 2 units, 201-250= 4 units, 251-300= 6 units, 301-350= 8 units, 351-400= 10 units, 401+= call MD      metFORMIN (GLUCOPHAGE) 500 MG tablet TAKE ONE TABLET BY MOUTH ONCE DAILY WITH BREAKFAST (Patient taking differently: 500 mg 2 times daily (with meals) ) 60 tablet 5    JANUVIA 100 MG tablet TAKE ONE TABLET BY MOUTH ONCE DAILY 90 tablet 3    simvastatin (ZOCOR) 20 MG tablet TAKE ONE TABLET BY MOUTH ONCE DAILY AT  NIGHT (Patient taking differently: 40 mg ) 90 tablet 3    oxybutynin (DITROPAN-XL) 10 MG extended release tablet TAKE ONE TABLET BY MOUTH ONCE DAILY 90 tablet 3    pantoprazole (PROTONIX) 40 MG tablet TAKE ONE TABLET BY MOUTH ONCE DAILY 90 tablet 3    citalopram (CELEXA) 10 MG tablet TAKE ONE TABLET BY MOUTH ONCE DAILY 90 tablet 3    Ascorbic Acid (VITAMIN C PO) Take 1 tablet by mouth three times a week       vitamin E 400 UNIT capsule Take 400 Units by mouth daily      calcium carbonate (OSCAL) 500 MG TABS tablet Take 500 mg by mouth Three times weekly      [DISCONTINUED] oxybutynin (DITROPAN XL) 10 MG CR tablet Take 1 tablet by mouth daily. 90 tablet 3    Glucagon HCl, rDNA, (GLUCAGEN HYPOKIT IJ) Inject 1 mg as directed as needed.  Indications: Extremely Low Blood Sugar      glucose blood VI test strips (ONE TOUCH TEST STRIPS) strip Test blood sugar with One Touch Ultra 2 QID and as BLOODU NEGATIVE 09/06/2018    SPECGRAV 1.030 09/06/2018    GLUCOSEU 100 06/01/2016       Radiology:   CT Head WO Contrast   Final Result    No evidence of an acute process. No change from prior. **This report has been created using voice recognition software. It may contain minor errors which are inherent in voice recognition technology. **      Final report electronically signed by Dr. Alexi Serrano on 11/10/2020 1:22 PM      XR CHEST PORTABLE   Final Result   Nodular opacities are noted left greater than right. **This report has been created using voice recognition software. It may contain minor errors which are inherent in voice recognition technology. **      Final report electronically signed by Dr. Cata Shepherd on 11/10/2020 12:20 PM        EKG: Sinus Rhythm. PVCs. Atrial Rate 65. Vent Rate 65.      Electronically signed by SHERRI Huang on 11/10/2020 at 5:20 PM

## 2020-11-10 NOTE — ED NOTES
Bed: 010A  Expected date:   Expected time:   Means of arrival: [facility]  Comments:  Sally Petersen  11/10/20 1024

## 2020-11-10 NOTE — ED NOTES
Patient resting quietly in cot with eyes closed. Shows no signs of distress. Medicated per MAR. Will continue to monitor.       Min Avery RN  11/10/20 1600

## 2020-11-10 NOTE — ED NOTES
Spoke with Renny Santiago LPN from [employer] who states that previous nurse stated patient was not verbally responding and pupils were sluggish so they called Dr. Jin El who instructed them to send patient to ED for evaluation. Jourdan Solitario states patient has history of stroke causing right sided weakness and aphasia. Patient tested positive for Covid at [facility] on 11/04/2020.      Wojciech Kent RN  11/10/20 [id number]

## 2020-11-10 NOTE — ED PROVIDER NOTES
[address] [facility]  EMERGENCY MEDICINE ATTENDING ATTESTATION      Evaluation of Cachorro Mendiola. Case discussed and care plan developed with resident physician. I agree with the resident physician documentation and plan as documented by him, except if my documentation differs. Patient seen, interviewed and examined by me. I reviewed the medical, surgical, family and social history, medications and allergies. I have reviewed the nursing documentation. I have reviewed the patient's vital signs and are normal per my interpretation. Pulsoxymetry is normal per my interpretation. Brief H&P   Patient brought in from nursing home for altered mental status. Per report given to the nurse on arrival patient is coming from the [facility] unit at the nursing home. Physical exam is notable for well appearing, obese, appears chronically ill, right-sided hemiparesis that is old per history, otherwise nonfocal exam except for disorientation. Medical Decision Making   MDM: Undifferentiated altered mental status, COVID-19 likely. Plan: I V line, labs, imaging, EKG, chest x-ray, observation. Patient will require to be admitted. Please see the resident physician completed note for final disposition except as documented on this attestation. I have reviewed and interpreted all available lab, radiology and ekg results available at the moment. Diagnosis, treatment and disposition plans were discussed and agreed upon by patient. This transcription was electronically signed. It was dictated by use of voice recognition software and electronically transcribed. The transcription may contain errors not detected in proofreading.      I performed direct supervision and was present for the critical portion following procedures: None  Critical care time on this case: None    Electronically signed by Roxanne Gutierrez MD on 11/10/20 at 1:37 PM EST       Roxanne Gutierrez MD  11/10/20 [address] [facility]

## 2020-11-11 LAB
ALBUMIN SERPL-MCNC: 2.9 G/DL (ref 3.5–5.1)
ALP BLD-CCNC: 72 U/L (ref 38–126)
ALT SERPL-CCNC: 15 U/L (ref 11–66)
ANION GAP SERPL CALCULATED.3IONS-SCNC: 12 MEQ/L (ref 8–16)
AST SERPL-CCNC: 14 U/L (ref 5–40)
BASOPHILS # BLD: 0.2 %
BASOPHILS ABSOLUTE: 0 THOU/MM3 (ref 0–0.1)
BILIRUB SERPL-MCNC: 0.2 MG/DL (ref 0.3–1.2)
BUN BLDV-MCNC: 34 MG/DL (ref 7–22)
CALCIUM SERPL-MCNC: 8.2 MG/DL (ref 8.5–10.5)
CHLORIDE BLD-SCNC: 103 MEQ/L (ref 98–111)
CO2: 24 MEQ/L (ref 23–33)
CREAT SERPL-MCNC: 1.3 MG/DL (ref 0.4–1.2)
EOSINOPHIL # BLD: 0 %
EOSINOPHILS ABSOLUTE: 0 THOU/MM3 (ref 0–0.4)
ERYTHROCYTE [DISTWIDTH] IN BLOOD BY AUTOMATED COUNT: 15.5 % (ref 11.5–14.5)
ERYTHROCYTE [DISTWIDTH] IN BLOOD BY AUTOMATED COUNT: 47.1 FL (ref 35–45)
GFR SERPL CREATININE-BSD FRML MDRD: 40 ML/MIN/1.73M2
GLUCOSE BLD-MCNC: 108 MG/DL (ref 70–108)
GLUCOSE BLD-MCNC: 115 MG/DL (ref 70–108)
GLUCOSE BLD-MCNC: 139 MG/DL (ref 70–108)
GLUCOSE BLD-MCNC: 148 MG/DL (ref 70–108)
GLUCOSE BLD-MCNC: 193 MG/DL (ref 70–108)
HCT VFR BLD CALC: 37.5 % (ref 37–47)
HEMOGLOBIN: 11.7 GM/DL (ref 12–16)
IMMATURE GRANS (ABS): 0.04 THOU/MM3 (ref 0–0.07)
IMMATURE GRANULOCYTES: 0.6 %
INR BLD: 2.59 (ref 0.85–1.13)
LYMPHOCYTES # BLD: 16.3 %
LYMPHOCYTES ABSOLUTE: 1.1 THOU/MM3 (ref 1–4.8)
MCH RBC QN AUTO: 26.2 PG (ref 26–33)
MCHC RBC AUTO-ENTMCNC: 31.2 GM/DL (ref 32.2–35.5)
MCV RBC AUTO: 84.1 FL (ref 81–99)
MONOCYTES # BLD: 3.2 %
MONOCYTES ABSOLUTE: 0.2 THOU/MM3 (ref 0.4–1.3)
NUCLEATED RED BLOOD CELLS: 0 /100 WBC
OSMOLALITY CALCULATION: 287.9 MOSMOL/KG (ref 275–300)
PLATELET # BLD: 228 THOU/MM3 (ref 130–400)
PMV BLD AUTO: 10.9 FL (ref 9.4–12.4)
POTASSIUM REFLEX MAGNESIUM: 4.4 MEQ/L (ref 3.5–5.2)
RBC # BLD: 4.46 MILL/MM3 (ref 4.2–5.4)
SEG NEUTROPHILS: 79.7 %
SEGMENTED NEUTROPHILS ABSOLUTE COUNT: 5.3 THOU/MM3 (ref 1.8–7.7)
SODIUM BLD-SCNC: 139 MEQ/L (ref 135–145)
TOTAL PROTEIN: 6.2 G/DL (ref 6.1–8)
WBC # BLD: 6.6 THOU/MM3 (ref 4.8–10.8)

## 2020-11-11 PROCEDURE — 85610 PROTHROMBIN TIME: CPT

## 2020-11-11 PROCEDURE — 80053 COMPREHEN METABOLIC PANEL: CPT

## 2020-11-11 PROCEDURE — 94761 N-INVAS EAR/PLS OXIMETRY MLT: CPT

## 2020-11-11 PROCEDURE — 1200000003 HC TELEMETRY R&B

## 2020-11-11 PROCEDURE — 85025 COMPLETE CBC W/AUTO DIFF WBC: CPT

## 2020-11-11 PROCEDURE — 2700000000 HC OXYGEN THERAPY PER DAY

## 2020-11-11 PROCEDURE — 6370000000 HC RX 637 (ALT 250 FOR IP): Performed by: PHYSICIAN ASSISTANT

## 2020-11-11 PROCEDURE — 36415 COLL VENOUS BLD VENIPUNCTURE: CPT

## 2020-11-11 PROCEDURE — 6370000000 HC RX 637 (ALT 250 FOR IP)

## 2020-11-11 PROCEDURE — 2580000003 HC RX 258: Performed by: PHYSICIAN ASSISTANT

## 2020-11-11 PROCEDURE — 6360000002 HC RX W HCPCS: Performed by: PHYSICIAN ASSISTANT

## 2020-11-11 PROCEDURE — 99233 SBSQ HOSP IP/OBS HIGH 50: CPT | Performed by: HOSPITALIST

## 2020-11-11 PROCEDURE — 82948 REAGENT STRIP/BLOOD GLUCOSE: CPT

## 2020-11-11 PROCEDURE — 94669 MECHANICAL CHEST WALL OSCILL: CPT

## 2020-11-11 RX ORDER — DEXTROSE MONOHYDRATE 50 MG/ML
100 INJECTION, SOLUTION INTRAVENOUS PRN
Status: DISCONTINUED | OUTPATIENT
Start: 2020-11-11 | End: 2020-11-19 | Stop reason: HOSPADM

## 2020-11-11 RX ORDER — WARFARIN SODIUM 4 MG/1
4 TABLET ORAL
Status: COMPLETED | OUTPATIENT
Start: 2020-11-11 | End: 2020-11-11

## 2020-11-11 RX ORDER — DEXTROSE MONOHYDRATE 25 G/50ML
12.5 INJECTION, SOLUTION INTRAVENOUS PRN
Status: DISCONTINUED | OUTPATIENT
Start: 2020-11-11 | End: 2020-11-19 | Stop reason: HOSPADM

## 2020-11-11 RX ORDER — NICOTINE POLACRILEX 4 MG
15 LOZENGE BUCCAL PRN
Status: DISCONTINUED | OUTPATIENT
Start: 2020-11-11 | End: 2020-11-19 | Stop reason: HOSPADM

## 2020-11-11 RX ADMIN — CARVEDILOL 6.25 MG: 6.25 TABLET, FILM COATED ORAL at 21:15

## 2020-11-11 RX ADMIN — Medication 50 MG: at 08:08

## 2020-11-11 RX ADMIN — CITALOPRAM 10 MG: 20 TABLET, FILM COATED ORAL at 08:09

## 2020-11-11 RX ADMIN — INSULIN LISPRO 4 UNITS: 100 INJECTION, SOLUTION INTRAVENOUS; SUBCUTANEOUS at 12:59

## 2020-11-11 RX ADMIN — OXYBUTYNIN CHLORIDE 10 MG: 10 TABLET, EXTENDED RELEASE ORAL at 08:08

## 2020-11-11 RX ADMIN — ATORVASTATIN CALCIUM 20 MG: 20 TABLET, FILM COATED ORAL at 08:09

## 2020-11-11 RX ADMIN — CARVEDILOL 6.25 MG: 6.25 TABLET, FILM COATED ORAL at 08:09

## 2020-11-11 RX ADMIN — PANTOPRAZOLE SODIUM 40 MG: 40 TABLET, DELAYED RELEASE ORAL at 08:08

## 2020-11-11 RX ADMIN — DEXAMETHASONE 6 MG: 4 TABLET ORAL at 08:08

## 2020-11-11 RX ADMIN — LATANOPROST 1 DROP: 50 SOLUTION OPHTHALMIC at 21:16

## 2020-11-11 RX ADMIN — FUROSEMIDE 20 MG: 40 TABLET ORAL at 08:08

## 2020-11-11 RX ADMIN — LISINOPRIL 40 MG: 40 TABLET ORAL at 08:08

## 2020-11-11 RX ADMIN — AMLODIPINE BESYLATE 10 MG: 10 TABLET ORAL at 08:09

## 2020-11-11 RX ADMIN — OXYCODONE HYDROCHLORIDE AND ACETAMINOPHEN 500 MG: 500 TABLET ORAL at 08:08

## 2020-11-11 RX ADMIN — INSULIN LISPRO 1 UNITS: 100 INJECTION, SOLUTION INTRAVENOUS; SUBCUTANEOUS at 00:56

## 2020-11-11 RX ADMIN — FERROUS SULFATE TAB 325 MG (65 MG ELEMENTAL FE) 325 MG: 325 (65 FE) TAB at 08:08

## 2020-11-11 RX ADMIN — DOCUSATE SODIUM 100 MG: 100 CAPSULE, LIQUID FILLED ORAL at 08:08

## 2020-11-11 RX ADMIN — LATANOPROST 1 DROP: 50 SOLUTION OPHTHALMIC at 00:56

## 2020-11-11 RX ADMIN — Medication 2000 UNITS: at 08:08

## 2020-11-11 RX ADMIN — INSULIN LISPRO 2 UNITS: 100 INJECTION, SOLUTION INTRAVENOUS; SUBCUTANEOUS at 12:59

## 2020-11-11 RX ADMIN — SODIUM CHLORIDE: 9 INJECTION, SOLUTION INTRAVENOUS at 17:36

## 2020-11-11 RX ADMIN — WARFARIN SODIUM 4 MG: 4 TABLET ORAL at 17:36

## 2020-11-11 ASSESSMENT — PAIN SCALES - WONG BAKER
WONGBAKER_NUMERICALRESPONSE: 0

## 2020-11-11 ASSESSMENT — PAIN SCALES - GENERAL
PAINLEVEL_OUTOF10: 0
PAINLEVEL_OUTOF10: 0

## 2020-11-11 NOTE — PROGRESS NOTES
Clinical Pharmacy Note    Marlys Alexandra is a 68 y.o. female for whom pharmacy has been asked to manage warfarin therapy. Reason for Admission: COVID    Consulting Physician: Justo  Warfarin dose prior to admission: 4 mg Monday-Friday and 5 mg on Saturday and Sunday  Warfarin indication: afib  Target INR range: 2-3   Outpatient warfarin provider: Francois Y[facility] [place]    Past Medical History:   Diagnosis Date    Abdominal pain 12/14/2014    Atrial fibrillation (HCC)     CAD (coronary artery disease)     Cerebrovascular disease     Depression     Hyperlipidemia     Hypertension     Nausea and vomiting 4/13/2015    Self-care deficit for dressing and grooming     Self-care deficit for medication administration 2010    Type II or unspecified type diabetes mellitus without mention of complication, not stated as uncontrolled 2010    Unspecified cerebral artery occlusion with cerebral infarction                 Recent Labs     11/10/20  1318   INR 2.46*     Recent Labs     11/10/20  1318 11/10/20  1902   HGB 12.1 11.7*   HCT 39.0 38.2    245       Current warfarin drug-drug interactions: celexa, dexamethasone      Date INR Warfarin Dose   11/10/20 2.46 4 mg                                   Daily PT/INR until stable within therapeutic range. Thank you for the consult.

## 2020-11-11 NOTE — ED NOTES
Pt medicated per MAR . Vs reassessed. Crush pts medications for easier swallowing and five with water. RR Reg.  Pt sitting up in bed      Doris Duque RN  11/10/20 5081

## 2020-11-11 NOTE — PROGRESS NOTES
Clinical Pharmacy Note    Warfarin consult follow-up    Recent Labs     11/11/20  0443   INR 2.59*     Recent Labs     11/10/20  1318 11/10/20  1902 11/11/20  0443   HGB 12.1 11.7* 11.7*   HCT 39.0 38.2 37.5    245 228       Significant Drug-Drug Interactions:  New warfarin drug-drug interactions: vitamin C  Discontinued drug-drug interactions: none  Current warfarin drug-drug interactions: citalopram, dexamethasone, vitamin C        Date INR Warfarin Dose   11/10/20 2.46 4 mg   11/11/2020  2.59  4 mg                                                Notes:                     Daily PT/INR until stable within therapeutic range.        Danyell Niño, PharmD   11/11/2020, 4:59 PM

## 2020-11-11 NOTE — ED NOTES
Assumed care at this time. Bedside report received from [facility] . Pt straight cathed for urine and repositioned in bed. VS reassessed. RR Reg.  Will continue to monitor      Bonnie Gordon RN  11/10/20 1943

## 2020-11-11 NOTE — PROGRESS NOTES
Hospitalist Progress Note      Patient:  Shantell Mace    Unit/Bed:8B-26/026-A  YOB: 1940  MRN: [id number]   Acct: [de-identified]   PCP: Didi Juares DO  Date of Admission: 11/10/2020    Assessment/Plan:    1. COVID 19: Patient tested positive for COVID-19 on 11/4. Patient is afebrile. on Remdesvir (Pharmay to Dose) and Dexa; ok to continue Vitamin C, Vitamin D, Zinc. Procal WNLs. prothrombotic markers WNLs. On Warfarin w/ INR within therapeutic range  2. Query Acute respiratory failure, hypoxia: Patient's baseline is room air. Patient is currently requiring 2 L of oxygen w/ SaO2 in high 90s. No documented hypoxia on Epic. RN aware to wean aggressively to SaO2 greater than 90%. Incentive spirometry. Acapella. Kim consider stopping Remdesivir/Dexa when back on R/A  3. ELMER: Creatinine 1.5, on arrival.  Creatinine baseline ~0.9. Likely prerenal. Improved w/ IVF w/ Cr down to 1.3. Held ACEi. Avoid nephrotoxins. Continue w/ NS at 50cc/hr. Will trend Cr  4. Query Acute Metabolic Encephalopathy:  toxic/metabolic encephalopathy? Resolved. Pt has hx of Stroke w/ residual aphasia. apprears at baseline A&O x3    5. 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 Stable at baseline  6. Hx of Atrial fibrillation, paroxysmal: NSR/CVR on BB. On warfarin, Pharmacy to dose   7. IDDM: Latest hemoglobin A1c 6.9. on MDI w/ BS well-controlled. CCM  8. GERD: PPI. 9. Hx of Essential hypertension: BP in 140s. Home meds resumed except ACEi which held d/t ELMER. Will monitor  10. Morbid obesity w/ BMI 41.13  11. PT/OT to see    Chief Complaint: AMS    Initial H and P:-    Admitted for management of AMS and COVID 19      Subjective (past 24 hours):   Feeling improved. SOB decreased.  Denies CP/palpitation/fever/chills/GI or  symptoms      Past medical history, family history, social history and allergies reviewed again and is unchanged since admission. ROS (12 point review of systems completed. Pertinent positives noted. Otherwise ROS is negative)     Medications:  Reviewed    Infusion Medications    sodium chloride 50 mL/hr at 11/10/20 2104     Scheduled Medications    [START ON 11/12/2020] calcium carbonate  500 mg Oral Once per day on Mon Wed Fri    carvedilol  6.25 mg Oral BID    citalopram  10 mg Oral Daily    docusate sodium  100 mg Oral Daily    atorvastatin  20 mg Oral Daily    oxybutynin  1 tablet Oral Daily    pantoprazole  40 mg Oral Daily    furosemide  20 mg Oral BID    insulin lispro  0-12 Units Subcutaneous TID WC    insulin lispro  0-6 Units Subcutaneous Nightly    ferrous sulfate  325 mg Oral Daily    dexamethasone  6 mg Oral Daily    Vitamin D  2,000 Units Oral Daily    vitamin C  500 mg Oral Daily    zinc sulfate  50 mg Oral Daily    warfarin (COUMADIN) daily dosing (placeholder)   Other RX Placeholder    amLODIPine  10 mg Oral Daily    lisinopril  40 mg Oral Daily    insulin glargine  30 Units Subcutaneous Nightly    insulin glargine  10 Units Subcutaneous Daily    insulin lispro  4 Units Subcutaneous TID     latanoprost  1 drop Both Eyes Nightly    insulin regular         PRN Meds: acetaminophen **OR** acetaminophen      Intake/Output Summary (Last 24 hours) at 11/11/2020 0729  Last data filed at 11/11/2020 0347  Gross per 24 hour   Intake 327.51 ml   Output 350 ml   Net -22.49 ml       Diet:  No diet orders on file    Exam:  BP (!) 145/65   Pulse 60   Temp 97.6 °F (36.4 °C) (Oral)   Resp 20   Ht 5' 2\" (1.575 m)   Wt 224 lb 13.9 oz (102 kg)   SpO2 97%   BMI 41.13 kg/m²   General appearance: Obese, expressive aphasia. No apparent distress, appears stated age and cooperative. HEENT: Pupils equal, round, and reactive to light. Conjunctivae/corneas clear. Neck: Supple, with full range of motion. No jugular venous distention. Trachea midline. Respiratory:  Normal respiratory effort.  Clear to Lab Results   Component Value Date    NITRU NEGATIVE 11/10/2020    WBCUA 5-9 11/10/2020    WBCUA 2-4 11/29/2011    BACTERIA NONE SEEN 11/10/2020    RBCUA NONE SEEN 11/10/2020    BLOODU NEGATIVE 11/10/2020    SPECGRAV 1.030 09/06/2018    GLUCOSEU NEGATIVE 11/10/2020       Radiology:  CT Head WO Contrast   Final Result    No evidence of an acute process. No change from prior. **This report has been created using voice recognition software. It may contain minor errors which are inherent in voice recognition technology. **      Final report electronically signed by Dr. Lencho Rubi on 11/10/2020 1:22 PM      XR CHEST PORTABLE   Final Result   Nodular opacities are noted left greater than right. **This report has been created using voice recognition software. It may contain minor errors which are inherent in voice recognition technology. **      Final report electronically signed by Dr. Genaro Canavan on 11/10/2020 12:20 PM        Ct Head Wo Contrast    Result Date: 11/10/2020  PROCEDURE: CT HEAD WO CONTRAST CLINICAL INFORMATION: AMS. COVID 19 positive COMPARISON: Head CT 6/1/2016. TECHNIQUE: Noncontrast 5 mm axial images were obtained through the brain. Sagittal and coronal reconstructions were obtained. All CT scans at this facility use dose modulation, iterative reconstruction, and/or weight-based dosing when appropriate to reduce radiation dose to as low as reasonably achievable. FINDINGS: There is no hemorrhage. There is stable moderate-sized old infarct in the left frontoparietal junction at the level the vertex. There is associated ex vacuo dilation of the body of the left lateral ventricle. There are no new areas of abnormal attenuation. There is no hydrocephalus, midline shift or mass effect. There are vascular calcifications. The paranasal sinuses and mastoid air cells are normally aerated. There is no suspicious calvarial abnormality. No evidence of an acute process.  No

## 2020-11-11 NOTE — PROGRESS NOTES
Pharmacy Medication History Note      List of current medications patient is taking is complete. Source of information: F medication list, [facility]     Changes made to medication list:  Medications removed (include reason, ex. therapy complete or physician discontinued):  Lantus 100unit/mL, changed to Basaglar  Calcium carb 500mg tabs  Ferrous sulf 325mg BID  Metformin 500mg daily  Insulin R sliding scale  Insulin R BID  Ascorbic acid PO  Simvastatin 20mg  Anusol HC 25mg suppository  Hydrocortisone 1% cream  Januvia 100mg  Triamcinolone 0.1% cream    Medications added/doses adjusted:  Warfarin 5mg changed to twice weekly Sat & Sun  Warfarin 4mg 5 times weekly Mon-Fri  APAP 325mg 2 tabs Q4H PRN pain  Atorvastatin 20mg nightly  Basaglar 100 units/mL, 10 units AM  Basaglar 100units/mL, 30 units HS  Tums chewable 500mg three times weekly Tue/Thurs/Sat  Ferrous sulf 325mg daily  Dexamethasone 6mg daily 11/5-11/15  Potassium chloride ER 20mEq daily  Latanoprost 0.005%, 1 drop into both eyes HS  Metformin 500mg BID  Novolog 100units/mL, 4 units TID  Albuterol 90mcg inhaler, 2 puffs Q4H PRN SOB  Tradjenta 5mg daily  Ascorbic acid 500mg three times weekly Mon/Thur/Sun  Vitamin D3 units daily x 2 months  Zinc 50mg daily x 1 month    Other notes (ex. Recent course of antibiotics, Coumadin dosing):  Called [facility] to confirm last dose of warfarin as no MAR was sent with patient. Last dose was yesterday evening (11/9)  Denies use of other OTC or herbal medications.       Electronically signed by Irene Cortez on 11/10/2020 at 8:06 PM

## 2020-11-11 NOTE — PROGRESS NOTES
Remdesivir Evaluation Note    Cachorro Mendiola does NOT meet criteria for initiation of remdesivir under the emergency use authorization (EUA) based on the following:  Known or suspected COVID-19  Severe disease (SpO2 ? 94% on RA, requiring supplemental O2, or requiring invasive mechanical ventilation) - DOES NOT MEET O2 saturation 96% on RA  Acceptable renal function  CrCl ? 30 ml/min based on SCr obtained prior to initiation OR   CrCl < 30 ml/min but the potential benefit of remdesivir outweighs the risk  Acceptable hepatic function (ALT within 5 times ULN)      Pablo Parker, Dyan 11/10/2020 8:55 PM

## 2020-11-11 NOTE — PROGRESS NOTES
Pt admitted to  8AB28 via from ED and via cart/stretcher from ED. Complaints: COVID, shortness of breath, and AMS. IV normal saline infusing into the antecubital left, condition patent and no redness at a rate of 50 mls/ hour. IV site free of s/s of infection or infiltration. Vital signs obtained. Assessment and data collection initiated. Two nurse skin assessment performed by GAB Grover RN and GAB Aguirre RN. Oriented to room. Policies and procedures for 8AB explained. All questions answered with no further questions at this time. Fall prevention and safety brochure discussed with patient. Bed alarm on. Call light in reach. Oriented to room. Damir Cano RN 11/10/2020 11:57 PM     Explained patients right to have family, representative or physician notified of their admission. Patient has Declined for physician to be notified. Patient has Declined for family/representative to be notified. Patient would like family notified once per shift?  No

## 2020-11-11 NOTE — ED NOTES
ED nurse-to-nurse bedside report    Chief Complaint   Patient presents with    Altered Mental Status      LOC: alert to only name  Vital signs   Vitals:    11/10/20 1210 11/10/20 1559 11/10/20 1819 11/10/20 1928   BP: (!) 133/59 (!) 136/56 (!) 158/61 (!) 149/76   Pulse: 65 61 58 72   Resp: 22 22 22 22   Temp:       TempSrc:       SpO2: 96% 97% 98% 98%   Weight:  220 lb (99.8 kg)     Height:  5' 2\" (1.575 m)        Pain:    Pain Interventions: none  Pain Goal:  Oxygen: Yes    Current needs required bed alarm   Telemetry: Yes  LDAs:   Peripheral IV 11/10/20 Left Forearm (Active)   Site Assessment Clean;Dry; Intact 11/10/20 1600   Line Status Normal saline locked; Infusing 11/10/20 1600   Dressing Status Clean;Dry; Intact 11/10/20 1600   Dressing Intervention New 11/10/20 1209     Continuous Infusions:    sodium chloride       Mobility: Fully dependent  Leonidas Fall Risk Score:    Fall Risk 7/3/2018 6/20/2017 10/25/2016 7/22/2014   2 or more falls in past year? no no no no   Fall with injury in past year? no no no no     Fall Interventions: call light within reach, bed rails up  Report given to: Aundrea Sherman, [address] Nicholson  11/10/20 1940

## 2020-11-12 LAB
ALBUMIN SERPL-MCNC: 3.1 G/DL (ref 3.5–5.1)
ALBUMIN SERPL-MCNC: 3.1 G/DL (ref 3.5–5.1)
ALP BLD-CCNC: 79 U/L (ref 38–126)
ALP BLD-CCNC: 80 U/L (ref 38–126)
ALT SERPL-CCNC: 33 U/L (ref 11–66)
ALT SERPL-CCNC: 33 U/L (ref 11–66)
ANION GAP SERPL CALCULATED.3IONS-SCNC: 10 MEQ/L (ref 8–16)
ANION GAP SERPL CALCULATED.3IONS-SCNC: 10 MEQ/L (ref 8–16)
AST SERPL-CCNC: 32 U/L (ref 5–40)
AST SERPL-CCNC: 33 U/L (ref 5–40)
BASOPHILS # BLD: 0.1 %
BASOPHILS ABSOLUTE: 0 THOU/MM3 (ref 0–0.1)
BILIRUB SERPL-MCNC: 0.2 MG/DL (ref 0.3–1.2)
BILIRUB SERPL-MCNC: 0.2 MG/DL (ref 0.3–1.2)
BILIRUBIN DIRECT: < 0.2 MG/DL (ref 0–0.3)
BUN BLDV-MCNC: 32 MG/DL (ref 7–22)
BUN BLDV-MCNC: 33 MG/DL (ref 7–22)
CALCIUM SERPL-MCNC: 8.1 MG/DL (ref 8.5–10.5)
CALCIUM SERPL-MCNC: 8.6 MG/DL (ref 8.5–10.5)
CHLORIDE BLD-SCNC: 102 MEQ/L (ref 98–111)
CHLORIDE BLD-SCNC: 105 MEQ/L (ref 98–111)
CO2: 25 MEQ/L (ref 23–33)
CO2: 29 MEQ/L (ref 23–33)
CREAT SERPL-MCNC: 1.2 MG/DL (ref 0.4–1.2)
CREAT SERPL-MCNC: 1.4 MG/DL (ref 0.4–1.2)
EOSINOPHIL # BLD: 0 %
EOSINOPHILS ABSOLUTE: 0 THOU/MM3 (ref 0–0.4)
ERYTHROCYTE [DISTWIDTH] IN BLOOD BY AUTOMATED COUNT: 15.6 % (ref 11.5–14.5)
ERYTHROCYTE [DISTWIDTH] IN BLOOD BY AUTOMATED COUNT: 47.6 FL (ref 35–45)
GFR SERPL CREATININE-BSD FRML MDRD: 36 ML/MIN/1.73M2
GFR SERPL CREATININE-BSD FRML MDRD: 43 ML/MIN/1.73M2
GLUCOSE BLD-MCNC: 124 MG/DL (ref 70–108)
GLUCOSE BLD-MCNC: 145 MG/DL (ref 70–108)
GLUCOSE BLD-MCNC: 150 MG/DL (ref 70–108)
GLUCOSE BLD-MCNC: 80 MG/DL (ref 70–108)
GLUCOSE BLD-MCNC: 81 MG/DL (ref 70–108)
GLUCOSE BLD-MCNC: 87 MG/DL (ref 70–108)
HCT VFR BLD CALC: 39.1 % (ref 37–47)
HEMOGLOBIN: 12.1 GM/DL (ref 12–16)
IMMATURE GRANS (ABS): 0.05 THOU/MM3 (ref 0–0.07)
IMMATURE GRANULOCYTES: 0.5 %
INR BLD: 2.84 (ref 0.85–1.13)
LYMPHOCYTES # BLD: 12.7 %
LYMPHOCYTES ABSOLUTE: 1.4 THOU/MM3 (ref 1–4.8)
MCH RBC QN AUTO: 26.1 PG (ref 26–33)
MCHC RBC AUTO-ENTMCNC: 30.9 GM/DL (ref 32.2–35.5)
MCV RBC AUTO: 84.3 FL (ref 81–99)
MONOCYTES # BLD: 7.3 %
MONOCYTES ABSOLUTE: 0.8 THOU/MM3 (ref 0.4–1.3)
NUCLEATED RED BLOOD CELLS: 0 /100 WBC
PLATELET # BLD: 262 THOU/MM3 (ref 130–400)
PMV BLD AUTO: 10.8 FL (ref 9.4–12.4)
POTASSIUM REFLEX MAGNESIUM: 4.5 MEQ/L (ref 3.5–5.2)
POTASSIUM SERPL-SCNC: 4 MEQ/L (ref 3.5–5.2)
RBC # BLD: 4.64 MILL/MM3 (ref 4.2–5.4)
SEG NEUTROPHILS: 79.4 %
SEGMENTED NEUTROPHILS ABSOLUTE COUNT: 8.5 THOU/MM3 (ref 1.8–7.7)
SODIUM BLD-SCNC: 137 MEQ/L (ref 135–145)
SODIUM BLD-SCNC: 144 MEQ/L (ref 135–145)
TOTAL PROTEIN: 6 G/DL (ref 6.1–8)
TOTAL PROTEIN: 6.1 G/DL (ref 6.1–8)
WBC # BLD: 10.7 THOU/MM3 (ref 4.8–10.8)

## 2020-11-12 PROCEDURE — 94669 MECHANICAL CHEST WALL OSCILL: CPT

## 2020-11-12 PROCEDURE — 2580000003 HC RX 258: Performed by: HOSPITALIST

## 2020-11-12 PROCEDURE — 94761 N-INVAS EAR/PLS OXIMETRY MLT: CPT

## 2020-11-12 PROCEDURE — 92610 EVALUATE SWALLOWING FUNCTION: CPT

## 2020-11-12 PROCEDURE — 82948 REAGENT STRIP/BLOOD GLUCOSE: CPT

## 2020-11-12 PROCEDURE — 85610 PROTHROMBIN TIME: CPT

## 2020-11-12 PROCEDURE — 6370000000 HC RX 637 (ALT 250 FOR IP): Performed by: PHYSICIAN ASSISTANT

## 2020-11-12 PROCEDURE — 36415 COLL VENOUS BLD VENIPUNCTURE: CPT

## 2020-11-12 PROCEDURE — 2700000000 HC OXYGEN THERAPY PER DAY

## 2020-11-12 PROCEDURE — 80053 COMPREHEN METABOLIC PANEL: CPT

## 2020-11-12 PROCEDURE — 85025 COMPLETE CBC W/AUTO DIFF WBC: CPT

## 2020-11-12 PROCEDURE — 6370000000 HC RX 637 (ALT 250 FOR IP)

## 2020-11-12 PROCEDURE — 99233 SBSQ HOSP IP/OBS HIGH 50: CPT | Performed by: HOSPITALIST

## 2020-11-12 PROCEDURE — 6360000002 HC RX W HCPCS: Performed by: PHYSICIAN ASSISTANT

## 2020-11-12 PROCEDURE — 1200000003 HC TELEMETRY R&B

## 2020-11-12 RX ORDER — WARFARIN SODIUM 4 MG/1
4 TABLET ORAL
Status: COMPLETED | OUTPATIENT
Start: 2020-11-12 | End: 2020-11-12

## 2020-11-12 RX ORDER — SODIUM CHLORIDE 450 MG/100ML
INJECTION, SOLUTION INTRAVENOUS CONTINUOUS
Status: DISCONTINUED | OUTPATIENT
Start: 2020-11-12 | End: 2020-11-14

## 2020-11-12 RX ADMIN — AMLODIPINE BESYLATE 10 MG: 10 TABLET ORAL at 07:48

## 2020-11-12 RX ADMIN — CARVEDILOL 6.25 MG: 6.25 TABLET, FILM COATED ORAL at 07:48

## 2020-11-12 RX ADMIN — OXYBUTYNIN CHLORIDE 10 MG: 10 TABLET, EXTENDED RELEASE ORAL at 07:48

## 2020-11-12 RX ADMIN — SODIUM CHLORIDE: 4.5 INJECTION, SOLUTION INTRAVENOUS at 10:51

## 2020-11-12 RX ADMIN — DOCUSATE SODIUM 100 MG: 100 CAPSULE, LIQUID FILLED ORAL at 07:48

## 2020-11-12 RX ADMIN — OXYCODONE HYDROCHLORIDE AND ACETAMINOPHEN 500 MG: 500 TABLET ORAL at 07:49

## 2020-11-12 RX ADMIN — LATANOPROST 1 DROP: 50 SOLUTION OPHTHALMIC at 20:54

## 2020-11-12 RX ADMIN — CITALOPRAM 10 MG: 20 TABLET, FILM COATED ORAL at 07:48

## 2020-11-12 RX ADMIN — WARFARIN SODIUM 4 MG: 4 TABLET ORAL at 19:50

## 2020-11-12 RX ADMIN — CARVEDILOL 6.25 MG: 6.25 TABLET, FILM COATED ORAL at 20:54

## 2020-11-12 RX ADMIN — FERROUS SULFATE TAB 325 MG (65 MG ELEMENTAL FE) 325 MG: 325 (65 FE) TAB at 07:48

## 2020-11-12 RX ADMIN — ATORVASTATIN CALCIUM 20 MG: 20 TABLET, FILM COATED ORAL at 07:48

## 2020-11-12 RX ADMIN — Medication 50 MG: at 07:48

## 2020-11-12 RX ADMIN — DEXAMETHASONE 6 MG: 4 TABLET ORAL at 07:48

## 2020-11-12 RX ADMIN — Medication 2000 UNITS: at 07:49

## 2020-11-12 RX ADMIN — PANTOPRAZOLE SODIUM 40 MG: 40 TABLET, DELAYED RELEASE ORAL at 07:48

## 2020-11-12 ASSESSMENT — PAIN SCALES - GENERAL
PAINLEVEL_OUTOF10: 0
PAINLEVEL_OUTOF10: 0

## 2020-11-12 NOTE — PROGRESS NOTES
Hospitalist Progress Note      Patient:  Kari Kaminski    Unit/Bed:8B-26/026-A  YOB: 1940  MRN: [id number]   Acct: [de-identified]   PCP: Gerardo Agrawal DO  Date of Admission: 11/10/2020    Assessment/Plan:    1. COVID 19: Patient tested positive for COVID-19 on 11/4. Patient is afebrile. on Remdesvir (Pharmay to Dose) and Dexa; ok to continue Vitamin C, Vitamin D, Zinc. Procal WNLs. prothrombotic markers WNLs. On Warfarin w/ INR within therapeutic range  11/12: improving. Down to 1 lpm via NC w./ SaO2 in high 90s. RN aware to wean off O2. . Remdesivir and Dexa can be d/c'ed. Addendum: noted pt transferred to  after cleared IC service w/o my knowledge. Query Acute respiratory failure, hypoxia: Patient's baseline is room air. Patient is currently requiring 2 L of oxygen w/ SaO2 in high 90s. No documented hypoxia on Epic. RN aware to wean aggressively to SaO2 greater than 90%. Incentive spirometry. Acapella. Kim consider stopping Remdesivir/Dexa when back on R/A  2. ELMER: Creatinine 1.5, on arrival.  Creatinine baseline ~0.9. Likely prerenal. Improved w/ IVF w/ Cr down to 1.3. Held ACEi. Avoid nephrotoxins. Continue w/ NS at 50cc/hr. Will trend Cr  11/12: Cr 1.4 likely d/t inadequate PO intake; switched fluid to 0.45% NS at 75 cc/hr. Will trend BMP BID and reassess response  3. Query Acute Metabolic Encephalopathy:  toxic/metabolic encephalopathy? Resolved. Pt has hx of Stroke w/ residual aphasia. apprears at baseline A&O x3    4. 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 Stable at baseline  5. Hx of Atrial fibrillation, paroxysmal: NSR/CVR on BB. On warfarin, Pharmacy to dose   6. IDDM: Latest hemoglobin A1c 6.9. on MDI w/ BS well-controlled. CCM  7. GERD: PPI. 8. Hx of Essential hypertension: BP in 140s. Home meds resumed except ACEi which held d/t ELMER. Will monitor  11/12: fairly controlled.  CCM and 681.34 ml       Diet:  No diet orders on file    Exam:  BP (!) 150/63   Pulse 61   Temp 98.1 °F (36.7 °C) (Oral)   Resp 20   Ht 5' 2\" (1.575 m)   Wt 224 lb 13.9 oz (102 kg)   SpO2 96%   BMI 41.13 kg/m²   General appearance: Obese, expressive aphasia. No apparent distress, appears stated age and cooperative. HEENT: Pupils equal, round, and reactive to light. Conjunctivae/corneas clear. Neck: Supple, with full range of motion. No jugular venous distention. Trachea midline. Respiratory:  Normal respiratory effort. Clear to auscultation, bilaterally without Rales/Wheezes/Rhonchi. Cardiovascular: Regular rate and rhythm with normal S1/S2 without murmurs, rubs or gallops. Abdomen: Soft, non-tender, non-distended with normal bowel sounds. Musculoskeletal: passive and active ROM x 4 extremities. Skin: Skin color, texture, turgor normal.  No rashes or lesions. Neurologic:  R-sided hemiparesis  Psychiatric: Alert and oriented, thought content appropriate, normal insight  Capillary Refill: Brisk,< 3 seconds   Peripheral Pulses: +2 palpable, equal bilaterally     Labs:   Recent Labs     11/10/20  1902 11/11/20 0443 11/12/20  0609   WBC 7.6 6.6 10.7   HGB 11.7* 11.7* 12.1   HCT 38.2 37.5 39.1    228 262     Recent Labs     11/10/20  1902 11/11/20  0443 11/12/20  0609    139 144   K 4.8 4.4 4.5    103 105   CO2 22* 24 29   BUN 37* 34* 33*   CREATININE 1.1 1.3* 1.4*   CALCIUM 8.3* 8.2* 8.6     Recent Labs     11/10/20  1902 11/11/20  0443 11/12/20  0609   AST 13 14 32   ALT 15 15 33   BILITOT 0.2* 0.2* 0.2*   ALKPHOS 78 72 79     Recent Labs     11/10/20  1318 11/11/20  0443 11/12/20  0609   INR 2.46* 2.59* 2.84*     No results for input(s): Shellia Bugler in the last 72 hours.     Microbiology:    Blood culture #1:   Lab Results   Component Value Date    BC No growth-preliminary  11/10/2020       Blood culture #2:No results found for: BLOODCULT2    Organism:  Lab Results   Component Value Date    ORG Escherichia coli 08/11/2016         Lab Results   Component Value Date    LABGRAM  04/13/2015     Rare segmented neutrophils observed. No organisms observed. MRSA culture only:No results found for: [facility]    Urine culture:   Lab Results   Component Value Date    LABURIN [place] count: >100,000 CFU/mL 08/11/2016       Respiratory culture: No results found for: CULTRESP    Aerobic and Anaerobic :  No results found for: LABAERO  Lab Results   Component Value Date    LABANAE No growth-preliminary  No growth   04/13/2015       Urinalysis:      Lab Results   Component Value Date    NITRU NEGATIVE 11/10/2020    WBCUA 5-9 11/10/2020    WBCUA 2-4 11/29/2011    BACTERIA NONE SEEN 11/10/2020    RBCUA NONE SEEN 11/10/2020    BLOODU NEGATIVE 11/10/2020    SPECGRAV 1.030 09/06/2018    GLUCOSEU NEGATIVE 11/10/2020       Radiology:  CT Head WO Contrast   Final Result    No evidence of an acute process. No change from prior. **This report has been created using voice recognition software. It may contain minor errors which are inherent in voice recognition technology. **      Final report electronically signed by Dr. Shamika Tobar on 11/10/2020 1:22 PM      XR CHEST PORTABLE   Final Result   Nodular opacities are noted left greater than right. **This report has been created using voice recognition software. It may contain minor errors which are inherent in voice recognition technology. **      Final report electronically signed by Dr. Mike Bustamante on 11/10/2020 12:20 PM        Ct Head Wo Contrast    Result Date: 11/10/2020  PROCEDURE: CT HEAD WO CONTRAST CLINICAL INFORMATION: AMS. COVID 19 positive COMPARISON: Head CT 6/1/2016. TECHNIQUE: Noncontrast 5 mm axial images were obtained through the brain. Sagittal and coronal reconstructions were obtained.  All CT scans at this facility use dose modulation, iterative reconstruction, and/or weight-based dosing when appropriate to reduce radiation

## 2020-11-12 NOTE — PROGRESS NOTES
[address] [id number]  SPEECH THERAPY  STRZ MED SURG 8B  Bedside Swallowing Evaluation      SLP Individual Minutes  Time In: 1320  Time Out: 8343  Minutes: 8  Timed Code Treatment Minutes: 0 Minutes       Date: 2020  Patient Name: Genet Godinez      CSN: [id number]   : 1940  (68 y.o.)  Gender: female   Referring Physician: Dov Pritchard MD  Diagnosis: AMS  Secondary Diagnosis: Dysphagia; cognitive-linguistic deficits; COVID-19 contact isolation/droplet precautions     History of Present Illness/Injury: Pt admit with above dx. Please refer to H&P for full pt medical hx. Pt with hx of CVA in 2016 resulting in residual aphasia. Per RN, [place] Lighter, current functioning at baseline level. Concerns for aspiration. ST consult for clinical swallowing evaluation in order to determine need for dietary adjustments and potential need for instrumental swallowing assessment. Past Medical History:   Diagnosis Date    Abdominal pain 2014    Atrial fibrillation (HCC)     CAD (coronary artery disease)     Cerebrovascular disease     Depression     Hyperlipidemia     Hypertension     Nausea and vomiting 2015    Self-care deficit for dressing and grooming     Self-care deficit for medication administration     Type II or unspecified type diabetes mellitus without mention of complication, not stated as uncontrolled     Unspecified cerebral artery occlusion with cerebral infarction        SUBJECTIVE:  Pt seen upright in bed. Alert and cooperative. OBJECTIVE:    Pain:  No pain reported.     Current Diet: NPO until BSE     Respiratory Status:  Nasal Canula    Behavioral Observation:  Alert, Lethargic, Dysarthric and decreased oral motor planning, expressive aphasia     Oral Mechanism Evaluation:      Facial / Labial Impaired Decreased oral motor planning, decreased strength    Lingual Impaired Decreased oral motor planning, decreased coordination/ROM   Dentition Impaired Loosely fitting upper/lower dentures    Velum Not Tested Decreased oral opening    Vocal Quality Impaired Hypernasal    Sensation Not Tested    Cough Not Tested      Patient Evaluated Using:  Puree, hard solid, thin liquids by cup/straw    Oral Phase:  Impaired:  Impaired AP Movement, Impaired Mastication, Reduced Bolus Formation and vertical vs rotary chewing pattern    Pharyngeal Phase: Impaired:  Decreased Hyolaryngeal Elevation and Suspected Pharyngeal Residue    Signs and Symptoms of Laryngeal Penetration/Aspiration: Immediate Cough    Impresssions: Pt presents with mild-moderate oral and at least mild pharyngeal dysphagia evidenced by the above skilled level observations. Demonstrations of slow/impaired mastication (evidenced by primary use of vertical vs rotary chewing pattern) and slow/imipaired AP bolus transit resulting in decreased oral bolus breakdown, decreased oral bolus formation and reliance on extra time/liquid wash to achieve full adequate oral clearance. Pt with what appeared to be decreased laryngeal elevation upon manual palpation and concerns for decreased pharyngeal stripping given limited movement with concerns for pharyngeal stasis as pt with presence of multiple spontaneous swallows. Immediate cough to follow trials of mixed consistencies (I.e.cracker and water) as well as with trials of thin by straw intermittently. Improved tolerance of thin liquids by cup. Recommendations for initiation of minced/moist diet (restriction on mixed consistencies) and thin liquids (restriction on straw usage). ST to follow up with dysphagia management. RECOMMENDATIONS/ASSESSMENT:   Modified Barium Swallow:  MBS is not indicated at this time.   Will recommend as appropriate  Diet Recommendations:  Minced/moist and thin liquids   Strategies:  Full Upright Position, Small Bite/Sip, No Straw, Multiple Swallow, Pulmonary Monitoring, Oral Care after all Meals, Supervision, Medication in Applesauce, Limit Distractions, Monitor for Fatigue and restriction on mixed consistencies    Rehabilitation Potential: good    EDUCATION:  Learner: Patient  Education:  Reviewed results and recommendations of this evaluation, Reviewed diet and strategies, Reviewed ST goals and Plan of Care and Reviewed recommendations for follow-up  Evaluation of Education: Demonstrates with assistance, Needs further instruction and Family not present    PLAN:  Skilled SLP intervention on acute care 3-5 x per week or until goals met and/or pt plateaus in function. Specific interventions for next session may include: dietary analysis and dysphagia management . PATIENT GOAL:    Did not state. Will further assess during treatment. SHORT TERM GOALS:  Short-term Goals  Timeframe for Short-term Goals: 2 weeks  Goal 1: Pt will consume a minced/moist diet and thin liquids (restriction on mixed consistencies and straw) without overt s/s of aspiration to meet nutritional/hydration measures safely. Goal 2: Pt will consume advanced solids with ST only without overt s/s of aspiration in 10/10 trials for potential dietary advancement to least restrictive PO diet. Goal 3: Monitor pulmonary status and pt tolerance of PO diet with completion of FEES as clinically appropriate/indicated.     LONG TERM GOALS:  No established LTG's given short CAROL Nuñez M.S. Jana Avery 11/12/2020

## 2020-11-12 NOTE — PROGRESS NOTES
Clinical Pharmacy Note    Warfarin consult follow-up    Recent Labs     11/12/20  0609   INR 2.84*     Recent Labs     11/10/20  1902 11/11/20  0443 11/12/20  0609   HGB 11.7* 11.7* 12.1   HCT 38.2 37.5 39.1    228 262       Significant Drug-Drug Interactions:  New warfarin drug-drug interactions: none  Discontinued drug-drug interactions: none  Current warfarin drug-drug interactions: citalopram, dexamethasone, vitamin C        Date INR Warfarin Dose   11/10/20 2.46 4 mg   11/11/2020  2.59  4 mg    11/12/2020   2.84  4 mg                                      Notes:                     Daily PT/INR until stable within therapeutic range.      Chidi Tao, PharmD   11/12/2020, 11:10 AM

## 2020-11-12 NOTE — PROGRESS NOTES
[address]  PHYSICAL THERAPY MISSED TREATMENT NOTE  STR ONC MED 5K    Date: 2020  Patient Name: Sadi Webb        MRN: [id number]   : 1940  (68 y.o.)  Gender: female                REASON FOR MISSED TREATMENT:  Patient unable to participate. Pt unable to stay awake to attempt activity. Will check back next therapy date. Zaira Alarcon.  Maria Elena Coffman, [place] [address]

## 2020-11-12 NOTE — PROGRESS NOTES
Patient has been reviewed by Infection Control (Mouna Pope RN). This patient can be removed from isolation or transferred off the [facility] unit.

## 2020-11-12 NOTE — DISCHARGE INSTR - COC
Continuity of Care Form    Patient Name: Jeramy Sosa   :  1940  MRN:  [id number]    Admit date:  11/10/2020  Discharge date:  ***    Code Status Order: Prior   Advance Directives:      Admitting Physician:  Rox Mcgraw MD  PCP: Katherine Metcalf DO    Discharging Nurse: [facility] Unit/Room#: 8B-26/026-A  Discharging Unit Phone Number: ***    Emergency Contact:   Extended Emergency Contact Information  Primary Emergency Contact: Curvin Nones of [address]  Phone: [phone]  Mobile Phone: [phone]  Relation: Child  Secondary Emergency Contact: Malissa [place] of [address] [facility] Phone: [phone]  Mobile Phone: [phone]  Relation: Child    Past Surgical History:  Past Surgical History:   Procedure Laterality Date    CARDIAC CATHETERIZATION     [address]    COLONOSCOPY      COLONOSCOPY N/A 2019    COLONOSCOPY POLYPECTOMY SNARE/COLD BIOPSY performed by Cherrie Chen MD at [facility] [address]   [address] [place] [address] [address] REPLACEMENT      Lt Total Knee    JOINT REPLACEMENT      Rt Total Knee       Immunization History:   Immunization History   Administered Date(s) Administered    Influenza 10/21/2011, 01/10/2013, 10/15/2013    Influenza Virus Vaccine 10/28/2014, 10/20/2015    Influenza, High Dose (Fluzone 65 yrs and older) 10/24/2017    Influenza, Hopkins Jade, IM, (6 mo and older Fluzone, Flulaval, Fluarix and 3 yrs and older Afluria) 10/25/2016    Pneumococcal Conjugate 13-valent ([id number]) 2017    Pneumococcal Conjugate 7-valent (Ana Jerri) 10/01/2007       Active Problems:  Patient Active Problem List   Diagnosis Code    Essential hypertension I10    OA (osteoarthritis), multiple joints M19.90    Gastroesophageal reflux disease without esophagitis K21.9    S/P TKR (total knee replacement), bilateral Z96.659    Hemiplegia of right dominant side due to noncerebrovascular etiology ([facility] [address] G81.91  Aphasia as late effect of cerebrovascular accident I69.320    OAB (overactive bladder) U23.16    Folliculitis, lt face.  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    Sepsis due to urinary tract infection (HCC) A41.9, N39.0    Delirium R41.0    Diarrhea in adult patient R19.7    Depression F32.9    Pleural effusion J90    History of cardioembolic cerebrovascular accident (CVA) Z86.73    Absolute anemia D64.9    Ileus (HCC) K56.7    Iron deficiency anemia D50.9    Hemarthrosis of left knee M25.062    Diabetes mellitus type 2 in obese ([facility]) E11.69, E66.9    Leukocytosis D72.829    Ambulatory dysfunction R26.2    Bleeding on Coumadin R58, T45.515A    CVA, old, aphasia I69.5    Recurrent major depressive disorder, in partial remission ([facility]) F33.41    BCC (basal cell carcinoma), arm, left C44.619    Spastic hemiplegia of right dominant side due to nontraumatic intraparenchymal hemorrhage of brain ([facility]) I61.9, G81.11    SBO (small bowel obstruction) ([facility] [address] K56.609    DENIS (dyspnea on exertion) R06.00    GIB (gastrointestinal bleeding) K92.2    Chronic atrial fibrillation ([facility]) I48.20    Acute blood loss anemia D62    Lower leg edema R60.0    Pure hypercholesterolemia E78.00    Morbidly obese ([facility]) E66.01    Urge incontinence of urine N39.41    AMS (altered mental status) R41.82       Isolation/Infection:   Isolation            No Isolation          Patient Infection Status       Infection Onset Added Last Indicated Last Indicated By Review Planned Expiration Resolved Resolved By    None active    Resolved    COVID-19 Rule Out 11/10/20 11/10/20 11/10/20 COVID-19 (Ordered)   11/12/20 Flash Hughes RN    + collected on 11/2 at [facility]; admitted 11/10 altered mental status            Nurse Assessment:  Last Vital Signs: BP (!) 150/63   Pulse 61   Temp 98.1 °F (36.7 °C) (Oral)   Resp 20   Ht 5' 2\" (1.575 m)   Wt 224 lb 13.9 oz (102 kg)   SpO2 96%   BMI 41.13 kg/m²     Last documented pain score (0-10 scale): Pain Level: 0  Last Weight:   Wt Readings from Last 1 Encounters:   11/11/20 224 lb 13.9 oz (102 kg)     Mental Status:  alert    IV Access:  - None    Nursing Mobility/ADLs:  Walking   Dependent  Transfer  Dependent  Bathing  Dependent  Dressing  Dependent  Toileting  Dependent  Feeding  Dependent  Med Admin  Dependent  Med Delivery   whole    Wound Care Documentation and Therapy:        Elimination:  Continence:   · Bowel: No  · Bladder: No  Urinary Catheter: None   Colostomy/Ileostomy/Ileal Conduit: No       Date of Last BM: 11/18/20      Intake/Output Summary (Last 24 hours) at 11/12/2020 1257  Last data filed at 11/12/2020 0504  Gross per 24 hour   Intake 1231.34 ml   Output 550 ml   Net 681.34 ml     I/O last 3 completed shifts: In: 1231.3 [I.V.:1231.3]  Out: 550 [Urine:550]    Safety Concerns: At Risk for Falls    Impairments/Disabilities:      Speech    Nutrition Therapy:  Current Nutrition Therapy:   - Oral Diet:  General    Routes of Feeding: Oral  Liquids: Thin Liquids  Daily Fluid Restriction: no  Last Modified Barium Swallow with Video (Video Swallowing Test): not done    Treatments at the Time of Hospital Discharge:   Respiratory Treatments: ***  Oxygen Therapy:  is on oxygen at 1 L/min per nasal cannula.   Ventilator:    - No ventilator support    Rehab Therapies: None  Weight Bearing Status/Restrictions: No weight bearing restirctions  Other Medical Equipment (for information only, NOT a DME order):  hospital bed  Other Treatments: None      Patient's personal belongings (please select all that are sent with patient):  None    RN SIGNATURE:  Electronically signed by Beto Rubio RN on 11/19/20 at 8:52 AM EST    CASE MANAGEMENT/SOCIAL WORK SECTION    Inpatient Status Date: 11/10/2020    Readmission Risk Assessment Score:  Readmission Risk              Risk of Unplanned Readmission:        21           Discharging to Facility/ Agency   · Name: [facility]  · Address: [address] [place] [address]  · Phone: [phone]  · Fax: [phone]  ·     Dialysis Facility (if applicable)   · Name:  · Address:  · Dialysis Schedule:  · Phone:  · Fax:    / signature: Electronically signed by NISHA Loyd on 11/12/20 at 1:00 PM EST    PHYSICIAN SECTION    Prognosis: Guarded    Condition at Discharge:  guarded    Rehab Potential (if transferring to Rehab): Guarded    Recommended Labs or Other Treatments After Discharge: per hospice care    Physician Certification: I certify the above information and transfer of Kari Kaminski  is necessary for the continuing treatment of the diagnosis listed and that she requires [facility] with hospice for greater 30 days.      Update Admission H&P: No change in H&P    PHYSICIAN SIGNATURE:  Electronically signed by Sully Pereyra MD on 11/18/20 at 4:59 PM EST

## 2020-11-13 LAB
ALBUMIN SERPL-MCNC: 2.8 G/DL (ref 3.5–5.1)
ALP BLD-CCNC: 78 U/L (ref 38–126)
ALT SERPL-CCNC: 30 U/L (ref 11–66)
ANION GAP SERPL CALCULATED.3IONS-SCNC: 12 MEQ/L (ref 8–16)
AST SERPL-CCNC: 22 U/L (ref 5–40)
AVERAGE GLUCOSE: 144 MG/DL (ref 70–126)
BASOPHILS # BLD: 0.1 %
BASOPHILS ABSOLUTE: 0 THOU/MM3 (ref 0–0.1)
BILIRUB SERPL-MCNC: 0.3 MG/DL (ref 0.3–1.2)
BILIRUBIN DIRECT: < 0.2 MG/DL (ref 0–0.3)
BUN BLDV-MCNC: 29 MG/DL (ref 7–22)
CALCIUM IONIZED: 1 MMOL/L (ref 1.12–1.32)
CALCIUM SERPL-MCNC: 8.5 MG/DL (ref 8.5–10.5)
CHLORIDE BLD-SCNC: 100 MEQ/L (ref 98–111)
CO2: 26 MEQ/L (ref 23–33)
CREAT SERPL-MCNC: 1.3 MG/DL (ref 0.4–1.2)
EOSINOPHIL # BLD: 0.1 %
EOSINOPHILS ABSOLUTE: 0 THOU/MM3 (ref 0–0.4)
ERYTHROCYTE [DISTWIDTH] IN BLOOD BY AUTOMATED COUNT: 15.9 % (ref 11.5–14.5)
ERYTHROCYTE [DISTWIDTH] IN BLOOD BY AUTOMATED COUNT: 48.8 FL (ref 35–45)
GFR SERPL CREATININE-BSD FRML MDRD: 40 ML/MIN/1.73M2
GLUCOSE BLD-MCNC: 115 MG/DL (ref 70–108)
GLUCOSE BLD-MCNC: 133 MG/DL (ref 70–108)
GLUCOSE BLD-MCNC: 169 MG/DL (ref 70–108)
GLUCOSE BLD-MCNC: 175 MG/DL (ref 70–108)
GLUCOSE BLD-MCNC: 79 MG/DL (ref 70–108)
HBA1C MFR BLD: 6.8 % (ref 4.4–6.4)
HCT VFR BLD CALC: 39.6 % (ref 37–47)
HEMOGLOBIN: 12.1 GM/DL (ref 12–16)
IMMATURE GRANS (ABS): 0.04 THOU/MM3 (ref 0–0.07)
IMMATURE GRANULOCYTES: 0.4 %
INR BLD: 3.03 (ref 0.85–1.13)
LYMPHOCYTES # BLD: 14.7 %
LYMPHOCYTES ABSOLUTE: 1.5 THOU/MM3 (ref 1–4.8)
MCH RBC QN AUTO: 25.9 PG (ref 26–33)
MCHC RBC AUTO-ENTMCNC: 30.6 GM/DL (ref 32.2–35.5)
MCV RBC AUTO: 84.6 FL (ref 81–99)
MONOCYTES # BLD: 9 %
MONOCYTES ABSOLUTE: 0.9 THOU/MM3 (ref 0.4–1.3)
NUCLEATED RED BLOOD CELLS: 0 /100 WBC
PLATELET # BLD: 242 THOU/MM3 (ref 130–400)
PMV BLD AUTO: 10.6 FL (ref 9.4–12.4)
POTASSIUM SERPL-SCNC: 4.2 MEQ/L (ref 3.5–5.2)
RBC # BLD: 4.68 MILL/MM3 (ref 4.2–5.4)
SEG NEUTROPHILS: 75.7 %
SEGMENTED NEUTROPHILS ABSOLUTE COUNT: 7.7 THOU/MM3 (ref 1.8–7.7)
SODIUM BLD-SCNC: 138 MEQ/L (ref 135–145)
TOTAL PROTEIN: 6.3 G/DL (ref 6.1–8)
WBC # BLD: 10.2 THOU/MM3 (ref 4.8–10.8)

## 2020-11-13 PROCEDURE — 6370000000 HC RX 637 (ALT 250 FOR IP): Performed by: FAMILY MEDICINE

## 2020-11-13 PROCEDURE — 92526 ORAL FUNCTION THERAPY: CPT

## 2020-11-13 PROCEDURE — 85025 COMPLETE CBC W/AUTO DIFF WBC: CPT

## 2020-11-13 PROCEDURE — 1200000003 HC TELEMETRY R&B

## 2020-11-13 PROCEDURE — 2580000003 HC RX 258: Performed by: HOSPITALIST

## 2020-11-13 PROCEDURE — 51701 INSERT BLADDER CATHETER: CPT

## 2020-11-13 PROCEDURE — 94669 MECHANICAL CHEST WALL OSCILL: CPT

## 2020-11-13 PROCEDURE — 82248 BILIRUBIN DIRECT: CPT

## 2020-11-13 PROCEDURE — 94760 N-INVAS EAR/PLS OXIMETRY 1: CPT

## 2020-11-13 PROCEDURE — 51798 US URINE CAPACITY MEASURE: CPT

## 2020-11-13 PROCEDURE — 82330 ASSAY OF CALCIUM: CPT

## 2020-11-13 PROCEDURE — 2700000000 HC OXYGEN THERAPY PER DAY

## 2020-11-13 PROCEDURE — 82948 REAGENT STRIP/BLOOD GLUCOSE: CPT

## 2020-11-13 PROCEDURE — 80053 COMPREHEN METABOLIC PANEL: CPT

## 2020-11-13 PROCEDURE — 6370000000 HC RX 637 (ALT 250 FOR IP): Performed by: PHYSICIAN ASSISTANT

## 2020-11-13 PROCEDURE — 83036 HEMOGLOBIN GLYCOSYLATED A1C: CPT

## 2020-11-13 PROCEDURE — 36415 COLL VENOUS BLD VENIPUNCTURE: CPT

## 2020-11-13 PROCEDURE — 85610 PROTHROMBIN TIME: CPT

## 2020-11-13 PROCEDURE — 99232 SBSQ HOSP IP/OBS MODERATE 35: CPT | Performed by: PHYSICIAN ASSISTANT

## 2020-11-13 RX ORDER — WARFARIN SODIUM 3 MG/1
3 TABLET ORAL ONCE
Status: COMPLETED | OUTPATIENT
Start: 2020-11-13 | End: 2020-11-13

## 2020-11-13 RX ADMIN — CARVEDILOL 6.25 MG: 6.25 TABLET, FILM COATED ORAL at 21:13

## 2020-11-13 RX ADMIN — Medication 2000 UNITS: at 10:12

## 2020-11-13 RX ADMIN — INSULIN LISPRO 4 UNITS: 100 INJECTION, SOLUTION INTRAVENOUS; SUBCUTANEOUS at 19:24

## 2020-11-13 RX ADMIN — OXYBUTYNIN CHLORIDE 10 MG: 10 TABLET, EXTENDED RELEASE ORAL at 10:12

## 2020-11-13 RX ADMIN — DOCUSATE SODIUM 100 MG: 100 CAPSULE, LIQUID FILLED ORAL at 10:15

## 2020-11-13 RX ADMIN — ATORVASTATIN CALCIUM 20 MG: 20 TABLET, FILM COATED ORAL at 10:13

## 2020-11-13 RX ADMIN — INSULIN LISPRO 2 UNITS: 100 INJECTION, SOLUTION INTRAVENOUS; SUBCUTANEOUS at 19:23

## 2020-11-13 RX ADMIN — INSULIN LISPRO 4 UNITS: 100 INJECTION, SOLUTION INTRAVENOUS; SUBCUTANEOUS at 13:10

## 2020-11-13 RX ADMIN — SODIUM CHLORIDE: 4.5 INJECTION, SOLUTION INTRAVENOUS at 11:29

## 2020-11-13 RX ADMIN — Medication 50 MG: at 10:13

## 2020-11-13 RX ADMIN — WARFARIN SODIUM 3 MG: 3 TABLET ORAL at 19:21

## 2020-11-13 RX ADMIN — AMLODIPINE BESYLATE 10 MG: 10 TABLET ORAL at 10:12

## 2020-11-13 RX ADMIN — LATANOPROST 1 DROP: 50 SOLUTION OPHTHALMIC at 21:13

## 2020-11-13 RX ADMIN — ANTACID TABLETS 500 MG: 500 TABLET, CHEWABLE ORAL at 19:23

## 2020-11-13 RX ADMIN — CITALOPRAM 10 MG: 20 TABLET, FILM COATED ORAL at 10:13

## 2020-11-13 RX ADMIN — INSULIN LISPRO 2 UNITS: 100 INJECTION, SOLUTION INTRAVENOUS; SUBCUTANEOUS at 13:10

## 2020-11-13 RX ADMIN — OXYCODONE HYDROCHLORIDE AND ACETAMINOPHEN 500 MG: 500 TABLET ORAL at 10:12

## 2020-11-13 RX ADMIN — INSULIN GLARGINE 10 UNITS: 100 INJECTION, SOLUTION SUBCUTANEOUS at 13:23

## 2020-11-13 RX ADMIN — FERROUS SULFATE TAB 325 MG (65 MG ELEMENTAL FE) 325 MG: 325 (65 FE) TAB at 10:12

## 2020-11-13 RX ADMIN — CARVEDILOL 6.25 MG: 6.25 TABLET, FILM COATED ORAL at 10:12

## 2020-11-13 RX ADMIN — PANTOPRAZOLE SODIUM 40 MG: 40 TABLET, DELAYED RELEASE ORAL at 10:16

## 2020-11-13 ASSESSMENT — PAIN SCALES - WONG BAKER
WONGBAKER_NUMERICALRESPONSE: 0
WONGBAKER_NUMERICALRESPONSE: 0

## 2020-11-13 ASSESSMENT — PAIN SCALES - GENERAL
PAINLEVEL_OUTOF10: 0

## 2020-11-13 NOTE — PROGRESS NOTES
[address] [address]  INPATIENT SPEECH THERAPY  STRZ ONC MED 5K  DAILY NOTE    TIME   SLP Individual Minutes  Time In: 7297  Time Out: 1024  Minutes: 8  Timed Code Treatment Minutes: 0 Minutes       Date: 2020  Patient Name: Viri Aparicio      CSN: [id number]   : 1940  (68 y.o.)  Gender: female   Referring Physician: Luis Manuel Hernandez MD  Diagnosis: AMS  Secondary Diagnosis: Dysphagia; cognitive-linguistic deficits   Precautions: Fall risk, aspiration precautions   Current Diet: Minced/moist and thin liquids   Swallowing Strategies: Avoidance of mixed consistencies, no straws, medications in applesauce, supervision with meals, CLOSE pulmonary monitoring, no straws  Date of Last MBS: Not Applicable    Pain:  No pain reported. Subjective:  Pt seen upright in bed with breakfast tray present and pt in less than optimal positioning (I.e. leaning towards her right side and bed not fully upright to 90 degrees). Adjustments made to enhance positioning for feeding. Pt with wet vocal quality present upon ST arrival. Cleared with no further wetness indicated. Short-Term Goals:  SHORT TERM GOAL #1:  Goal 1: Pt will consume a minced/moist diet and thin liquids (restriction on mixed consistencies and straw) without overt s/s of aspiration to meet nutritional/hydration measures safely. INTERVENTIONS: Skilled dysphagia therapy via completion of PO trials. Pt with trials of cracker moistened in pudding x6 and sips of thin liquids by cup x9. *Pt demonstrations of slow/impaired mastication and AP transit resulting in need for extra time to achieve effective bolus breakdown and formation. Appears to be tolerating modified textures with good success. Appropriate oral clearance with extra time and liquid wash. Certainly cannot r/o premature pharyngeal entry without formal imaging. Intermittent slight audible swallow present. Spoke with RN, Dulce Metcalf who reports \"good\" lung sounds.  Pt with no overt s/s of aspiration. Recommendations for pt to resume minced/moist diet and thin liquids. Will continue to monitor pt need for potential instrumental assessment. SHORT TERM GOAL #2:  Goal 2: Pt will consume advanced solids with ST only without overt s/s of aspiration in 10/10 trials for potential dietary advancement to least restrictive PO diet. INTERVENTIONS: Pt inappropriate for advanced solids. SHORT TERM GOAL #3:  Goal 3: Monitor pulmonary status and pt tolerance of PO diet with completion of FEES as clinically appropriate/indicated. INTERVENTIONS: See STG 1.     Long-Term Goals:   No established LTG's given short ELOS    EDUCATION:  Learner: Patient  Education:  Reviewed results and recommendations of this evaluation, Reviewed diet and strategies, Reviewed ST goals and Plan of Care and Reviewed recommendations for follow-up  Evaluation of Education: Demonstrates with assistance, Needs further instruction and Family not present    ASSESSMENT/PLAN:  Activity Tolerance:  Patient tolerance of  treatment: good. Assessment/Plan: Patient progressing toward established goals. Continues to require skilled care of licensed speech pathologist to progress toward achievement of established goals and plan of care. .     Plan for Next Session: skilled dietary analysis, advanced solids as appropriate, monitor need for MBS     Dajuan Mendez M.S. [address] [id number] 11/13/2020

## 2020-11-13 NOTE — PROGRESS NOTES
Pt has an external catheter. No urine was in the canister in the tube. A bladder scan was done that showed 497 MLs of urine. Prior to straight cathing the pt, she urinated on her chux. Straight cath produced 300 MLs. Her chux was also saturated.

## 2020-11-13 NOTE — PROGRESS NOTES
Hospitalist Progress Note      Patient:  Manish Bihsop    Unit/Bed:5K-16/016-A  YOB: 1940  MRN: [id number]   Acct: [de-identified]   PCP: Macy Roldan DO  Date of Admission: 11/10/2020    Assessment/Plan:    1. COVID-19 infection: resulted + on 11/2. ok to continue Vitamin C, Vitamin D, Zinc. Pt was taken off Remdesivir (no evidence on MAR that this was received) and Dexa, s/p 3 doses per hospitalist, if respiratory status does not improve, may consider resuming this? .  2. Acute hypoxic respiratory failure: no documented hypoxia on Epic, requiring 1 L NC however with O2 saturation mid-high 90's. Wean as tolerated. Repeat CXR in am.   3. Dysphagia: seen by SLP, recommended minced/ moist and thin liquids  4. ELMER, improving: Creatinine 1.5, on arrival.  Creatinine baseline ~0.9.  Likely prerenal. Improved with IVF, has trended down to normal. Continue to monitor with daily BMP. Lisinopril had been held, okay to resume. 5. Hypocalcemia: ical 1.00, replete per protocol and repeat in am.   6. Acute metabolic encephalopathy, improved: new baseline? , per review pt is A&Ox3 some days and only x2 at other times. Likely related to infectious process. Continue to monitor. 7. 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 Stable at baseline  8. PAF on [address] [place] [address] Road: NSR/CVR on BB. On warfarin, Pharmacy to dose   9. IDDM: most recent Hgb A1c 10.2 in 2/19, repeat. Blood glucose appears labile, continue with Lantus and SSI. ACCU and hypoglycemia protocol. 10. Essential HTN: Home meds resumed, able to give ACE-I with resolution of ELMER. 11. Obesity: BMI 41.13    Chief Complaint: AMS     Initial H and P:-    \"**Due to patient's mentation and history of CVA with residual aphasia. History obtained by chart review and nursing. **      This is a chronically ill 62-year-old female who presented to [facility] emergency department on 11/10 due to altered mental status from the nursing home. Nursing home called EMS around 10 AM.  Patient told ED provider that she was not sure why she was at the emergency department but stated \"she is feeling worse than usual.\"  Patient denies any pain at this time. Patient denies any alleviating or aggravating factors. Patient was from Covid unit at her nursing home.     Hospitalist will admit the patient for further work-up. \"    Subjective (past 24 hours):   11/13-> pt seems mildly confused today, is unable to state why she is here or what year it is. Reports \"I don't know\" to many questions. She states she is normally confused. Denies any chest pain, denies current SOB. Reports a non-productive cough. No dysuria, or suprapubic pain. No n/v/d/c. Past medical history, family history, social history and allergies reviewed again and is unchanged since admission. ROS (12 point review of systems completed. Pertinent positives noted.  Otherwise ROS is negative)     Medications:  Reviewed    Infusion Medications    sodium chloride 75 mL/hr at 11/12/20 1051    dextrose       Scheduled Medications    calcium carbonate  500 mg Oral Once per day on Mon Wed Fri    carvedilol  6.25 mg Oral BID    citalopram  10 mg Oral Daily    docusate sodium  100 mg Oral Daily    atorvastatin  20 mg Oral Daily    oxybutynin  1 tablet Oral Daily    pantoprazole  40 mg Oral Daily    insulin lispro  0-12 Units Subcutaneous TID WC    insulin lispro  0-6 Units Subcutaneous Nightly    ferrous sulfate  325 mg Oral Daily    Vitamin D  2,000 Units Oral Daily    vitamin C  500 mg Oral Daily    zinc sulfate  50 mg Oral Daily    warfarin (COUMADIN) daily dosing (placeholder)   Other RX Placeholder    amLODIPine  10 mg Oral Daily    [Held by provider] lisinopril  40 mg Oral Daily    insulin glargine  30 Units Subcutaneous Nightly    insulin glargine  10 Units Subcutaneous Daily    insulin lispro  4 Units Subcutaneous 11/10/20  1318 11/11/20  0443 11/12/20  0609   INR 2.46* 2.59* 2.84*     No results for input(s): CKTOTAL, TROPONINI in the last 72 hours. Microbiology:    Blood culture #1:   Lab Results   Component Value Date    BC No growth-preliminary  11/10/2020       Blood culture #2:No results found for: No Tobar    Organism:  Lab Results   Component Value Date    ORG Escherichia coli 08/11/2016         Lab Results   Component Value Date    LABGRAM  04/13/2015     Rare segmented neutrophils observed. No organisms observed. MRSA culture only:No results found for: [facility]    Urine culture:   Lab Results   Component Value Date    LABURIN [place] count: >100,000 CFU/mL 08/11/2016       Respiratory culture: No results found for: CULTRESP    Aerobic and Anaerobic :  No results found for: LABAERO  Lab Results   Component Value Date    LABANAE No growth-preliminary  No growth   04/13/2015       Urinalysis:      Lab Results   Component Value Date    NITRU NEGATIVE 11/10/2020    WBCUA 5-9 11/10/2020    WBCUA 2-4 11/29/2011    BACTERIA NONE SEEN 11/10/2020    RBCUA NONE SEEN 11/10/2020    BLOODU NEGATIVE 11/10/2020    SPECGRAV 1.030 09/06/2018    GLUCOSEU NEGATIVE 11/10/2020       Radiology:  CT Head WO Contrast   Final Result    No evidence of an acute process. No change from prior. **This report has been created using voice recognition software. It may contain minor errors which are inherent in voice recognition technology. **      Final report electronically signed by Dr. Lencho Rubi on 11/10/2020 1:22 PM      XR CHEST PORTABLE   Final Result   Nodular opacities are noted left greater than right. **This report has been created using voice recognition software. It may contain minor errors which are inherent in voice recognition technology. **      Final report electronically signed by Dr. Genaro Canavan on 11/10/2020 12:20 PM        Ct Head Wo Contrast    Result Date: 11/10/2020  PROCEDURE: CT HEAD WO CONTRAST CLINICAL INFORMATION: AMS. COVID 19 positive COMPARISON: Head CT 6/1/2016. TECHNIQUE: Noncontrast 5 mm axial images were obtained through the brain. Sagittal and coronal reconstructions were obtained. All CT scans at this facility use dose modulation, iterative reconstruction, and/or weight-based dosing when appropriate to reduce radiation dose to as low as reasonably achievable. FINDINGS: There is no hemorrhage. There is stable moderate-sized old infarct in the left frontoparietal junction at the level the vertex. There is associated ex vacuo dilation of the body of the left lateral ventricle. There are no new areas of abnormal attenuation. There is no hydrocephalus, midline shift or mass effect. There are vascular calcifications. The paranasal sinuses and mastoid air cells are normally aerated. There is no suspicious calvarial abnormality. No evidence of an acute process. No change from prior. **This report has been created using voice recognition software. It may contain minor errors which are inherent in voice recognition technology. ** Final report electronically signed by Dr. Sarah Henderson on 11/10/2020 1:22 PM    Xr Chest Portable    Result Date: 11/10/2020  PROCEDURE: XR CHEST PORTABLE CLINICAL INFORMATION: AMS. COMPARISON: Multiple previous most recent 5/3/2019 TECHNIQUE: AP upright view of the chest. FINDINGS: Mild cardiomegaly is similar to previous. There are small opacities inferiorly bilaterally. One of these is somewhat nodular on the left. Nodular infiltrate could cause this as could a lung nodule. Nodular opacities are noted left greater than right. **This report has been created using voice recognition software. It may contain minor errors which are inherent in voice recognition technology. ** Final report electronically signed by Dr. Israel Cerna on 11/10/2020 12:20 PM      Electronically signed by Raza Carrion PA-C on 11/13/2020 at 10:57 AM

## 2020-11-13 NOTE — PROGRESS NOTES
Clinical Pharmacy Note    Warfarin consult follow-up    Recent Labs     11/13/20  1035   INR 3.03*     Recent Labs     11/11/20  0443 11/12/20  0609 11/13/20  1035   HGB 11.7* 12.1 12.1   HCT 37.5 39.1 39.6    262 242       Significant Drug-Drug Interactions:  New warfarin drug-drug interactions: none  Discontinued drug-drug interactions: dexamethasone (11/10-11/12)  Current warfarin drug-drug interactions: citalopram, vitamin C        Date INR Warfarin Dose   11/10/20 2.46 4 mg   11/11/2020  2.59  4 mg    11/12/2020   2.84  4 mg    11/13/2020 3.03  3 mg                                  Notes:                     Daily PT/INR until stable within therapeutic range.    Kelsie Ervin, PharmD 11/13/2020 11:20 AM

## 2020-11-13 NOTE — PROCEDURES
A Bladder scan was performed at 0542 . The patient's last void was at unable to void . The residual amount was measured to be 492 ML. Report of results was given to [employer].

## 2020-11-14 ENCOUNTER — APPOINTMENT (OUTPATIENT)
Dept: GENERAL RADIOLOGY | Age: 80
DRG: 177 | End: 2020-11-14
Payer: MEDICARE

## 2020-11-14 LAB
ALBUMIN SERPL-MCNC: 2.7 G/DL (ref 3.5–5.1)
ALP BLD-CCNC: 65 U/L (ref 38–126)
ALT SERPL-CCNC: 24 U/L (ref 11–66)
AMMONIA: 19 UMOL/L (ref 11–60)
ANION GAP SERPL CALCULATED.3IONS-SCNC: 8 MEQ/L (ref 8–16)
AST SERPL-CCNC: 15 U/L (ref 5–40)
BASOPHILS # BLD: 0.3 %
BASOPHILS ABSOLUTE: 0 THOU/MM3 (ref 0–0.1)
BILIRUB SERPL-MCNC: 0.3 MG/DL (ref 0.3–1.2)
BILIRUBIN DIRECT: < 0.2 MG/DL (ref 0–0.3)
BUN BLDV-MCNC: 24 MG/DL (ref 7–22)
CALCIUM IONIZED: 1.09 MMOL/L (ref 1.12–1.32)
CALCIUM SERPL-MCNC: 8 MG/DL (ref 8.5–10.5)
CHLORIDE BLD-SCNC: 103 MEQ/L (ref 98–111)
CO2: 28 MEQ/L (ref 23–33)
CREAT SERPL-MCNC: 1.2 MG/DL (ref 0.4–1.2)
EOSINOPHIL # BLD: 0.3 %
EOSINOPHILS ABSOLUTE: 0 THOU/MM3 (ref 0–0.4)
ERYTHROCYTE [DISTWIDTH] IN BLOOD BY AUTOMATED COUNT: 15.9 % (ref 11.5–14.5)
ERYTHROCYTE [DISTWIDTH] IN BLOOD BY AUTOMATED COUNT: 48.6 FL (ref 35–45)
GFR SERPL CREATININE-BSD FRML MDRD: 43 ML/MIN/1.73M2
GLUCOSE BLD-MCNC: 134 MG/DL (ref 70–108)
GLUCOSE BLD-MCNC: 173 MG/DL (ref 70–108)
GLUCOSE BLD-MCNC: 302 MG/DL (ref 70–108)
GLUCOSE BLD-MCNC: 90 MG/DL (ref 70–108)
GLUCOSE BLD-MCNC: 93 MG/DL (ref 70–108)
HCT VFR BLD CALC: 35.9 % (ref 37–47)
HEMOGLOBIN: 11.1 GM/DL (ref 12–16)
IMMATURE GRANS (ABS): 0.04 THOU/MM3 (ref 0–0.07)
IMMATURE GRANULOCYTES: 0.5 %
INR BLD: 3.38 (ref 0.85–1.13)
LV EF: 58 %
LVEF MODALITY: NORMAL
LYMPHOCYTES # BLD: 25.4 %
LYMPHOCYTES ABSOLUTE: 2 THOU/MM3 (ref 1–4.8)
MCH RBC QN AUTO: 25.9 PG (ref 26–33)
MCHC RBC AUTO-ENTMCNC: 30.9 GM/DL (ref 32.2–35.5)
MCV RBC AUTO: 83.9 FL (ref 81–99)
MONOCYTES # BLD: 8.4 %
MONOCYTES ABSOLUTE: 0.7 THOU/MM3 (ref 0.4–1.3)
NUCLEATED RED BLOOD CELLS: 0 /100 WBC
PLATELET # BLD: 218 THOU/MM3 (ref 130–400)
PMV BLD AUTO: 10.6 FL (ref 9.4–12.4)
POTASSIUM SERPL-SCNC: 3.5 MEQ/L (ref 3.5–5.2)
PTH INTACT: 62.3 PG/ML (ref 15–65)
RBC # BLD: 4.28 MILL/MM3 (ref 4.2–5.4)
SARS-COV-2, NAAT: DETECTED
SEG NEUTROPHILS: 65.1 %
SEGMENTED NEUTROPHILS ABSOLUTE COUNT: 5.1 THOU/MM3 (ref 1.8–7.7)
SODIUM BLD-SCNC: 139 MEQ/L (ref 135–145)
TOTAL PROTEIN: 5.6 G/DL (ref 6.1–8)
VITAMIN D 25-HYDROXY: 15 NG/ML (ref 30–100)
WBC # BLD: 7.9 THOU/MM3 (ref 4.8–10.8)

## 2020-11-14 PROCEDURE — 6370000000 HC RX 637 (ALT 250 FOR IP): Performed by: PHYSICIAN ASSISTANT

## 2020-11-14 PROCEDURE — 6370000000 HC RX 637 (ALT 250 FOR IP): Performed by: FAMILY MEDICINE

## 2020-11-14 PROCEDURE — 85025 COMPLETE CBC W/AUTO DIFF WBC: CPT

## 2020-11-14 PROCEDURE — 2580000003 HC RX 258: Performed by: HOSPITALIST

## 2020-11-14 PROCEDURE — 97162 PT EVAL MOD COMPLEX 30 MIN: CPT

## 2020-11-14 PROCEDURE — 93306 TTE W/DOPPLER COMPLETE: CPT

## 2020-11-14 PROCEDURE — 71045 X-RAY EXAM CHEST 1 VIEW: CPT

## 2020-11-14 PROCEDURE — 94669 MECHANICAL CHEST WALL OSCILL: CPT

## 2020-11-14 PROCEDURE — 85610 PROTHROMBIN TIME: CPT

## 2020-11-14 PROCEDURE — 6370000000 HC RX 637 (ALT 250 FOR IP)

## 2020-11-14 PROCEDURE — 82306 VITAMIN D 25 HYDROXY: CPT

## 2020-11-14 PROCEDURE — 6360000002 HC RX W HCPCS: Performed by: FAMILY MEDICINE

## 2020-11-14 PROCEDURE — 82948 REAGENT STRIP/BLOOD GLUCOSE: CPT

## 2020-11-14 PROCEDURE — XW033E5 INTRODUCTION OF REMDESIVIR ANTI-INFECTIVE INTO PERIPHERAL VEIN, PERCUTANEOUS APPROACH, NEW TECHNOLOGY GROUP 5: ICD-10-PCS | Performed by: FAMILY MEDICINE

## 2020-11-14 PROCEDURE — 36415 COLL VENOUS BLD VENIPUNCTURE: CPT

## 2020-11-14 PROCEDURE — 2500000003 HC RX 250 WO HCPCS: Performed by: FAMILY MEDICINE

## 2020-11-14 PROCEDURE — 2700000000 HC OXYGEN THERAPY PER DAY

## 2020-11-14 PROCEDURE — U0002 COVID-19 LAB TEST NON-CDC: HCPCS

## 2020-11-14 PROCEDURE — 1200000003 HC TELEMETRY R&B

## 2020-11-14 PROCEDURE — 94760 N-INVAS EAR/PLS OXIMETRY 1: CPT

## 2020-11-14 PROCEDURE — 97530 THERAPEUTIC ACTIVITIES: CPT

## 2020-11-14 PROCEDURE — 82140 ASSAY OF AMMONIA: CPT

## 2020-11-14 PROCEDURE — 82248 BILIRUBIN DIRECT: CPT

## 2020-11-14 PROCEDURE — 99232 SBSQ HOSP IP/OBS MODERATE 35: CPT | Performed by: FAMILY MEDICINE

## 2020-11-14 PROCEDURE — 80053 COMPREHEN METABOLIC PANEL: CPT

## 2020-11-14 PROCEDURE — 2580000003 HC RX 258: Performed by: FAMILY MEDICINE

## 2020-11-14 PROCEDURE — 82330 ASSAY OF CALCIUM: CPT

## 2020-11-14 PROCEDURE — 83970 ASSAY OF PARATHORMONE: CPT

## 2020-11-14 RX ORDER — POTASSIUM CHLORIDE 20 MEQ/1
40 TABLET, EXTENDED RELEASE ORAL ONCE
Status: COMPLETED | OUTPATIENT
Start: 2020-11-14 | End: 2020-11-14

## 2020-11-14 RX ORDER — DEXAMETHASONE SODIUM PHOSPHATE 4 MG/ML
6 INJECTION, SOLUTION INTRA-ARTICULAR; INTRALESIONAL; INTRAMUSCULAR; INTRAVENOUS; SOFT TISSUE EVERY 24 HOURS
Status: DISCONTINUED | OUTPATIENT
Start: 2020-11-14 | End: 2020-11-17

## 2020-11-14 RX ORDER — SODIUM CHLORIDE 0.9 % (FLUSH) 0.9 %
30 SYRINGE (ML) INJECTION PRN
Status: DISCONTINUED | OUTPATIENT
Start: 2020-11-14 | End: 2020-11-19 | Stop reason: HOSPADM

## 2020-11-14 RX ORDER — INSULIN GLARGINE 100 [IU]/ML
8 INJECTION, SOLUTION SUBCUTANEOUS DAILY
Status: DISCONTINUED | OUTPATIENT
Start: 2020-11-14 | End: 2020-11-19 | Stop reason: HOSPADM

## 2020-11-14 RX ORDER — FUROSEMIDE 10 MG/ML
40 INJECTION INTRAMUSCULAR; INTRAVENOUS ONCE
Status: COMPLETED | OUTPATIENT
Start: 2020-11-14 | End: 2020-11-14

## 2020-11-14 RX ADMIN — DOCUSATE SODIUM 100 MG: 100 CAPSULE, LIQUID FILLED ORAL at 09:20

## 2020-11-14 RX ADMIN — PANTOPRAZOLE SODIUM 40 MG: 40 TABLET, DELAYED RELEASE ORAL at 09:21

## 2020-11-14 RX ADMIN — FERROUS SULFATE TAB 325 MG (65 MG ELEMENTAL FE) 325 MG: 325 (65 FE) TAB at 09:23

## 2020-11-14 RX ADMIN — OXYBUTYNIN CHLORIDE 10 MG: 10 TABLET, EXTENDED RELEASE ORAL at 09:22

## 2020-11-14 RX ADMIN — CARVEDILOL 6.25 MG: 6.25 TABLET, FILM COATED ORAL at 09:22

## 2020-11-14 RX ADMIN — SODIUM CHLORIDE: 4.5 INJECTION, SOLUTION INTRAVENOUS at 01:28

## 2020-11-14 RX ADMIN — LATANOPROST 1 DROP: 50 SOLUTION OPHTHALMIC at 21:12

## 2020-11-14 RX ADMIN — AMLODIPINE BESYLATE 10 MG: 10 TABLET ORAL at 09:22

## 2020-11-14 RX ADMIN — DEXAMETHASONE SODIUM PHOSPHATE 6 MG: 4 INJECTION, SOLUTION INTRA-ARTICULAR; INTRALESIONAL; INTRAMUSCULAR; INTRAVENOUS; SOFT TISSUE at 17:12

## 2020-11-14 RX ADMIN — Medication 0.5 MG: at 17:15

## 2020-11-14 RX ADMIN — REMDESIVIR 200 MG: 100 INJECTION, POWDER, LYOPHILIZED, FOR SOLUTION INTRAVENOUS at 17:14

## 2020-11-14 RX ADMIN — FUROSEMIDE 40 MG: 10 INJECTION, SOLUTION INTRAMUSCULAR; INTRAVENOUS at 17:14

## 2020-11-14 RX ADMIN — Medication 2000 UNITS: at 09:22

## 2020-11-14 RX ADMIN — INSULIN GLARGINE 30 UNITS: 100 INJECTION, SOLUTION SUBCUTANEOUS at 22:02

## 2020-11-14 RX ADMIN — INSULIN GLARGINE 8 UNITS: 100 INJECTION, SOLUTION SUBCUTANEOUS at 09:58

## 2020-11-14 RX ADMIN — POTASSIUM CHLORIDE 40 MEQ: 1500 TABLET, EXTENDED RELEASE ORAL at 17:10

## 2020-11-14 RX ADMIN — Medication 50 MG: at 09:23

## 2020-11-14 RX ADMIN — LISINOPRIL 40 MG: 40 TABLET ORAL at 09:23

## 2020-11-14 RX ADMIN — OXYCODONE HYDROCHLORIDE AND ACETAMINOPHEN 500 MG: 500 TABLET ORAL at 09:23

## 2020-11-14 RX ADMIN — CITALOPRAM 10 MG: 20 TABLET, FILM COATED ORAL at 09:21

## 2020-11-14 RX ADMIN — ATORVASTATIN CALCIUM 20 MG: 20 TABLET, FILM COATED ORAL at 09:23

## 2020-11-14 ASSESSMENT — PAIN SCALES - WONG BAKER: WONGBAKER_NUMERICALRESPONSE: 0

## 2020-11-14 ASSESSMENT — PAIN SCALES - GENERAL: PAINLEVEL_OUTOF10: 0

## 2020-11-14 NOTE — PROGRESS NOTES
Remdesivir Initiation Note    Cachorro Mendiola meets criteria for initiation of remdesivir under the emergency use authorization (EUA) based on the following:  Known or suspected COVID-19  Severe disease (SpO2 ? 94% on RA, requiring supplemental O2, or requiring invasive mechanical ventilation)  Acceptable renal function  CrCl ? 30 ml/min based on SCr obtained prior to initiation OR   CrCl < 30 ml/min but the potential benefit of remdesivir outweighs the risk  Acceptable hepatic function (ALT within 5 times ULN)    I have discussed with the patient/proxy information consistent with the Fact Sheet for Patients and Parents/Caregivers\" and the patient/proxy has agreed to initiating remdesivir after being:  Given the Fact Sheet for Patients and Parents/Caregivers  Informed of the alternatives to receiving remdesivir, and   Informed that remdesivir is an unapproved drug that is authorized for use under EUA    Liver function tests will be monitored daily while on remdesivir.     Kate Rain, PharmD, BCPS  11/14/2020  3:26 PM

## 2020-11-14 NOTE — PROGRESS NOTES
Clinical Pharmacy Note    Warfarin consult follow-up    Recent Labs     11/14/20  0640   INR 3.38*     Recent Labs     11/12/20  0609 11/13/20  1035 11/14/20  0640   HGB 12.1 12.1 11.1*   HCT 39.1 39.6 35.9*    242 218       Significant Drug-Drug Interactions:  New warfarin drug-drug interactions: none  Discontinued drug-drug interactions: none  Current warfarin drug-drug interactions: citalopram, vitamin C      Date INR Warfarin Dose   11/10/2020 2.46 4 mg   11/11/2020 2.59 4 mg   11/12/2020 2.84 4 mg   11/13/2020 3.03 3 mg   11/14/2020 3.38 0.5 mg                       Notes:                     Daily PT/INR until stable within therapeutic range.

## 2020-11-14 NOTE — FLOWSHEET NOTE
Pt is a 80y. o. female, propped up in bed eating dinner, in 5K-016. She is approachable, quiet and peaceful. Unfortunately our visit was brief as she was having great difficulty communicating and aware due to her hospital problem.  asked questions regarding her dinner and care, nurtured hope and prayed with her. 11/13/20 1825   Encounter Summary   Services provided to: Patient   Referral/Consult From: Rounding   Continue Visiting Yes  (11/13)   Complexity of Encounter Low   Length of Encounter 15 minutes   Routine   Type Initial   Assessment Calm; Approachable;Coping   Intervention Prayer; Active listening   Outcome Expressed gratitude        11/13/20 1828   Encounter Summary   Services provided to: Patient   Referral/Consult From: Rounding   Continue Visiting Yes  (11/13)   Complexity of Encounter Low   Length of Encounter 15 minutes   Routine   Type Initial   Assessment Calm; Approachable;Coping   Intervention Prayer; Active listening   Outcome Expressed gratitude

## 2020-11-14 NOTE — PROGRESS NOTES
Hospitalist Progress Note      Patient:  Bety Lovett    Unit/Bed:5K-16/016-A  YOB: 1940  MRN: [id number]   Acct: [de-identified]   PCP: No primary care provider on file. Date of Admission: 11/10/2020    Assessment/Plan:    1. COVID-19 pneumonia: resulted + on 11/2. ok to continue Vitamin C, Vitamin D, Zinc. Pt was taken off Remdesivir (no evidence on MAR that this was received) and Dexa, s/p 3 doses per hospitalist. Now still requiring 1 lpm O2, will start remdesivir, pharmacy consult to dose. Decadron 6 mg IV daily for 7 more doses. Pt was transferred out from covid-unit as it past 10 days from the time of test  2. Acute hypoxic respiratory failure due to COVID-19 pneumonia versus CHF: 88% on room air, requiring 1 L NC, maintain oxygen saturation 93% and above. Wean as tolerated. Chest x-ray today showed increased CHF, subsegmental atelectasis in left lower lobe. Check BNP  3. Acute metabolic encephalopathy: per previous hospitalist note: \"pt is A&Ox3 some days and only x2 at other times\". possibly related to infectious process. Today, patient is only oriented to name. CT head on 11/10/2020 no acute process. Ammonia level normal.  UA on 11/10/2020 showed negative nitrite, small leukocyte esterase, WBC 5-9, negative blood. Neurology consult for further recommendation. 4. Acute on chronic HFpEF: Last echo on file was in 2016, EF of 60%. Chest x-ray showed CHF. Repeat echocardiogram.  Give 1 dose of Lasix 40 mg IV and reassess tomorrow. 5. Dysphagia: seen by SLP, recommended minced/ moist and thin liquids  6. ELMER on CKD stage II, improving: Baseline creatinine 0.8-1.1, fattening down to 1.3 today creatinine 1.5, on arrival.  Likely prerenal. Improved with IVF, stop IVF due to CHF.   Lisinopril had been held, resumed on 11/11.   7. Hypocalcemia likely due to hypoalbuminemia: ical 1.00, replete per protocol and repeat in am. Check vit D and PTH 8. 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 Stable at baseline  9. PAF on [place] [facility] AUTHORITY: NSR/CVR on BB. On warfarin, Pharmacy to dose   10. IDDM: most recent Hgb A1c 10.2 in 2/19, repeat A1c down to 6.8%. Blood glucose appears labile, now having hypoglycemia, reduce Lantus from 10units to 8 units subcu daily,reduce SSI from medium to low dose.hold lispro TID w meals for now ( per RN, pt is not eating that much). cont ACCU and hypoglycemia protocol. 11. Essential HTN: Home meds resumed, able to give ACE-I with resolution of ELMER. 12. Obesity: Body mass index is 43.59 kg/m². Chief Complaint: [address] course:    Per H/P note:    \"Due to patient's mentation and history of CVA with residual aphasia. History obtained by chart review and nursing.     This is a chronically ill 68-year-old female who presented to [facility] emergency department on 11/10 due to altered mental status from the nursing home. Nursing home called EMS around 10 AM.  Patient told ED provider that she was not sure why she was at the emergency department but stated \"she is feeling worse than usual.\"  Patient denies any pain at this time. Patient denies any alleviating or aggravating factors. Patient was from Covid unit at her nursing home.     Hospitalist will admit the patient for further work-up. \"    Subjective (past 24 hours):       Patient seen and examined.  Pt denies sob, cough, fever, chills, chest pain        Medications:  Reviewed    Infusion Medications    dextrose       Scheduled Medications    warfarin  0.5 mg Oral Once    insulin lispro  0-6 Units Subcutaneous TID     insulin lispro  0-3 Units Subcutaneous Nightly    insulin glargine  8 Units Subcutaneous Daily    calcium replacement protocol   Other RX Placeholder    calcium carbonate  500 mg Oral Once per day on Mon Wed Fri    carvedilol  6.25 mg Oral BID    citalopram  10 mg Oral Daily    docusate sodium  100 mg Oral Daily    atorvastatin  20 mg Oral Daily    oxybutynin  1 tablet Oral Daily    pantoprazole  40 mg Oral Daily    ferrous sulfate  325 mg Oral Daily    Vitamin D  2,000 Units Oral Daily    vitamin C  500 mg Oral Daily    zinc sulfate  50 mg Oral Daily    warfarin (COUMADIN) daily dosing (placeholder)   Other RX Placeholder    amLODIPine  10 mg Oral Daily    lisinopril  40 mg Oral Daily    insulin glargine  30 Units Subcutaneous Nightly    [Held by provider] insulin lispro  4 Units Subcutaneous TID WC    latanoprost  1 drop Both Eyes Nightly     PRN Meds: glucose, dextrose, glucagon (rDNA), dextrose, acetaminophen **OR** acetaminophen      Intake/Output Summary (Last 24 hours) at 11/14/2020 1505  Last data filed at 11/14/2020 1422  Gross per 24 hour   Intake 1526.24 ml   Output 250 ml   Net 1276.24 ml       Diet:  DIET DYSPHAGIA MINCED AND MOIST; No Drinking Straw    Exam:  BP (!) 165/71   Pulse 57   Temp 98.2 °F (36.8 °C) (Axillary)   Resp 18   Ht 5' 2\" (1.575 m)   Wt 238 lb 5.1 oz (108.1 kg)   SpO2 96%   BMI 43.59 kg/m²   General appearance: No apparent distress, appears stated age and cooperative. HEENT: Pupils equal, round, and reactive to light. Conjunctivae clear. Neck: Supple, with full range of motion. No jugular venous distention. Trachea midline. Respiratory: On 1 lpm O2 via NC. Diminished air entry   Cardiovascular: normal rate, regular rhythm with normal S1/S2 without murmurs, rubs or gallops. Abdomen: Soft, non-tender, non-distended with normal bowel sounds. Musculoskeletal: passive and active ROM x 4 extremities. Skin: Skin color, texture, turgor normal.  No rashes or lesions. Neurologic:  alert, oriented to self only, normal speech, no focal findings or movement disorder noted. R sided hemiplegia ( chronic)   Exam of extremities: peripheral pulses normal, no pedal edema, no clubbing or cyanosis    Labs:   Recent Labs     11/12/20  0609 11/13/20  1035 11/14/20  0640   WBC 10.7 10.2 7.9   HGB 12.1 12.1 11.1*   HCT 39.1 39.6 35.9*    242 218     Recent Labs     11/12/20  2253 11/13/20  1035 11/14/20  0640    138 139   K 4.0 4.2 3.5    100 103   CO2 25 26 28   BUN 32* 29* 24*   CREATININE 1.2 1.3* 1.2   CALCIUM 8.1* 8.5 8.0*     Recent Labs     11/12/20  0609 11/13/20  1035 11/14/20  0640   AST 33  32 22 15   ALT 33  33 30 24   BILIDIR <0.2 <0.2 <0.2   BILITOT 0.2*  0.2* 0.3 0.3   ALKPHOS 80  79 78 65     Recent Labs     11/12/20  0609 11/13/20  1035 11/14/20  0640   INR 2.84* 3.03* 3.38*     No results for input(s): Dana Brody in the last 72 hours. Microbiology:    Blood culture #1:   Lab Results   Component Value Date    BC No growth-preliminary  11/10/2020       Blood culture #2:No results found for: Ike Saldana    Organism:  Lab Results   Component Value Date    ORG Escherichia coli 08/11/2016         Lab Results   Component Value Date    LABGRAM  04/13/2015     Rare segmented neutrophils observed. No organisms observed. MRSA culture only:No results found for: [facility]    Urine culture:   Lab Results   Component Value Date    LABURIN [place] count: >100,000 CFU/mL 08/11/2016       Respiratory culture: No results found for: CULTRESP    Aerobic and Anaerobic :  No results found for: LABAERO  Lab Results   Component Value Date    LABANAE No growth-preliminary  No growth   04/13/2015       Urinalysis:      Lab Results   Component Value Date    NITRU NEGATIVE 11/10/2020    WBCUA 5-9 11/10/2020    WBCUA 2-4 11/29/2011    BACTERIA NONE SEEN 11/10/2020    RBCUA NONE SEEN 11/10/2020    BLOODU NEGATIVE 11/10/2020    SPECGRAV 1.030 09/06/2018    GLUCOSEU NEGATIVE 11/10/2020       Radiology:  XR CHEST PORTABLE   Final Result   Impression:   CHF, increased.   Subsegmental atelectasis left lower lobe, new      This document has been electronically signed by: Samuel Chaudhari MD on    11/14/2020 01:45 AM         CT Head WO Contrast   Final Result    No evidence of an acute process. No change from prior. **This report has been created using voice recognition software. It may contain minor errors which are inherent in voice recognition technology. **      Final report electronically signed by Dr. Sarah Henderson on 11/10/2020 1:22 PM      XR CHEST PORTABLE   Final Result   Nodular opacities are noted left greater than right. **This report has been created using voice recognition software. It may contain minor errors which are inherent in voice recognition technology. **      Final report electronically signed by Dr. Israel Cerna on 11/10/2020 12:20 PM        Ct Head Wo Contrast    Result Date: 11/10/2020  PROCEDURE: CT HEAD WO CONTRAST CLINICAL INFORMATION: AMS. COVID 19 positive COMPARISON: Head CT 6/1/2016. TECHNIQUE: Noncontrast 5 mm axial images were obtained through the brain. Sagittal and coronal reconstructions were obtained. All CT scans at this facility use dose modulation, iterative reconstruction, and/or weight-based dosing when appropriate to reduce radiation dose to as low as reasonably achievable. FINDINGS: There is no hemorrhage. There is stable moderate-sized old infarct in the left frontoparietal junction at the level the vertex. There is associated ex vacuo dilation of the body of the left lateral ventricle. There are no new areas of abnormal attenuation. There is no hydrocephalus, midline shift or mass effect. There are vascular calcifications. The paranasal sinuses and mastoid air cells are normally aerated. There is no suspicious calvarial abnormality. No evidence of an acute process. No change from prior. **This report has been created using voice recognition software. It may contain minor errors which are inherent in voice recognition technology. ** Final report electronically signed by Dr. Sarah Henderson on 11/10/2020 1:22 PM    Xr Chest Portable    Result Date: 11/10/2020  PROCEDURE: XR CHEST PORTABLE CLINICAL INFORMATION: AMS. COMPARISON: Multiple previous most recent 5/3/2019 TECHNIQUE: AP upright view of the chest. FINDINGS: Mild cardiomegaly is similar to previous. There are small opacities inferiorly bilaterally. One of these is somewhat nodular on the left. Nodular infiltrate could cause this as could a lung nodule. Nodular opacities are noted left greater than right. **This report has been created using voice recognition software. It may contain minor errors which are inherent in voice recognition technology. ** Final report electronically signed by Dr. Franklin Akins on 11/10/2020 12:20 PM      Electronically signed by Ronal Suárez MD on 11/14/2020 at 3:05 PM

## 2020-11-14 NOTE — PROGRESS NOTES
[address] [place] [address]  INPATIENT PHYSICAL THERAPY  EVALUATION  [facility] ONC MED 5K - 5K-16/016-A    Time In: 1140  Time Out: 1573  Timed Code Treatment Minutes: 20 Minutes  Minutes: 34          Date: 2020  Patient Name: Rose Padron,  Gender:  female        MRN: [id number]  : 1940  (68 y.o.)      Referring Practitioner: Lexii Ha  Diagnosis: AMS  Additional Pertinent Hx: Pt admitted from [facility] due to altered mental status. Pt has Hx CVA wtih Rt hemiparesis and aphasia. Restrictions/Precautions:  Restrictions/Precautions: Fall Risk, Contact Precautions(Droplet +  COVID)    Subjective:  Chart Reviewed: Yes  Patient assessed for rehabilitation services?: Yes  Family / Caregiver Present: No  Subjective: Nurse approved session. Stated pt may be discharged back to [facility] soon. General:  Overall Orientation Status: (Unable to determine due to aphasia. Pt mumbling when asked a question, but unable to be understood.)  Follows Commands: Impaired    Vision: Within Functional Limits    Hearing: Within functional limits         Pain: /10:     Social/Functional History:    Lives With: Other (comment)(F)  Type of Home: Facility  Home Layout: One level  Home Access: Level entry         Additional Comments: Pt was unable to state her functional level or need for assist.  Information not able to be found on chart. OBJECTIVE:  Range of Motion:  Right Lower Extremity: WFL except df lacking approx 5 degrees to neutral.  Left Lower Extremity: CLEVE/Tu [employer] Mi [employer] SYSTEM PEMBROiLyngo    Strength:  Right Lower Extremity: Impaired - Rt hip ABD tolerated min resistance in supine, flexion 3+, knee ext 4-, ankle 3+  Left Lower Extremity: CLEVETripbirds PEMBROiLyngo    Balance:  Not tested. Pt too fatigued after multiple rolling for pericare. Bed Mobility:  Rolling to Left: Moderate Assistance   Rolling to Right: Moderate Assistance   Min assist to assume hooklying RLE, cues for LLE.   Once positioned, rolling to Rt min assist at trunk with cue and guidance for pt

## 2020-11-15 ENCOUNTER — APPOINTMENT (OUTPATIENT)
Dept: GENERAL RADIOLOGY | Age: 80
DRG: 177 | End: 2020-11-15
Payer: MEDICARE

## 2020-11-15 PROBLEM — G81.11 RIGHT SPASTIC HEMIPARESIS (HCC): Chronic | Status: ACTIVE | Noted: 2020-11-15

## 2020-11-15 LAB
ALBUMIN SERPL-MCNC: 2.8 G/DL (ref 3.5–5.1)
ALP BLD-CCNC: 75 U/L (ref 38–126)
ALT SERPL-CCNC: 23 U/L (ref 11–66)
ANION GAP SERPL CALCULATED.3IONS-SCNC: 16 MEQ/L (ref 8–16)
AST SERPL-CCNC: 13 U/L (ref 5–40)
BASOPHILS # BLD: 0.2 %
BASOPHILS ABSOLUTE: 0 THOU/MM3 (ref 0–0.1)
BILIRUB SERPL-MCNC: 0.2 MG/DL (ref 0.3–1.2)
BILIRUBIN DIRECT: < 0.2 MG/DL (ref 0–0.3)
BUN BLDV-MCNC: 25 MG/DL (ref 7–22)
CALCIUM SERPL-MCNC: 8.5 MG/DL (ref 8.5–10.5)
CHLORIDE BLD-SCNC: 102 MEQ/L (ref 98–111)
CO2: 25 MEQ/L (ref 23–33)
CREAT SERPL-MCNC: 1.3 MG/DL (ref 0.4–1.2)
EOSINOPHIL # BLD: 0 %
EOSINOPHILS ABSOLUTE: 0 THOU/MM3 (ref 0–0.4)
ERYTHROCYTE [DISTWIDTH] IN BLOOD BY AUTOMATED COUNT: 15.3 % (ref 11.5–14.5)
ERYTHROCYTE [DISTWIDTH] IN BLOOD BY AUTOMATED COUNT: 46.6 FL (ref 35–45)
GFR SERPL CREATININE-BSD FRML MDRD: 40 ML/MIN/1.73M2
GLUCOSE BLD-MCNC: 133 MG/DL (ref 70–108)
GLUCOSE BLD-MCNC: 148 MG/DL (ref 70–108)
GLUCOSE BLD-MCNC: 153 MG/DL (ref 70–108)
GLUCOSE BLD-MCNC: 153 MG/DL (ref 70–108)
GLUCOSE BLD-MCNC: 188 MG/DL (ref 70–108)
HCT VFR BLD CALC: 36.5 % (ref 37–47)
HEMOGLOBIN: 11.4 GM/DL (ref 12–16)
IMMATURE GRANS (ABS): 0.04 THOU/MM3 (ref 0–0.07)
IMMATURE GRANULOCYTES: 0.8 %
INR BLD: 2.41 (ref 0.85–1.13)
LYMPHOCYTES # BLD: 21.4 %
LYMPHOCYTES ABSOLUTE: 1.1 THOU/MM3 (ref 1–4.8)
MCH RBC QN AUTO: 25.8 PG (ref 26–33)
MCHC RBC AUTO-ENTMCNC: 31.2 GM/DL (ref 32.2–35.5)
MCV RBC AUTO: 82.6 FL (ref 81–99)
MONOCYTES # BLD: 3.2 %
MONOCYTES ABSOLUTE: 0.2 THOU/MM3 (ref 0.4–1.3)
NUCLEATED RED BLOOD CELLS: 0 /100 WBC
PLATELET # BLD: 235 THOU/MM3 (ref 130–400)
PMV BLD AUTO: 10.7 FL (ref 9.4–12.4)
POTASSIUM SERPL-SCNC: 4.3 MEQ/L (ref 3.5–5.2)
RBC # BLD: 4.42 MILL/MM3 (ref 4.2–5.4)
SEG NEUTROPHILS: 74.4 %
SEGMENTED NEUTROPHILS ABSOLUTE COUNT: 3.9 THOU/MM3 (ref 1.8–7.7)
SODIUM BLD-SCNC: 143 MEQ/L (ref 135–145)
TOTAL PROTEIN: 5.8 G/DL (ref 6.1–8)
WBC # BLD: 5.2 THOU/MM3 (ref 4.8–10.8)

## 2020-11-15 PROCEDURE — 82248 BILIRUBIN DIRECT: CPT

## 2020-11-15 PROCEDURE — 80053 COMPREHEN METABOLIC PANEL: CPT

## 2020-11-15 PROCEDURE — 2580000003 HC RX 258: Performed by: FAMILY MEDICINE

## 2020-11-15 PROCEDURE — 85025 COMPLETE CBC W/AUTO DIFF WBC: CPT

## 2020-11-15 PROCEDURE — 6370000000 HC RX 637 (ALT 250 FOR IP): Performed by: FAMILY MEDICINE

## 2020-11-15 PROCEDURE — 99223 1ST HOSP IP/OBS HIGH 75: CPT | Performed by: PSYCHIATRY & NEUROLOGY

## 2020-11-15 PROCEDURE — 82948 REAGENT STRIP/BLOOD GLUCOSE: CPT

## 2020-11-15 PROCEDURE — 85610 PROTHROMBIN TIME: CPT

## 2020-11-15 PROCEDURE — 2500000003 HC RX 250 WO HCPCS: Performed by: FAMILY MEDICINE

## 2020-11-15 PROCEDURE — 6370000000 HC RX 637 (ALT 250 FOR IP): Performed by: PHYSICIAN ASSISTANT

## 2020-11-15 PROCEDURE — 94669 MECHANICAL CHEST WALL OSCILL: CPT

## 2020-11-15 PROCEDURE — 6360000002 HC RX W HCPCS: Performed by: FAMILY MEDICINE

## 2020-11-15 PROCEDURE — 6370000000 HC RX 637 (ALT 250 FOR IP)

## 2020-11-15 PROCEDURE — 1200000003 HC TELEMETRY R&B

## 2020-11-15 PROCEDURE — 2700000000 HC OXYGEN THERAPY PER DAY

## 2020-11-15 PROCEDURE — 36415 COLL VENOUS BLD VENIPUNCTURE: CPT

## 2020-11-15 PROCEDURE — 71045 X-RAY EXAM CHEST 1 VIEW: CPT

## 2020-11-15 PROCEDURE — 99232 SBSQ HOSP IP/OBS MODERATE 35: CPT | Performed by: FAMILY MEDICINE

## 2020-11-15 RX ORDER — POTASSIUM CHLORIDE 20 MEQ/1
20 TABLET, EXTENDED RELEASE ORAL ONCE
Status: COMPLETED | OUTPATIENT
Start: 2020-11-15 | End: 2020-11-15

## 2020-11-15 RX ORDER — FUROSEMIDE 10 MG/ML
20 INJECTION INTRAMUSCULAR; INTRAVENOUS ONCE
Status: COMPLETED | OUTPATIENT
Start: 2020-11-15 | End: 2020-11-15

## 2020-11-15 RX ORDER — ERGOCALCIFEROL 1.25 MG/1
50000 CAPSULE ORAL WEEKLY
Status: DISCONTINUED | OUTPATIENT
Start: 2020-11-16 | End: 2020-11-19 | Stop reason: HOSPADM

## 2020-11-15 RX ORDER — WARFARIN SODIUM 4 MG/1
4 TABLET ORAL ONCE
Status: COMPLETED | OUTPATIENT
Start: 2020-11-15 | End: 2020-11-15

## 2020-11-15 RX ADMIN — REMDESIVIR 100 MG: 100 INJECTION, POWDER, LYOPHILIZED, FOR SOLUTION INTRAVENOUS at 16:44

## 2020-11-15 RX ADMIN — CARVEDILOL 6.25 MG: 6.25 TABLET, FILM COATED ORAL at 23:05

## 2020-11-15 RX ADMIN — WARFARIN SODIUM 4 MG: 4 TABLET ORAL at 17:03

## 2020-11-15 RX ADMIN — FERROUS SULFATE TAB 325 MG (65 MG ELEMENTAL FE) 325 MG: 325 (65 FE) TAB at 08:46

## 2020-11-15 RX ADMIN — PANTOPRAZOLE SODIUM 40 MG: 40 TABLET, DELAYED RELEASE ORAL at 08:47

## 2020-11-15 RX ADMIN — CITALOPRAM 10 MG: 20 TABLET, FILM COATED ORAL at 08:46

## 2020-11-15 RX ADMIN — Medication 50 MG: at 08:46

## 2020-11-15 RX ADMIN — LATANOPROST 1 DROP: 50 SOLUTION OPHTHALMIC at 23:06

## 2020-11-15 RX ADMIN — POTASSIUM CHLORIDE 20 MEQ: 1500 TABLET, EXTENDED RELEASE ORAL at 16:44

## 2020-11-15 RX ADMIN — OXYBUTYNIN CHLORIDE 10 MG: 10 TABLET, EXTENDED RELEASE ORAL at 08:47

## 2020-11-15 RX ADMIN — FUROSEMIDE 20 MG: 10 INJECTION, SOLUTION INTRAMUSCULAR; INTRAVENOUS at 16:44

## 2020-11-15 RX ADMIN — CARVEDILOL 6.25 MG: 6.25 TABLET, FILM COATED ORAL at 04:35

## 2020-11-15 RX ADMIN — DOCUSATE SODIUM 100 MG: 100 CAPSULE, LIQUID FILLED ORAL at 08:47

## 2020-11-15 RX ADMIN — OXYCODONE HYDROCHLORIDE AND ACETAMINOPHEN 500 MG: 500 TABLET ORAL at 08:46

## 2020-11-15 RX ADMIN — Medication 2000 UNITS: at 08:46

## 2020-11-15 RX ADMIN — DEXAMETHASONE SODIUM PHOSPHATE 6 MG: 4 INJECTION, SOLUTION INTRA-ARTICULAR; INTRALESIONAL; INTRAMUSCULAR; INTRAVENOUS; SOFT TISSUE at 18:30

## 2020-11-15 RX ADMIN — LISINOPRIL 40 MG: 40 TABLET ORAL at 08:46

## 2020-11-15 RX ADMIN — INSULIN GLARGINE 8 UNITS: 100 INJECTION, SOLUTION SUBCUTANEOUS at 09:05

## 2020-11-15 RX ADMIN — ATORVASTATIN CALCIUM 20 MG: 20 TABLET, FILM COATED ORAL at 08:46

## 2020-11-15 RX ADMIN — INSULIN GLARGINE 30 UNITS: 100 INJECTION, SOLUTION SUBCUTANEOUS at 23:06

## 2020-11-15 RX ADMIN — AMLODIPINE BESYLATE 10 MG: 10 TABLET ORAL at 08:45

## 2020-11-15 ASSESSMENT — PAIN SCALES - GENERAL: PAINLEVEL_OUTOF10: 0

## 2020-11-15 ASSESSMENT — PAIN SCALES - WONG BAKER: WONGBAKER_NUMERICALRESPONSE: 0

## 2020-11-15 NOTE — PROGRESS NOTES
Hospitalist Progress Note      Patient:  Patti Willamsr    Unit/Bed:5K-16/016-A  YOB: 1940  MRN: [id number]   Acct: [de-identified]   PCP: No primary care provider on file. Date of Admission: 11/10/2020    Assessment/Plan:    1. COVID-19 pneumonia: resulted + on 11/2. ok to continue Vitamin C, Vitamin D, Zinc. Pt was taken off Remdesivir (no evidence on MAR that this was received) and Dexa, s/p 3 doses per hospitalist. Now still requiring 1 lpm O2, remdesivir started 11/14, pharmacy consult to dose, appreciate input. Decadron 6 mg IV daily for 7 more doses ( start date 11/14). Pt was transferred out from [facility]-unit as it past 10 days from the time of test  2. Acute hypoxic respiratory failure due to COVID-19 pneumonia versus CHF: 88% on room air, requiring 1 L NC, maintain oxygen saturation 93% and above. Wean as tolerated. Chest x-ray on 11/14 showed increased CHF, subsegmental atelectasis in left lower lobe. Lasix 40 mg IV x 1 given 11/14. Will give another lasix 20 mg IV today. Check BNP  3. Acute metabolic encephalopathy: per previous hospitalist note: \"pt is A&Ox3 some days and only x2 at other times\". possibly related to infectious process. This past 2 days that Im seeing this pt, she is oriented to her name only. CT head on 11/10/2020 no acute process. Ammonia level normal.  UA on 11/10/2020 showed negative nitrite, small leukocyte esterase, WBC 5-9, negative blood. Neurology consult for further recommendation. 4. Acute on chronic HFpEF: Last echo on file was in 2016, EF of 60%. Chest x-ray showed CHF. Repeat echocardiogram pending. Give 1 dose of Lasix 40 mg IV on 11/14. Will give another 20 mg lasix IV today. Repeat cxr in am   5. Dysphagia: seen by SLP, recommended minced/ moist and thin liquids  6.  ELMER on CKD stage II, improving: Baseline creatinine 0.8-1.1, now 1.3 from 1.2. creatinine 1.5, on arrival.  Likely prerenal. Improved with IVF, off IVF due to CHF. Lisinopril had been held, resumed on 11/11. BMP in am, if worsening, hold lisinopril again   7. Hypocalcemia likely due to hypoalbuminemia and vit D deficiency: ical 1.00, replete per protocol and repeat in am.  vit D low and PTH normal. Start ergocalciferol, DC daily vit D. Recheck Vit D in 4 to 6 weeks in OP and f/u with PCP  8. 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 Stable at baseline  9. PAF on [address]: NSR/CVR on BB. On warfarin, Pharmacy to dose, appreciate input    10. IDDM: most recent Hgb A1c 10.2 in 2/19, repeat A1c down to 6.8%. Blood glucose appears labile, had hypoglycemia this past few days, reduced Lantus from 10units to 8 units subcu daily,reduced SSI from medium to low dose on 11/14 . On lantus 30 units HS. Lispro TID w meals held on 11/14 ( per RN, pt is not eating that much). Blood glucose better today. Cont current regimen. cont ACCU and hypoglycemia protocol. 11. Essential HTN, uncontrolled:  Cont lisinopril, coreg and amlodipine. Adjust meds prn. VS per protocol   12. Obesity: Body mass index is 43.59 kg/m². Chief Complaint: [address] course:    Per H/P note:    \"Due to patient's mentation and history of CVA with residual aphasia. History obtained by chart review and nursing.     This is a chronically ill 70-year-old female who presented to [facility] emergency department on 11/10 due to altered mental status from the nursing home. Nursing home called EMS around 10 AM.  Patient told ED provider that she was not sure why she was at the emergency department but stated \"she is feeling worse than usual.\"  Patient denies any pain at this time. Patient denies any alleviating or aggravating factors. Patient was from Covid unit at her nursing home.     Hospitalist will admit the patient for further work-up. \"    Subjective (past 24 hours):       Patient seen and examined.  Pt looks confused but she said no to sob, cough, fever, chills, chest pain        Medications:  Reviewed    Infusion Medications    dextrose       Scheduled Medications    warfarin  4 mg Oral Once    furosemide  20 mg Intravenous Once    And    potassium chloride  20 mEq Oral Once    insulin lispro  0-6 Units Subcutaneous TID WC    insulin lispro  0-3 Units Subcutaneous Nightly    insulin glargine  8 Units Subcutaneous Daily    dexamethasone  6 mg Intravenous Q24H    remdesivir IVPB  100 mg Intravenous Q24H    calcium replacement protocol   Other RX Placeholder    calcium carbonate  500 mg Oral Once per day on Mon Wed Fri    carvedilol  6.25 mg Oral BID    citalopram  10 mg Oral Daily    docusate sodium  100 mg Oral Daily    atorvastatin  20 mg Oral Daily    oxybutynin  1 tablet Oral Daily    pantoprazole  40 mg Oral Daily    ferrous sulfate  325 mg Oral Daily    Vitamin D  2,000 Units Oral Daily    vitamin C  500 mg Oral Daily    zinc sulfate  50 mg Oral Daily    warfarin (COUMADIN) daily dosing (placeholder)   Other RX Placeholder    amLODIPine  10 mg Oral Daily    lisinopril  40 mg Oral Daily    insulin glargine  30 Units Subcutaneous Nightly    [Held by provider] insulin lispro  4 Units Subcutaneous TID     latanoprost  1 drop Both Eyes Nightly     PRN Meds: sodium chloride flush, glucose, dextrose, glucagon (rDNA), dextrose, acetaminophen **OR** acetaminophen      Intake/Output Summary (Last 24 hours) at 11/15/2020 1627  Last data filed at 11/15/2020 1410  Gross per 24 hour   Intake 560 ml   Output 800 ml   Net -240 ml       Diet:  DIET DYSPHAGIA MINCED AND MOIST; No Drinking Straw    Exam:  BP (!) 144/66   Pulse 59   Temp 98.5 °F (36.9 °C) (Oral)   Resp 18   Ht 5' 2\" (1.575 m)   Wt 238 lb 5.1 oz (108.1 kg)   SpO2 93%   BMI 43.59 kg/m²   General appearance: No apparent distress, appears stated age and cooperative. HEENT: Pupils equal, round, and reactive to light. Conjunctivae clear.   Neck: Supple, with full range of motion. No jugular venous distention. Trachea midline. Respiratory: On 1 lpm O2 via NC. Diminished air entry   Cardiovascular: normal rate, regular rhythm with normal S1/S2 without murmurs, rubs or gallops. Abdomen: Soft, non-tender, non-distended with normal bowel sounds. Musculoskeletal: passive and active ROM x 4 extremities. Skin: Skin color, texture, turgor normal.  No rashes or lesions. Neurologic:  alert, oriented to self only, normal speech, no focal findings or movement disorder noted. Right UE 0/5 ( chronic), RLE: 3/5  Exam of extremities: peripheral pulses normal, no pedal edema, trace pitting edema on both distal legs, no clubbing or cyanosis    Labs:   Recent Labs     11/13/20  1035 11/14/20  0640 11/15/20  0648   WBC 10.2 7.9 5.2   HGB 12.1 11.1* 11.4*   HCT 39.6 35.9* 36.5*    218 235     Recent Labs     11/12/20  2253 11/13/20  1035 11/14/20  0640    138 139   K 4.0 4.2 3.5    100 103   CO2 25 26 28   BUN 32* 29* 24*   CREATININE 1.2 1.3* 1.2   CALCIUM 8.1* 8.5 8.0*     Recent Labs     11/13/20  1035 11/14/20  0640 11/15/20  0648   AST 22 15 13   ALT 30 24 23   BILIDIR <0.2 <0.2 <0.2   BILITOT 0.3 0.3 0.2*   ALKPHOS 78 65 75     Recent Labs     11/13/20  1035 11/14/20  0640 11/15/20  0648   INR 3.03* 3.38* 2.41*     No results for input(s): CKTOTAL, TROPONINI in the last 72 hours. Microbiology:    Blood culture #1:   Lab Results   Component Value Date    BC No growth-preliminary  11/10/2020       Blood culture #2:No results found for: Saqib Kelley    Organism:  Lab Results   Component Value Date    ORG Escherichia coli 08/11/2016         Lab Results   Component Value Date    LABGRAM  04/13/2015     Rare segmented neutrophils observed. No organisms observed.          MRSA culture only:No results found for: [facility]    Urine culture:   Lab Results   Component Value Date    LABURIN [place] count: >100,000 CFU/mL 08/11/2016       Respiratory culture: No results found for: CULTRESP    Aerobic and Anaerobic :  No results found for: LABAERO  Lab Results   Component Value Date    LABANAE No growth-preliminary  No growth   04/13/2015       Urinalysis:      Lab Results   Component Value Date    NITRU NEGATIVE 11/10/2020    WBCUA 5-9 11/10/2020    WBCUA 2-4 11/29/2011    BACTERIA NONE SEEN 11/10/2020    RBCUA NONE SEEN 11/10/2020    BLOODU NEGATIVE 11/10/2020    SPECGRAV 1.030 09/06/2018    GLUCOSEU NEGATIVE 11/10/2020       Radiology:  XR CHEST PORTABLE   Final Result   Impression:   Stable exam.      This document has been electronically signed by: Iraida Barfield MD on    11/15/2020 06:10 AM         XR CHEST PORTABLE   Final Result   Impression:   CHF, increased. Subsegmental atelectasis left lower lobe, new      This document has been electronically signed by: Hu Baker MD on    11/14/2020 01:45 AM         CT Head WO Contrast   Final Result    No evidence of an acute process. No change from prior. **This report has been created using voice recognition software. It may contain minor errors which are inherent in voice recognition technology. **      Final report electronically signed by Dr. Shamika Tobar on 11/10/2020 1:22 PM      XR CHEST PORTABLE   Final Result   Nodular opacities are noted left greater than right. **This report has been created using voice recognition software. It may contain minor errors which are inherent in voice recognition technology. **      Final report electronically signed by Dr. Mike Bustamante on 11/10/2020 12:20 PM        Ct Head Wo Contrast    Result Date: 11/10/2020  PROCEDURE: CT HEAD WO CONTRAST CLINICAL INFORMATION: AMS. COVID 19 positive COMPARISON: Head CT 6/1/2016. TECHNIQUE: Noncontrast 5 mm axial images were obtained through the brain. Sagittal and coronal reconstructions were obtained.  All CT scans at this facility use dose modulation, iterative reconstruction, and/or weight-based dosing when appropriate to reduce radiation dose to as low as reasonably achievable. FINDINGS: There is no hemorrhage. There is stable moderate-sized old infarct in the left frontoparietal junction at the level the vertex. There is associated ex vacuo dilation of the body of the left lateral ventricle. There are no new areas of abnormal attenuation. There is no hydrocephalus, midline shift or mass effect. There are vascular calcifications. The paranasal sinuses and mastoid air cells are normally aerated. There is no suspicious calvarial abnormality. No evidence of an acute process. No change from prior. **This report has been created using voice recognition software. It may contain minor errors which are inherent in voice recognition technology. ** Final report electronically signed by Dr. Demarcus Spear on 11/10/2020 1:22 PM    Xr Chest Portable    Result Date: 11/10/2020  PROCEDURE: XR CHEST PORTABLE CLINICAL INFORMATION: AMS. COMPARISON: Multiple previous most recent 5/3/2019 TECHNIQUE: AP upright view of the chest. FINDINGS: Mild cardiomegaly is similar to previous. There are small opacities inferiorly bilaterally. One of these is somewhat nodular on the left. Nodular infiltrate could cause this as could a lung nodule. Nodular opacities are noted left greater than right. **This report has been created using voice recognition software. It may contain minor errors which are inherent in voice recognition technology. ** Final report electronically signed by Dr. Andreia Jaquez on 11/10/2020 12:20 PM      Electronically signed by Gomez Garza MD on 11/15/2020 at 4:27 PM

## 2020-11-15 NOTE — PROGRESS NOTES
Clinical Pharmacy Note    Warfarin consult follow-up    Recent Labs     11/15/20  0648   INR 2.41*     Recent Labs     11/13/20  1035 11/14/20  0640 11/15/20  0648   HGB 12.1 11.1* 11.4*   HCT 39.6 35.9* 36.5*    218 235       Significant Drug-Drug Interactions:  New warfarin drug-drug interactions: dexamethasone  Discontinued drug-drug interactions: none  Current warfarin drug-drug interactions: citalopram, dexamethasone, vitamin C      Date INR Warfarin Dose   11/10/2020 2.46 4 mg   11/11/2020 2.59 4 mg   11/12/2020 2.84 4 mg   11/13/2020 3.03 3 mg   11/14/2020 3.38 0.5 mg   11/15/2020 2.41 4 mg               Notes:                     Daily PT/INR until stable within therapeutic range.

## 2020-11-15 NOTE — PROGRESS NOTES
Patient resting quietly with her eyes closed. Did not disturb the patient. No family present. Palliative care will follow up tomorrow.

## 2020-11-15 NOTE — CONSULTS
NEUROLOGY CONSULT NOTE      Requesting Physician: Anthony Licea MD    Reason for Consult:  Evaluate for altered mental state      History of Present Illness:  Wenceslao Joshi is a 68 y.o. female admitted to [place] [address] on 11/10/2020. She presented to [facility] emergency department on 11/10 due to altered mental status from the nursing home. Nursing home called EMS around 10 AM.    Patient was from +ve Covid unit since 11/2 at her nursing home. CT head done 11/10 showed no acute findings. She does have previous history of stroke with right spastic hemiparesis. Patient is poor historian, history obtained from review of medical record. Past Medical History:        Diagnosis Date    Abdominal pain 12/14/2014    [facility] CKD [facility] [address]     Acute on chronic [facility] failure [facility] fraction [facility]pEF[facility]     Atrial fibrillation ([facility] [address]     CAD (coronary artery disease)     Cerebrovascular disease     Depression     Hyperlipidemia     Hypertension     Nausea and vomiting 4/13/2015    Self-care deficit for dressing and grooming     Self-care deficit for medication administration 2010    Type II or unspecified type diabetes mellitus without mention of complication, not stated as uncontrolled 2010    Unspecified cerebral artery occlusion with cerebral infarction            Procedure Laterality Date    CARDIAC CATHETERIZATION  2004    CHOLECYSTECTOMY  1965    COLONOSCOPY      COLONOSCOPY N/A 5/1/2019    COLONOSCOPY POLYPECTOMY SNARE/COLD BIOPSY performed by Camilo Alas MD at [address] REPLACEMENT  2008    Lt Total Knee    JOINT REPLACEMENT  2009    Rt Total Knee       Allergies:     Allergies   Allergen Reactions    Papaya Derivatives Hives    Pcn [Penicillins] Hives        Current Medications:   warfarin (COUMADIN) tablet 4 mg, Once  insulin lispro (HUMALOG) injection vial 0-6 Units, TID WC  insulin lispro (HUMALOG) injection vial 0-3 Units, Nightly  insulin glargine (LANTUS) injection vial 8 Units, Daily  Dexamethasone Sodium Phosphate injection 6 mg, Q24H  remdesivir 100 mg in sodium chloride 0.9 % 250 mL IVPB, Q24H  sodium chloride flush 0.9 % injection 30 mL, PRN  calcium replacement protocol, RX Placeholder  glucose (GLUTOSE) 40 % oral gel 15 g, PRN  dextrose 50 % IV solution, PRN  glucagon (rDNA) injection 1 mg, PRN  dextrose 5 % solution, PRN  calcium carbonate (TUMS) chewable tablet 500 mg, Once per day on Mon Wed Fri  carvedilol (COREG) tablet 6.25 mg, BID  citalopram (CELEXA) tablet 10 mg, Daily  docusate sodium (COLACE) capsule 100 mg, Daily  atorvastatin (LIPITOR) tablet 20 mg, Daily  oxybutynin (DITROPAN-XL) extended release tablet 10 mg, Daily  pantoprazole (PROTONIX) tablet 40 mg, Daily  ferrous sulfate (IRON 325) tablet 325 mg, Daily  acetaminophen (TYLENOL) tablet 650 mg, Q6H PRN    Or  acetaminophen (TYLENOL) suppository 650 mg, Q6H PRN  Vitamin D (CHOLECALCIFEROL) tablet 2,000 Units, Daily  vitamin C (ASCORBIC ACID) tablet 500 mg, Daily  zinc sulfate (ZINCATE) capsule 50 mg, Daily  warfarin (COUMADIN) daily dosing (placeholder), RX Placeholder  amLODIPine (NORVASC) tablet 10 mg, Daily  lisinopril (PRINIVIL;ZESTRIL) tablet 40 mg, Daily  insulin glargine (LANTUS) injection vial 30 Units, Nightly  [Held by provider] insulin lispro (HUMALOG) injection vial 4 Units, TID WC  latanoprost (XALATAN) 0.005 % ophthalmic solution 1 drop, Nightly         Social History:  Social History     Tobacco Use   Smoking Status Never Smoker   Smokeless Tobacco Never Used     Social History     Substance and Sexual Activity   Alcohol Use No     Social History     Substance and Sexual Activity   Drug Use No         Family History:       Problem Relation Age of Onset    Cancer Mother         kidney       Review of Systems:  Unobtainable due to patient condition.      I reviewed the to touch  Superficial temporal artery pulses are normal.   Musculoskeletal: Has no hand arthritis, no limitation of ROM in any of the four extremities, except right upper and right lower extremities (appears chronic). no joint tenderness, deformity or swelling. There is no leg edema. The Heart was regular in rate and rhythm. No heart murmur  Chest- decreased air entry,  good effort. Abdomen: soft, intact bowel sounds. Labs:    CBC:   Recent Labs     11/13/20  1035 11/14/20  0640 11/15/20  0648   WBC 10.2 7.9 5.2   HGB 12.1 11.1* 11.4*    218 235   MCV 84.6 83.9 82.6   MCH 25.9* 25.9* 25.8*   MCHC 30.6* 30.9* 31.2*     CMP:  Recent Labs     11/12/20  2253 11/13/20  1035 11/14/20  0640    138 139   K 4.0 4.2 3.5    100 103   CO2 25 26 28   BUN 32* 29* 24*   CREATININE 1.2 1.3* 1.2   LABGLOM 43* 40* 43*   GLUCOSE 150* 133* 93   CALCIUM 8.1* 8.5 8.0*     Liver:   Recent Labs     11/15/20  0648   AST 13   ALT 23   ALKPHOS 75   PROT 5.8*   LABALBU 2.8*   BILITOT 0.2*     Reviewed   Results for orders placed during the hospital encounter of 11/10/20   CT Head WO Contrast    Narrative PROCEDURE: CT HEAD WO CONTRAST    CLINICAL INFORMATION: AMS. COVID 19 positive    COMPARISON: Head CT 6/1/2016. TECHNIQUE: Noncontrast 5 mm axial images were obtained through the brain. Sagittal and coronal reconstructions were obtained. All CT scans at this facility use dose modulation, iterative reconstruction, and/or weight-based dosing when appropriate to reduce radiation dose to as low as reasonably achievable. FINDINGS:        There is no hemorrhage. There is stable moderate-sized old infarct in the left frontoparietal junction at the level the vertex. There is associated ex vacuo dilation of the body of the left lateral ventricle. There are no new areas of abnormal   attenuation. There is no hydrocephalus, midline shift or mass effect. There are vascular calcifications.      The paranasal sinuses

## 2020-11-16 ENCOUNTER — APPOINTMENT (OUTPATIENT)
Dept: GENERAL RADIOLOGY | Age: 80
DRG: 177 | End: 2020-11-16
Payer: MEDICARE

## 2020-11-16 LAB
ALBUMIN SERPL-MCNC: 2.6 G/DL (ref 3.5–5.1)
ALP BLD-CCNC: 67 U/L (ref 38–126)
ALT SERPL-CCNC: 20 U/L (ref 11–66)
ANION GAP SERPL CALCULATED.3IONS-SCNC: 9 MEQ/L (ref 8–16)
AST SERPL-CCNC: 11 U/L (ref 5–40)
BASOPHILS # BLD: 0.1 %
BASOPHILS ABSOLUTE: 0 THOU/MM3 (ref 0–0.1)
BILIRUB SERPL-MCNC: 0.2 MG/DL (ref 0.3–1.2)
BILIRUBIN DIRECT: < 0.2 MG/DL (ref 0–0.3)
BLOOD CULTURE, ROUTINE: NORMAL
BLOOD CULTURE, ROUTINE: NORMAL
BUN BLDV-MCNC: 28 MG/DL (ref 7–22)
CALCIUM SERPL-MCNC: 8.7 MG/DL (ref 8.5–10.5)
CHLORIDE BLD-SCNC: 104 MEQ/L (ref 98–111)
CO2: 29 MEQ/L (ref 23–33)
CREAT SERPL-MCNC: 1.2 MG/DL (ref 0.4–1.2)
EOSINOPHIL # BLD: 0 %
EOSINOPHILS ABSOLUTE: 0 THOU/MM3 (ref 0–0.4)
ERYTHROCYTE [DISTWIDTH] IN BLOOD BY AUTOMATED COUNT: 15.3 % (ref 11.5–14.5)
ERYTHROCYTE [DISTWIDTH] IN BLOOD BY AUTOMATED COUNT: 46.4 FL (ref 35–45)
GFR SERPL CREATININE-BSD FRML MDRD: 43 ML/MIN/1.73M2
GLUCOSE BLD-MCNC: 110 MG/DL (ref 70–108)
GLUCOSE BLD-MCNC: 110 MG/DL (ref 70–108)
GLUCOSE BLD-MCNC: 119 MG/DL (ref 70–108)
GLUCOSE BLD-MCNC: 140 MG/DL (ref 70–108)
HCT VFR BLD CALC: 35.8 % (ref 37–47)
HEMOGLOBIN: 11.2 GM/DL (ref 12–16)
IMMATURE GRANS (ABS): 0.04 THOU/MM3 (ref 0–0.07)
IMMATURE GRANULOCYTES: 0.5 %
INR BLD: 2.02 (ref 0.85–1.13)
LYMPHOCYTES # BLD: 22.3 %
LYMPHOCYTES ABSOLUTE: 1.7 THOU/MM3 (ref 1–4.8)
MCH RBC QN AUTO: 26.2 PG (ref 26–33)
MCHC RBC AUTO-ENTMCNC: 31.3 GM/DL (ref 32.2–35.5)
MCV RBC AUTO: 83.6 FL (ref 81–99)
MONOCYTES # BLD: 3.6 %
MONOCYTES ABSOLUTE: 0.3 THOU/MM3 (ref 0.4–1.3)
NUCLEATED RED BLOOD CELLS: 0 /100 WBC
PLATELET # BLD: 276 THOU/MM3 (ref 130–400)
PMV BLD AUTO: 10.8 FL (ref 9.4–12.4)
POTASSIUM SERPL-SCNC: 4.3 MEQ/L (ref 3.5–5.2)
RBC # BLD: 4.28 MILL/MM3 (ref 4.2–5.4)
SEG NEUTROPHILS: 73.5 %
SEGMENTED NEUTROPHILS ABSOLUTE COUNT: 5.5 THOU/MM3 (ref 1.8–7.7)
SODIUM BLD-SCNC: 142 MEQ/L (ref 135–145)
TOTAL PROTEIN: 6.1 G/DL (ref 6.1–8)
WBC # BLD: 7.5 THOU/MM3 (ref 4.8–10.8)

## 2020-11-16 PROCEDURE — 97110 THERAPEUTIC EXERCISES: CPT

## 2020-11-16 PROCEDURE — 97167 OT EVAL HIGH COMPLEX 60 MIN: CPT

## 2020-11-16 PROCEDURE — 2580000003 HC RX 258: Performed by: FAMILY MEDICINE

## 2020-11-16 PROCEDURE — 92526 ORAL FUNCTION THERAPY: CPT

## 2020-11-16 PROCEDURE — 6360000002 HC RX W HCPCS: Performed by: FAMILY MEDICINE

## 2020-11-16 PROCEDURE — 6370000000 HC RX 637 (ALT 250 FOR IP): Performed by: PHYSICIAN ASSISTANT

## 2020-11-16 PROCEDURE — 36415 COLL VENOUS BLD VENIPUNCTURE: CPT

## 2020-11-16 PROCEDURE — 6370000000 HC RX 637 (ALT 250 FOR IP): Performed by: FAMILY MEDICINE

## 2020-11-16 PROCEDURE — 6370000000 HC RX 637 (ALT 250 FOR IP)

## 2020-11-16 PROCEDURE — 1200000000 HC SEMI PRIVATE

## 2020-11-16 PROCEDURE — 99232 SBSQ HOSP IP/OBS MODERATE 35: CPT | Performed by: NURSE PRACTITIONER

## 2020-11-16 PROCEDURE — 80053 COMPREHEN METABOLIC PANEL: CPT

## 2020-11-16 PROCEDURE — 97530 THERAPEUTIC ACTIVITIES: CPT

## 2020-11-16 PROCEDURE — 95819 EEG AWAKE AND ASLEEP: CPT

## 2020-11-16 PROCEDURE — 85025 COMPLETE CBC W/AUTO DIFF WBC: CPT

## 2020-11-16 PROCEDURE — 85610 PROTHROMBIN TIME: CPT

## 2020-11-16 PROCEDURE — 94669 MECHANICAL CHEST WALL OSCILL: CPT

## 2020-11-16 PROCEDURE — 97535 SELF CARE MNGMENT TRAINING: CPT

## 2020-11-16 PROCEDURE — 71045 X-RAY EXAM CHEST 1 VIEW: CPT

## 2020-11-16 PROCEDURE — 95816 EEG AWAKE AND DROWSY: CPT | Performed by: PSYCHIATRY & NEUROLOGY

## 2020-11-16 PROCEDURE — 82948 REAGENT STRIP/BLOOD GLUCOSE: CPT

## 2020-11-16 PROCEDURE — 82248 BILIRUBIN DIRECT: CPT

## 2020-11-16 PROCEDURE — 2500000003 HC RX 250 WO HCPCS: Performed by: FAMILY MEDICINE

## 2020-11-16 RX ORDER — CLONIDINE HYDROCHLORIDE 0.1 MG/1
0.1 TABLET ORAL ONCE
Status: DISCONTINUED | OUTPATIENT
Start: 2020-11-16 | End: 2020-11-19 | Stop reason: HOSPADM

## 2020-11-16 RX ORDER — WARFARIN SODIUM 4 MG/1
4 TABLET ORAL
Status: COMPLETED | OUTPATIENT
Start: 2020-11-16 | End: 2020-11-16

## 2020-11-16 RX ADMIN — ATORVASTATIN CALCIUM 20 MG: 20 TABLET, FILM COATED ORAL at 10:37

## 2020-11-16 RX ADMIN — OXYBUTYNIN CHLORIDE 10 MG: 10 TABLET, EXTENDED RELEASE ORAL at 10:36

## 2020-11-16 RX ADMIN — Medication 50 MG: at 10:34

## 2020-11-16 RX ADMIN — WARFARIN SODIUM 4 MG: 4 TABLET ORAL at 21:31

## 2020-11-16 RX ADMIN — DOCUSATE SODIUM 100 MG: 100 CAPSULE, LIQUID FILLED ORAL at 10:34

## 2020-11-16 RX ADMIN — FERROUS SULFATE TAB 325 MG (65 MG ELEMENTAL FE) 325 MG: 325 (65 FE) TAB at 10:35

## 2020-11-16 RX ADMIN — CITALOPRAM 10 MG: 20 TABLET, FILM COATED ORAL at 10:39

## 2020-11-16 RX ADMIN — INSULIN GLARGINE 8 UNITS: 100 INJECTION, SOLUTION SUBCUTANEOUS at 10:38

## 2020-11-16 RX ADMIN — LATANOPROST 1 DROP: 50 SOLUTION OPHTHALMIC at 21:31

## 2020-11-16 RX ADMIN — LISINOPRIL 40 MG: 40 TABLET ORAL at 10:36

## 2020-11-16 RX ADMIN — OXYCODONE HYDROCHLORIDE AND ACETAMINOPHEN 500 MG: 500 TABLET ORAL at 10:34

## 2020-11-16 RX ADMIN — DEXAMETHASONE SODIUM PHOSPHATE 6 MG: 4 INJECTION, SOLUTION INTRA-ARTICULAR; INTRALESIONAL; INTRAMUSCULAR; INTRAVENOUS; SOFT TISSUE at 17:47

## 2020-11-16 RX ADMIN — PANTOPRAZOLE SODIUM 40 MG: 40 TABLET, DELAYED RELEASE ORAL at 10:34

## 2020-11-16 RX ADMIN — REMDESIVIR 100 MG: 100 INJECTION, POWDER, LYOPHILIZED, FOR SOLUTION INTRAVENOUS at 17:46

## 2020-11-16 NOTE — PROGRESS NOTES
[address] [place] [address]  INPATIENT PHYSICAL THERAPY  DAILY NOTE  [facility] ONC MED 5K - 5K-10/010-A     Time In: 5497  Time Out: 1620  Timed Code Treatment Minutes: 23 Minutes  Minutes: 23          Date: 2020  Patient Name: Chavo Lemus,  Gender:  female        MRN: [id number]  : 1940  (68 y.o.)     Referring Practitioner: Rubina Alegria  Diagnosis: AMS  Additional Pertinent Hx: Pt admitted from [facility] ECF due to altered mental status. Pt has Hx CVA wtih Rt hemiparesis and aphasia. Prior Level of Function:  Lives With: Other (comment)(ECF)  Type of Home: Facility  Home Layout: One level  Home Access: Level entry        Additional Comments: Pt was unable to state her functional level or need for assist.  Information not able to be found on chart. Restrictions/Precautions:  Restrictions/Precautions: Fall Risk, Contact Precautions(Droplet +  COVID)  Position Activity Restriction  Other position/activity restrictions: Out of isolation      SUBJECTIVE: Pt resting in bed and agrees to try activity. PAIN: not reported     OBJECTIVE:  Bed Mobility:  Supine to Sit: Moderate Assistance  Sit to Supine: Moderate Assistance     Transfers:  Not Tested    Ambulation:  Not tested    Balance:  Static Sitting Balance:  Stand By Assistance  Dynamic Sitting Balance: Stand By Assistance    Exercise:  Patient was guided in 1 set(s) 10 reps of exercise to both lower extremities. Ankle pumps, Glut sets, Quad sets, Heelslides, Hip abduction/adduction, Seated marches, Seated heel/toe raises and Long arc quads. Exercises were completed for increased independence with functional mobility. Functional Outcome Measures: Completed  AM-PAC Inpatient Mobility without Stair Climbing Raw Score : 7  AM-PAC Inpatient without Stair Climbing T-Scale Score : 28.66    ASSESSMENT:  Assessment: Patient progressing toward established goals. Activity Tolerance:  Patient tolerance of  treatment: good.        Equipment Recommendations:Equipment Needed: No  Discharge Recommendations:   ECF with PT    Plan: Times per week: 2-3x/wk  Current Treatment Recommendations: Strengthening, Functional Mobility Training, Balance Training, ROM(PT to assess transfers when appropriate)    Patient Education  Patient Education: Plan of Care, Home Exercise Program    Goals:  Patient goals : Unable to state  Short term goals  Time Frame for Short term goals: until discharge  Short term goal 1: Pt to roll to either direction with rail assist, CGA for position change in bed. Short term goal 2: Pt to go supine <->sit, mod +1 to get up to sit. Short term goal 3: Pt to sit edge of bed, +1 CGA x3 min for improved sitting balance. Short term goal 4: PT to assess transfers when appropriate. Following session, patient left in safe position with all fall risk precautions in place. Hair Dia.  Roel Amin, [place] [address]

## 2020-11-16 NOTE — PROGRESS NOTES
[facility] Laboratory Technician Worksheet      EEG Date: 2020    Name: Analia [place]   : 1940   Age: 68 y.o. SEX: female    ROOM: [address] [place] [address] MRN: [id number]           CSN: [id number]      Ordering Provider: Graciela Kiran  EEG Number: [id number] Time of Test:  8453    Hand: Right   Sedation: no    H.V. Done: No NOT DONE Photic: No    Sleep: Yes  Drowsy: Yes   Sleep Deprived: No    Seizures observed: no    Mentality: alert      Clinical History:AMS AT ECF. COVID .      Past Medical History:       Diagnosis Date    Abdominal pain 2014    Acute kidney injury superimposed on CKD (HCC)     Acute on chronic heart failure with preserved ejection fraction (HFpEF) (HCC)     Atrial fibrillation (HCC)     CAD (coronary artery disease)     Cerebrovascular disease     Depression     Hyperlipidemia     Hypertension     Nausea and vomiting 2015    Self-care deficit for dressing and grooming     Self-care deficit for medication administration     Type II or unspecified type diabetes mellitus without mention of complication, not stated as uncontrolled     Unspecified cerebral artery occlusion with cerebral infarction        Scheduled Meds:   vitamin D  50,000 Units Oral Weekly    insulin lispro  0-6 Units Subcutaneous TID WC    insulin lispro  0-3 Units Subcutaneous Nightly    insulin glargine  8 Units Subcutaneous Daily    dexamethasone  6 mg Intravenous Q24H    remdesivir IVPB  100 mg Intravenous Q24H    calcium replacement protocol   Other RX Placeholder    calcium carbonate  500 mg Oral Once per day on     carvedilol  6.25 mg Oral BID    citalopram  10 mg Oral Daily    docusate sodium  100 mg Oral Daily    atorvastatin  20 mg Oral Daily    oxybutynin  1 tablet Oral Daily    pantoprazole  40 mg Oral Daily    ferrous sulfate  325 mg Oral Daily    vitamin C  500 mg Oral Daily    zinc sulfate  50 mg Oral Daily    warfarin (COUMADIN) daily dosing (placeholder)   Other RX Placeholder    amLODIPine  10 mg Oral Daily    lisinopril  40 mg Oral Daily    insulin glargine  30 Units Subcutaneous Nightly    [Held by provider] insulin lispro  4 Units Subcutaneous TID     latanoprost  1 drop Both Eyes Nightly     Continuous Infusions:   dextrose       PRN Meds:.sodium chloride flush, glucose, dextrose, glucagon (rDNA), dextrose, acetaminophen **OR** acetaminophen    Technician: Yamile Singh 11/16/2020

## 2020-11-16 NOTE — PROGRESS NOTES
Phone call made to patient's son, Ashley De León at [phone]. Ashley De León states that the patient's  is  and that he and his sister are the only two children. Ashley De León states that he and his sister are usually in agreement with medical decisions. Discussed code status levels and what each level entails. Ashley De León states that he does not believe that the patient would want any resuscitative measures and would not wish to be on a ventilator, not even short term. Discussed that a DNR CCA with no intubation code status would be ordered and that this code status would follow the patient back to the ECF. Did briefly discuss concerns regarding adequate intake and encouraged Ashley to speak with his sister regarding patient's wishes if a feeding tube would be recommended by a doctor. Discussed that a feeding tube would allow for adequate hydration/nutrition but does not protect against aspiration if the patient is experiencing dysphagia. Briefly discussed comfort care and when this code status would be appropriate. Emotional support provided. Updated Wolf Olivas RN. Updated Neel Mail CNP and order received to change the code status. [place] DNR/prescription to indicate DO NOT INTUBATE completed and placed in chart [facility] for MD signature. Please call palliative care if further needs arise.

## 2020-11-16 NOTE — PROGRESS NOTES
[address] [address]  INPATIENT SPEECH THERAPY  STR[facility] ONC MED 5K  DAILY NOTE    TIME   SLP Individual Minutes  Time In: 6453  Time Out: 3253  Minutes: 12  Timed Code Treatment Minutes: 0 Minutes       Date: 2020  Patient Name: Shantell Mace      CSN: [id number]   : 1940  (68 y.o.)  Gender: female   Referring Physician: Maryuri Champion MD  Diagnosis: AMS  Secondary Diagnosis: Dysphagia; cognitive-linguistic deficits   Precautions: Fall risk, aspiration precautions   Current Diet: Minced/moist and thin liquids   Swallowing Strategies: Avoidance of mixed consistencies, no straws, medications in applesauce, supervision with meals, CLOSE pulmonary monitoring, no straws  Date of Last MBS: Not Applicable    Pain:  No pain reported. Subjective:  RNKristy, approved tx session this date. Upon arrival into room, pt sleeping. Pt easily roused to verbal cue and was agreeable to participate in po trials of thin liquids and minced and moist diet. Short-Term Goals:  SHORT TERM GOAL #1:  Goal 1: Pt will consume a minced/moist diet and thin liquids (restriction on mixed consistencies and straw) without overt s/s of aspiration to meet nutritional/hydration measures safely. INTERVENTIONS: Skilled dysphagia therapy via completion of PO trials. Pt with trials of cracker moistened in applesauce x5 and sips of thin liquids by cup x10.   *Pt demonstrations of slow/impaired mastication and AP transit resulting in need for extra time to achieve effective bolus breakdown and formation. Appears to be tolerating modified textures with good success. Appropriate oral clearance with extra time, liquid wash, and puree bolus. Certainly cannot r/o premature pharyngeal entry without formal imaging. Intermittent slight audible swallow present. Pt with no overt s/s of aspiration 9/10 trials with thin liquids via cup sips and 5/5 trials with minced and moist texture.  Pt observed with cough immediately following cup sip of thin liquids 1/10 trials this date. Recommendations for pt to resume minced/moist diet and thin liquids. Will continue to monitor pt need for potential instrumental assessment. SHORT TERM GOAL #2:  Goal 2: Pt will consume advanced solids with ST only without overt s/s of aspiration in 10/10 trials for potential dietary advancement to least restrictive PO diet. INTERVENTIONS: Pt inappropriate for advanced solids this date based on trials of minced and moist texture. SHORT TERM GOAL #3:  Goal 3: Monitor pulmonary status and pt tolerance of PO diet with completion of FEES as clinically appropriate/indicated. INTERVENTIONS: See STG 1.     Long-Term Goals:   No established LTG's given short ELOS    EDUCATION:  Learner: Patient  Education:  Reviewed diet and strategies, Reviewed ST goals and Plan of Care and Reviewed recommendations for follow-up  Evaluation of Education: Demonstrates with assistance, Needs further instruction and Family not present    ASSESSMENT/PLAN:  Activity Tolerance:  Patient tolerance of  treatment: good. Assessment/Plan: Patient progressing toward established goals. Continues to require skilled care of licensed speech pathologist to progress toward achievement of established goals and plan of care. .     Plan for Next Session: skilled dietary analysis, advanced solids as appropriate, monitor need for MBS     Dafne Boyd M.A., [address]

## 2020-11-16 NOTE — PROGRESS NOTES
replete per protocol. vit D low and PTH normal. Start ergocalciferol, DC daily vit D. Recheck Vit D in 4 to 6 weeks in OP and f/u with PCP  8. 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 Stable at baseline  9. PAF on [facility] AUTHORITY: NSR/CVR on BB. On warfarin, Pharmacy to dose, appreciate input    10. IDDM: most recent Hgb A1c 10.2% in 2/19, repeat A1c down to 6.8%. Due to hypoglycemia, reduced Lantus from 10units to 8 units subcu daily,reduced SSI from medium to low dose on 11/14 . On lantus 30 units HS. Lispro TID w meals held on 11/14 ( per RN, pt is not eating that much). Blood glucose trend acceptable. Cont current regimen. cont ACCU and hypoglycemia protocol. 11. Essential HTN, uncontrolled:  Cont lisinopril, coreg and amlodipine. Adjust meds prn. VS per protocol   12. Morbid Obesity: Body mass index is 43.59 kg/m². Dispo: appreciate Neurology recs. Chief Complaint: [address] course:    Per H/P note:    \"Due to patient's mentation and history of CVA with residual aphasia. History obtained by chart review and nursing.     This is a chronically ill 70-year-old female who presented to [facility] emergency department on 11/10 due to altered mental status from the nursing home. Nursing home called EMS around 10 AM.  Patient told ED provider that she was not sure why she was at the emergency department but stated \"she is feeling worse than usual.\"  Patient denies any pain at this time. Patient denies any alleviating or aggravating factors. Patient was from Covid unit at her nursing home.     Hospitalist will admit the patient for further work-up. \"    Subjective (past 24 hours): resting. Arouses easily. Alert to name only. Denies pain.              Medications:  Reviewed    Infusion Medications    dextrose       Scheduled Medications    vitamin D  50,000 Units Oral Weekly    insulin lispro  0-6 Units Subcutaneous TID WC    insulin lispro  0-3 Units Subcutaneous Nightly    insulin glargine  8 Units Subcutaneous Daily    dexamethasone  6 mg Intravenous Q24H    remdesivir IVPB  100 mg Intravenous Q24H    calcium replacement protocol   Other RX Placeholder    calcium carbonate  500 mg Oral Once per day on Mon Wed Fri    carvedilol  6.25 mg Oral BID    citalopram  10 mg Oral Daily    docusate sodium  100 mg Oral Daily    atorvastatin  20 mg Oral Daily    oxybutynin  1 tablet Oral Daily    pantoprazole  40 mg Oral Daily    ferrous sulfate  325 mg Oral Daily    vitamin C  500 mg Oral Daily    zinc sulfate  50 mg Oral Daily    warfarin (COUMADIN) daily dosing (placeholder)   Other RX Placeholder    amLODIPine  10 mg Oral Daily    lisinopril  40 mg Oral Daily    insulin glargine  30 Units Subcutaneous Nightly    [Held by provider] insulin lispro  4 Units Subcutaneous TID WC    latanoprost  1 drop Both Eyes Nightly     PRN Meds: sodium chloride flush, glucose, dextrose, glucagon (rDNA), dextrose, acetaminophen **OR** acetaminophen      Intake/Output Summary (Last 24 hours) at 11/16/2020 1108  Last data filed at 11/15/2020 1937  Gross per 24 hour   Intake 300 ml   Output 950 ml   Net -650 ml       Diet:  DIET DYSPHAGIA MINCED AND MOIST; No Drinking Straw    Exam:  BP (!) 182/77   Pulse (!) 44   Temp 98 °F (36.7 °C) (Oral)   Resp 16   Ht 5' 2\" (1.575 m)   Wt 238 lb 5.1 oz (108.1 kg)   SpO2 94%   BMI 43.59 kg/m²   General appearance: No apparent distress, appears stated age and cooperative. HEENT: Pupils equal, round, and reactive to light. Conjunctivae clear. Neck: Supple, with full range of motion. No jugular venous distention. Trachea midline. Respiratory: On 1 lpm O2 via NC. No increased workload of breathing. Diminished air entry   Cardiovascular: normal rate, regular rhythm with normal S1/S2 without murmurs, rubs or gallops. Abdomen: Soft, obese, non-tender, non-distended with normal bowel sounds.   Musculoskeletal: passive and active ROM x 4 extremities. Skin: Skin color, texture, turgor normal.  No rashes or lesions. Neurologic:  alert, oriented to self only, normal speech, no focal findings or movement disorder noted. Right UE 0/5 ( chronic), RLE: 3/5  Exam of extremities: peripheral pulses normal, no pedal edema, trace pitting edema on both distal legs, no clubbing or cyanosis    Labs:   Recent Labs     11/14/20  0640 11/15/20  0648 11/16/20  0836   WBC 7.9 5.2 7.5   HGB 11.1* 11.4* 11.2*   HCT 35.9* 36.5* 35.8*    235 276     Recent Labs     11/14/20  0640 11/15/20  0648 11/16/20  0837    143 142   K 3.5 4.3 4.3    102 104   CO2 28 25 29   BUN 24* 25* 28*   CREATININE 1.2 1.3* 1.2   CALCIUM 8.0* 8.5 8.7     Recent Labs     11/14/20  0640 11/15/20  0648 11/16/20  0837   AST 15 13 11   ALT 24 23 20   BILIDIR <0.2 <0.2 <0.2   BILITOT 0.3 0.2* 0.2*   ALKPHOS 65 75 67     Recent Labs     11/14/20  0640 11/15/20  0648 11/16/20  0837   INR 3.38* 2.41* 2.02*     No results for input(s): Andrea Oiler in the last 72 hours. Microbiology:    Blood culture #1:   Lab Results   Component Value Date    BC No growth-preliminary No growth  11/10/2020       Blood culture #2:No results found for: Rikki Ricci    Organism:  Lab Results   Component Value Date    ORG Escherichia coli 08/11/2016         Lab Results   Component Value Date    LABGRAM  04/13/2015     Rare segmented neutrophils observed. No organisms observed.          MRSA culture only:No results found for: [facility]    Urine culture:   Lab Results   Component Value Date    LABURIN [place] count: >100,000 CFU/mL 08/11/2016       Respiratory culture: No results found for: CULTRESP    Aerobic and Anaerobic :  No results found for: LABAERO  Lab Results   Component Value Date    LABANAE No growth-preliminary  No growth   04/13/2015       Urinalysis:      Lab Results   Component Value Date    NITRU NEGATIVE 11/10/2020    WBCUA 5-9 11/10/2020    WBCUA 2-4 11/29/2011    BACTERIA NONE

## 2020-11-17 ENCOUNTER — APPOINTMENT (OUTPATIENT)
Dept: INTERVENTIONAL RADIOLOGY/VASCULAR | Age: 80
DRG: 177 | End: 2020-11-17
Payer: MEDICARE

## 2020-11-17 ENCOUNTER — APPOINTMENT (OUTPATIENT)
Dept: MRI IMAGING | Age: 80
DRG: 177 | End: 2020-11-17
Payer: MEDICARE

## 2020-11-17 LAB
ALBUMIN SERPL-MCNC: 2.7 G/DL (ref 3.5–5.1)
ALP BLD-CCNC: 64 U/L (ref 38–126)
ALT SERPL-CCNC: 21 U/L (ref 11–66)
ANION GAP SERPL CALCULATED.3IONS-SCNC: 8 MEQ/L (ref 8–16)
AST SERPL-CCNC: 13 U/L (ref 5–40)
BASOPHILS # BLD: 0 %
BASOPHILS ABSOLUTE: 0 THOU/MM3 (ref 0–0.1)
BILIRUB SERPL-MCNC: 0.2 MG/DL (ref 0.3–1.2)
BILIRUBIN DIRECT: < 0.2 MG/DL (ref 0–0.3)
BUN BLDV-MCNC: 28 MG/DL (ref 7–22)
CALCIUM SERPL-MCNC: 8.4 MG/DL (ref 8.5–10.5)
CHLORIDE BLD-SCNC: 102 MEQ/L (ref 98–111)
CO2: 30 MEQ/L (ref 23–33)
CREAT SERPL-MCNC: 1.2 MG/DL (ref 0.4–1.2)
EOSINOPHIL # BLD: 0 %
EOSINOPHILS ABSOLUTE: 0 THOU/MM3 (ref 0–0.4)
ERYTHROCYTE [DISTWIDTH] IN BLOOD BY AUTOMATED COUNT: 15.4 % (ref 11.5–14.5)
ERYTHROCYTE [DISTWIDTH] IN BLOOD BY AUTOMATED COUNT: 46.5 FL (ref 35–45)
GFR SERPL CREATININE-BSD FRML MDRD: 43 ML/MIN/1.73M2
GLUCOSE BLD-MCNC: 128 MG/DL (ref 70–108)
GLUCOSE BLD-MCNC: 142 MG/DL (ref 70–108)
GLUCOSE BLD-MCNC: 155 MG/DL (ref 70–108)
GLUCOSE BLD-MCNC: 160 MG/DL (ref 70–108)
GLUCOSE BLD-MCNC: 164 MG/DL (ref 70–108)
GLUCOSE BLD-MCNC: 178 MG/DL (ref 70–108)
HCT VFR BLD CALC: 35 % (ref 37–47)
HEMOGLOBIN: 10.8 GM/DL (ref 12–16)
IMMATURE GRANS (ABS): 0.03 THOU/MM3 (ref 0–0.07)
IMMATURE GRANULOCYTES: 0.4 %
INR BLD: 2.16 (ref 0.85–1.13)
LYMPHOCYTES # BLD: 19.3 %
LYMPHOCYTES ABSOLUTE: 1.4 THOU/MM3 (ref 1–4.8)
MCH RBC QN AUTO: 25.7 PG (ref 26–33)
MCHC RBC AUTO-ENTMCNC: 30.9 GM/DL (ref 32.2–35.5)
MCV RBC AUTO: 83.1 FL (ref 81–99)
MONOCYTES # BLD: 2.3 %
MONOCYTES ABSOLUTE: 0.2 THOU/MM3 (ref 0.4–1.3)
NUCLEATED RED BLOOD CELLS: 0 /100 WBC
PLATELET # BLD: 284 THOU/MM3 (ref 130–400)
PMV BLD AUTO: 10.5 FL (ref 9.4–12.4)
POTASSIUM SERPL-SCNC: 4.3 MEQ/L (ref 3.5–5.2)
RBC # BLD: 4.21 MILL/MM3 (ref 4.2–5.4)
SEG NEUTROPHILS: 78 %
SEGMENTED NEUTROPHILS ABSOLUTE COUNT: 5.5 THOU/MM3 (ref 1.8–7.7)
SODIUM BLD-SCNC: 140 MEQ/L (ref 135–145)
TOTAL PROTEIN: 5.6 G/DL (ref 6.1–8)
WBC # BLD: 7 THOU/MM3 (ref 4.8–10.8)

## 2020-11-17 PROCEDURE — 2500000003 HC RX 250 WO HCPCS: Performed by: FAMILY MEDICINE

## 2020-11-17 PROCEDURE — 6370000000 HC RX 637 (ALT 250 FOR IP): Performed by: PHYSICIAN ASSISTANT

## 2020-11-17 PROCEDURE — 82248 BILIRUBIN DIRECT: CPT

## 2020-11-17 PROCEDURE — 2580000003 HC RX 258: Performed by: FAMILY MEDICINE

## 2020-11-17 PROCEDURE — 6370000000 HC RX 637 (ALT 250 FOR IP): Performed by: NURSE PRACTITIONER

## 2020-11-17 PROCEDURE — 6370000000 HC RX 637 (ALT 250 FOR IP): Performed by: FAMILY MEDICINE

## 2020-11-17 PROCEDURE — 99223 1ST HOSP IP/OBS HIGH 75: CPT | Performed by: INTERNAL MEDICINE

## 2020-11-17 PROCEDURE — 80053 COMPREHEN METABOLIC PANEL: CPT

## 2020-11-17 PROCEDURE — 1200000000 HC SEMI PRIVATE

## 2020-11-17 PROCEDURE — 99232 SBSQ HOSP IP/OBS MODERATE 35: CPT | Performed by: PSYCHIATRY & NEUROLOGY

## 2020-11-17 PROCEDURE — 94760 N-INVAS EAR/PLS OXIMETRY 1: CPT

## 2020-11-17 PROCEDURE — 70551 MRI BRAIN STEM W/O DYE: CPT

## 2020-11-17 PROCEDURE — 94669 MECHANICAL CHEST WALL OSCILL: CPT

## 2020-11-17 PROCEDURE — 92526 ORAL FUNCTION THERAPY: CPT

## 2020-11-17 PROCEDURE — 93880 EXTRACRANIAL BILAT STUDY: CPT

## 2020-11-17 PROCEDURE — 99232 SBSQ HOSP IP/OBS MODERATE 35: CPT | Performed by: NURSE PRACTITIONER

## 2020-11-17 PROCEDURE — 82948 REAGENT STRIP/BLOOD GLUCOSE: CPT

## 2020-11-17 PROCEDURE — 36415 COLL VENOUS BLD VENIPUNCTURE: CPT

## 2020-11-17 PROCEDURE — 2700000000 HC OXYGEN THERAPY PER DAY

## 2020-11-17 PROCEDURE — 6360000002 HC RX W HCPCS: Performed by: NURSE PRACTITIONER

## 2020-11-17 PROCEDURE — 85025 COMPLETE CBC W/AUTO DIFF WBC: CPT

## 2020-11-17 PROCEDURE — 6370000000 HC RX 637 (ALT 250 FOR IP)

## 2020-11-17 PROCEDURE — 85610 PROTHROMBIN TIME: CPT

## 2020-11-17 RX ORDER — DEXAMETHASONE 4 MG/1
6 TABLET ORAL DAILY
Status: DISCONTINUED | OUTPATIENT
Start: 2020-11-17 | End: 2020-11-19 | Stop reason: HOSPADM

## 2020-11-17 RX ORDER — HYDRALAZINE HYDROCHLORIDE 25 MG/1
25 TABLET, FILM COATED ORAL EVERY 8 HOURS PRN
Status: DISCONTINUED | OUTPATIENT
Start: 2020-11-17 | End: 2020-11-19 | Stop reason: HOSPADM

## 2020-11-17 RX ORDER — WARFARIN SODIUM 4 MG/1
4 TABLET ORAL
Status: COMPLETED | OUTPATIENT
Start: 2020-11-17 | End: 2020-11-17

## 2020-11-17 RX ADMIN — Medication 50 MG: at 09:36

## 2020-11-17 RX ADMIN — HYDRALAZINE HYDROCHLORIDE 25 MG: 25 TABLET, FILM COATED ORAL at 17:06

## 2020-11-17 RX ADMIN — PANTOPRAZOLE SODIUM 40 MG: 40 TABLET, DELAYED RELEASE ORAL at 09:36

## 2020-11-17 RX ADMIN — LISINOPRIL 40 MG: 40 TABLET ORAL at 09:36

## 2020-11-17 RX ADMIN — WARFARIN SODIUM 4 MG: 4 TABLET ORAL at 17:43

## 2020-11-17 RX ADMIN — AMLODIPINE BESYLATE 10 MG: 10 TABLET ORAL at 09:32

## 2020-11-17 RX ADMIN — DOCUSATE SODIUM 100 MG: 100 CAPSULE, LIQUID FILLED ORAL at 09:36

## 2020-11-17 RX ADMIN — INSULIN GLARGINE 30 UNITS: 100 INJECTION, SOLUTION SUBCUTANEOUS at 22:05

## 2020-11-17 RX ADMIN — INSULIN GLARGINE 8 UNITS: 100 INJECTION, SOLUTION SUBCUTANEOUS at 09:46

## 2020-11-17 RX ADMIN — LATANOPROST 1 DROP: 50 SOLUTION OPHTHALMIC at 22:06

## 2020-11-17 RX ADMIN — CITALOPRAM 10 MG: 20 TABLET, FILM COATED ORAL at 09:36

## 2020-11-17 RX ADMIN — FERROUS SULFATE TAB 325 MG (65 MG ELEMENTAL FE) 325 MG: 325 (65 FE) TAB at 09:36

## 2020-11-17 RX ADMIN — OXYCODONE HYDROCHLORIDE AND ACETAMINOPHEN 500 MG: 500 TABLET ORAL at 09:36

## 2020-11-17 RX ADMIN — OXYBUTYNIN CHLORIDE 10 MG: 10 TABLET, EXTENDED RELEASE ORAL at 09:36

## 2020-11-17 RX ADMIN — DEXAMETHASONE 6 MG: 4 TABLET ORAL at 12:24

## 2020-11-17 RX ADMIN — REMDESIVIR 100 MG: 100 INJECTION, POWDER, LYOPHILIZED, FOR SOLUTION INTRAVENOUS at 16:37

## 2020-11-17 RX ADMIN — ATORVASTATIN CALCIUM 20 MG: 20 TABLET, FILM COATED ORAL at 09:36

## 2020-11-17 NOTE — PROGRESS NOTES
NEUROLOGY INPATIENT PROGRESS NOTE    Ned Stout    MRN -  [id number]   Ronnie # - [de-identified]      - 1940    68 y.o. Subjective: The patient is seen as follow up for metabolic encephalopathy. The patient underwent MRI brain performed this morning that showed small to moderate sized acute infarction in the right mesial temporal lobe, large chronic infarction in the right MCA territory. There is no evidence of hemorrhage. Patient is alert at this time. Objective:   BP (!) 170/72   Pulse (!) 48   Temp 97.8 °F (36.6 °C) (Oral)   Resp 18   Ht 5' 2\" (1.575 m)   Wt 238 lb 5.1 oz (108.1 kg)   SpO2 94%   BMI 43.59 kg/m²       Intake/Output Summary (Last 24 hours) at 2020 1648  Last data filed at 2020 1358  Gross per 24 hour   Intake 20 ml   Output 300 ml   Net -280 ml       Physical Exam:  General:  Awake, she is laying in bed, she has a right spastic hemiparesis, she is following Simple commands. Her speech was dysarthric. Cleophus Bern HEENT: pink conjunctiva, unicteric sclera, moist oral mucosa. There is no neck lymphadenopathy. Neck: There is no carotid bruits. The Neck is supple. Neuro: CN 2-12 right facial asymmetry. Power there is spastic right hemiparesis, Reflexes are plantar upgoing on the right. Long tracts are decreased. Cerebellar exam is Intact. Sensory exam is intact to light touch. Gait is not tested. No abnormal movement. Osteo: There is no LROM of any of the 4 extremity joints, no joint tenderness. Leg edema +1  Skin: no lesions, no rash, warm and moist to touch. Abdomen is soft intact bowel sounds. ROS:    Cardiac: no chest pain. No palpitations.   Renal : no flank pain, no hematuria  Skin: no rash    Medications:     warfarin  4 mg Oral Once    dexamethasone  6 mg Oral Daily    cloNIDine  0.1 mg Oral Once    vitamin D  50,000 Units Oral Weekly    insulin lispro  0-6 Units Subcutaneous TID     insulin lispro  0-3 Units Subcutaneous Nightly    insulin glargine  8 Units Subcutaneous Daily    remdesivir IVPB  100 mg Intravenous Q24H    calcium replacement protocol   Other RX Placeholder    calcium carbonate  500 mg Oral Once per day on Mon Wed Fri    carvedilol  6.25 mg Oral BID    citalopram  10 mg Oral Daily    docusate sodium  100 mg Oral Daily    atorvastatin  20 mg Oral Daily    oxybutynin  1 tablet Oral Daily    pantoprazole  40 mg Oral Daily    ferrous sulfate  325 mg Oral Daily    vitamin C  500 mg Oral Daily    zinc sulfate  50 mg Oral Daily    warfarin (COUMADIN) daily dosing (placeholder)   Other RX Placeholder    amLODIPine  10 mg Oral Daily    lisinopril  40 mg Oral Daily    insulin glargine  30 Units Subcutaneous Nightly    [Held by provider] insulin lispro  4 Units Subcutaneous TID WC    latanoprost  1 drop Both Eyes Nightly       Data:   CBC:   Recent Labs     11/15/20  0648 11/16/20  0836 11/17/20  0345   WBC 5.2 7.5 7.0   HGB 11.4* 11.2* 10.8*    276 284     BMP:    Recent Labs     11/15/20  0648 11/16/20  0837 11/17/20  0345    142 140   K 4.3 4.3 4.3    104 102   CO2 25 29 30   BUN 25* 28* 28*   CREATININE 1.3* 1.2 1.2   GLUCOSE 188* 140* 178*     Reviewed. Results for orders placed during the hospital encounter of 11/10/20   MRI BRAIN WO CONTRAST    Narrative PROCEDURE: MRI BRAIN WO CONTRAST    INDICATION:  AMS. Covid positive. COMPARISON: CT head dated 11/10/2020 and MR brain dated 6/7/2010. TECHNIQUE: Multiplanar and multiple spin echo MRI images were obtained of the brain without contrast.    FINDINGS:  There is a large area of encephalomalacia in the left frontal lobe, stable compared to prior CT and new compared to prior MRI. There is a small to moderate-sized area of encephalomalacia in the right parietal lobe, stable compared to prior CT and new   compared to prior MRI. There is ex vacuo dilatation of the left lateral ventricle.  There is moderate volume loss. There is a small a moderate-sized area of restricted diffusion in the mesial temporal lobe on the right with associated T2/flair signal   prolongation. There are mild scattered focal areas of T2/FLAIR prolongation elsewhere in the periventricular, subcortical deep white matter without associated restricted diffusion. There are small focal areas of encephalomalacia in the bilateral   cerebellar hemispheres. No intra or extra-axial mass is identified. The major vascular flow voids appear patent. Orbits are unremarkable. Paranasal sinuses are clear. There is a small left mastoid effusion. Impression    1. Small to moderate-sized acute infarct in the right mesial temporal lobe. 2. Redemonstration of large chronic infarct in the left MCA territory, smaller chronic infarct in the right MCA territory and old lacunar infarcts in the cerebellar hemispheres. Findings were discussed with Joo Silva RN via telephone at the time of interpretation. **This report has been created using voice recognition software. It may contain minor errors which are inherent in voice recognition technology. **    Final report electronically signed by Dr. Varsha Van MD on 11/17/2020 1:54 PM     No results found for this or any previous visit. No results found for this or any previous visit. Results for orders placed during the hospital encounter of 11/10/20   CT Head WO Contrast    Narrative PROCEDURE: CT HEAD WO CONTRAST    CLINICAL INFORMATION: AMS. COVID 19 positive    COMPARISON: Head CT 6/1/2016. TECHNIQUE: Noncontrast 5 mm axial images were obtained through the brain. Sagittal and coronal reconstructions were obtained. All CT scans at this facility use dose modulation, iterative reconstruction, and/or weight-based dosing when appropriate to reduce radiation dose to as low as reasonably achievable. FINDINGS:        There is no hemorrhage.  There is stable moderate-sized old infarct in the left frontoparietal junction at the level the vertex. There is associated ex vacuo dilation of the body of the left lateral ventricle. There are no new areas of abnormal   attenuation. There is no hydrocephalus, midline shift or mass effect. There are vascular calcifications. The paranasal sinuses and mastoid air cells are normally aerated. There is no suspicious calvarial abnormality. Impression  No evidence of an acute process. No change from prior. **This report has been created using voice recognition software. It may contain minor errors which are inherent in voice recognition technology. **    Final report electronically signed by Dr. Marshall Gonzalez on 11/10/2020 1:22 PM      Conclusions      Summary   Technically difficult study due to poor acoustic windows. Left ventricular size and systolic function is normal. Ejection fraction   was estimated at 55-60%. LV wall thickness is within normal limits and   there are no obvious wall motion abnormalities. There is a mobile mass   localized to the septal wall measuring 2.6 x 1.5 cm in the left ventricle. This was noted in the prior echo (2016) but it is increased in size. 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 The right ventricular size was normal with normal systolic function and   wall thickness. Mild tricuspid regurgitation. Mild mitral regurgitation is present.    The findings will be discussed with the primary team.      Signature      ----------------------------------------------------------------   Electronically signed by Amberly Alas MD (Interpreting   physician) on 11/17/2020 at 12:02 PM   ----------------------------------------------------------------      Assessment:  Active Problems:    AMS (altered mental status)    Pneumonia due to COVID-19 virus    Acute respiratory failure with hypoxia (HCC)    Acute metabolic encephalopathy    Acute on chronic heart failure with preserved ejection fraction (HFpEF) ([address]    Dysphagia    Acute kidney injury superimposed on CKD (HCC)    Hypocalcemia    History of stroke with residual deficit    Paroxysmal atrial fibrillation (HCC)    Diabetes mellitus ([address]    Obesity (BMI 30-39. 9)    Right spastic hemiparesis (HCC)  Resolved Problems:    * No resolved hospital problems. *  This is a 61-year-old female with history of recent COVID-19 infection, history of prior stroke with resultant right spastic hemiparesis, diabetes, obesity, pneumonia who presents with altered mental status. MRI brain performed today shows small to moderate size right temporal lobe ischemic infarction. I reviewed the MRI brain and agree with interpretation. 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 The patient was evaluated by cardiology, no intervention suggested, due to co morbidities. The patient has known history of atrial fibrillation, on anticoagulation. she was recommended to continue on anticoagulation at this time as she is not a good candidate. Plan:    1. Lipid profile  2. Lipid-lowering medication with target LDL below 70  3. Carotid US. 4. 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   5. Continue anticoagulation  6. Physical therapy  7. Occupational Therapy  8. Speech therapy  9. Correct metabolic derangements treat infection  10. Supportive care  11. DVT prophylaxis  12.  Please call if any questions    James Dao MD, 11/17/2020 4:49 PM

## 2020-11-17 NOTE — PROGRESS NOTES
[facility]  INPATIENT SPEECH THERAPY  STRZ ONC MED 5K  DAILY NOTE    TIME   SLP Individual Minutes  Time In: 1500  Time Out: 6052  Minutes: 18  Timed Code Treatment Minutes: 0 Minutes       Date: 2020  Patient Name: Wenceslao Joshi      CSN: [id number]   : 1940  (68 y.o.)  Gender: female   Referring Physician: Ryland Espino MD  Diagnosis: AMS  Secondary Diagnosis: Dysphagia; cognitive-linguistic deficits   Precautions: Fall risk, aspiration precautions   Current Diet: Minced/moist and thin liquids   Swallowing Strategies: Avoidance of mixed consistencies, no straws, medications in applesauce, supervision with meals, CLOSE pulmonary monitoring, no straws  Date of Last MBS: Not Applicable    Pain:  No pain reported. Subjective:  [facility], approved tx session this date. Upon arrival into room, pt sleeping. Pt easily roused to name and was agreeable to participate in po trials of thin liquids, minced and moist diet, and soft and bite-size. [facility], reported pt recently returned from MRI which showed pt was \"currently experiencing a CVA\". Short-Term Goals:  SHORT TERM GOAL #1:  Goal 1: Pt will consume a minced/moist diet and thin liquids (restriction on mixed consistencies and straw) without overt s/s of aspiration to meet nutritional/hydration measures safely. INTERVENTIONS: Skilled dysphagia therapy via completion of PO trials. Pt with trials of cracker moistened in applesauce x5 and sips of thin liquids by cup x10. Pt demonstrations of slow mastication delayed AP transit, reduced bolus formation, and min oral residue which clears with utilization of lingual sweep and liquid wash. Pt appears to be tolerating modified textures with good success. Certainly cannot r/o premature pharyngeal entry without formal imaging. Intermittent slight audible swallow present.  Pt with no overt s/s of aspiration 9/10 trials with thin liquids via cup sips and 5/5 trials with minced and moist texture. Pt observed with cough immediately following cup sip of thin liquids 1/10 trials this date. Pt noted with increased cough with thins via teaspoon sips this date 2/3 trials; however, once ST returned to trials of cup sips, pt noted with improved labial seal surrounding cup, + bolus acceptance, timely swallow, and no overt s/s of aspiration. Recommendations for pt to resume minced/moist diet and thin liquids. Will continue to monitor pt need for potential instrumental assessment. SHORT TERM GOAL #2:  Goal 2: Pt will consume advanced solids with ST only without overt s/s of aspiration in 10/10 trials for potential dietary advancement to least restrictive PO diet. INTERVENTIONS: With advanced solids (tristan cracker) this date, pt observed with evidence of mildly reduced bolus control, extended mastication, delayed bolus formation, delayed ap transit, reduced bolus clearance, and min oral residue with 3/3 trials this date. Pt utilized lingual sweep, liquid wash, and puree bolus to clear residue this date. It is recommended pt resumes with mince/moist diet and thin liquids this date. SHORT TERM GOAL #3:  Goal 3: Monitor pulmonary status and pt tolerance of PO diet with completion of FEES as clinically appropriate/indicated. INTERVENTIONS: See STG 1.     Long-Term Goals:   No established LTG's given short ELOS    EDUCATION:  Learner: Patient  Education:  Reviewed diet and strategies, Reviewed ST goals and Plan of Care and Reviewed recommendations for follow-up  Evaluation of Education: Demonstrates with assistance, Needs further instruction and Family not present    ASSESSMENT/PLAN:  Activity Tolerance:  Patient tolerance of  treatment: good. Assessment/Plan: Patient progressing toward established goals. Continues to require skilled care of licensed speech pathologist to progress toward achievement of established goals and plan of care. .     Plan for Next Session: skilled dietary analysis, advanced solids as appropriate, monitor need for [place] LIZBET Dye, [address]

## 2020-11-17 NOTE — PROGRESS NOTES
Yadiracinthya Montgomeryjessica 62  INPATIENT OCCUPATIONAL THERAPY  [facility] ONC MED 5K  EVALUATION    Time:   Time In: 696  Time Out: 1750  Timed Code Treatment Minutes: 23 Minutes  Minutes: 38          Date: 2020  Patient Name: Feli Sosa,   Gender: female      MRN: [id number]  : 1940  (68 y.o.)  Referring Practitioner: Dr. Dick Thurman MD  Diagnosis: Altered Mental Status  Additional Pertinent Hx: Pt presented to [facility] emergency department on 11/10 due to altered mental status from the nursing home. Nursing home called EMS around 10 AM.  Patient told ED provider that she was not sure why she was at the emergency department but stated \"she is feeling worse than usual.\"  Patient denies any pain at this time. Patient denies any alleviating or aggravating factors. Patient was from Covid unit at her nursing home. Restrictions/Precautions:  Restrictions/Precautions: Fall Risk, Contact Precautions  Position Activity Restriction  Other position/activity restrictions: Out of isolation     Subjective  Chart Reviewed: Yes, Orders, History and Physical, Other (comment)(PT evaluation)  Patient assessed for rehabilitation services?: Yes    Subjective: Pleasant and cooperative  Comments: RN approved session. Pt agreed to try sitting up at the edge of the bed for activity. She was assisted back into supine on her L side at the end of session. She did not indicate having any pain.     Pain:  Pain Assessment  Patient Currently in Pain: Denies    Social/Functional History:  Lives With: Alone  Type of Home: Facility([facility])  Home Layout: One level  Home Access: Level entry        Receives Help From: Other (comment)(staff as available)  ADL Assistance: Needs assistance  Homemaking Assistance: Needs assistance  Homemaking Responsibilities: No  Transfer Assistance: Needs assistance       Leisure & Hobbies: Watching dramas or entertainment shows on TV  Additional Comments: Pt was unable to state her functional level or need for assist secondary to dementia and expressive aphasia. Information not able to be found on chart. Cognition/Orientation:  Overall Orientation Status: Impaired  Orientation Level: Oriented to person, Disoriented to situation, Disoriented to time, Disoriented to place  Overall Cognitive Status: Exceptions  Cognition Comment: Answers questions Yes/No with 80% accuracy. She only said single words when wanting to talk. Cues needed for sequencing routine activities and for problem solving. ADL's:  Grooming: Moderate assistance(hair care while at the edge of bed- help needed for washing and drying off the hair while pt combed it only needing help with tangles occasionally)  Additional Comments: Pt had an external catheter placed at the start and at the end of the session. Vision - Basic Assessment  Prior Vision: Wears glasses only for reading    Functional Mobility:  Bed mobility  Rolling to Left: Moderate assistance(pt had help to initiate bringing her trunk and pelvis across to her Left)  Supine to Sit: Moderate assistance(using the bedrail to assist with help needed to bring her legs forward and to initiate her trunk coming upright)  Sit to Supine: Moderate assistance(bringing her feet up into the bed with cues to lower her trunk slowly)  Scooting: Moderate assistance    Functional Mobility  Functional Mobility Comments: Not able to demonstrate     Balance:  Balance  Sitting Balance: Stand by assistance(pt held onto the bedrail most of the time when having help with hair care; she could also assist and use her L hand only for handling the comb)  Standing Balance: Unable to assess(comment)  Standing Balance  Time: Not applicable    Transfers:  Sit to stand: Unable to assess  Stand to sit: Unable to assess  Transfer Comments: Pt did not stand at this time.        Upper Extremity Assessment:   LUE AROM : WFL  Left Hand AROM: WFL  RUE PROM: WFL  RUE General AROM: Limited flexon and horizontal abduction and external rotation. Right Hand General PROM: Limited finger and wrist extexion secondary to hypertonicity  Right Hand General AROM: Not able to demonstrate. LUE Strength  L Hand General: 4/5  LUE Strength Comment: 3+/5 deltoid; 3+/5 pectoral; 4/5 biceps/triceps  RUE Strength  R Hand General: NT  RUE Strength Comment: NT secondary to hypertonicity. Sensation  Overall Sensation Status: Impaired(unable to fully assess secondary to expressive aphasia)  Fine Motor Skills  Fine Motor Comment: Pt was able to use her L hand for self care. Unknown whether pt is L hand dominant. RUE Tone: Hyperreflexive  Coordination and Movement description: Right UE       Activity Tolerance: Patient limited by fatigue  Pt returned to being in supine after having sat up at the edge of bed for 14 minutes. She did indicate she was tired and ready to lay down after completing her hair care activity. Assessment:  Assessment: Patient would benefit from continued skilled OT services to address above deficits. She presents with altered mental status. She was living at a nursing home prior to admission. She had suffered a stroke and presents with R side increased tone and expressive aphasia. She is not able to provide any information about her prior level of functioning. Pt presents with memory deficits. Unknown if pt was understanding her health situation or how she expressed her needs prior to admission. She agreed to do her self care. She had help with self care. She needed MIN A for putting her hair in pigtails after she had combed it. She had help with washing and drying it. Pt did keep her balance at the edge of bed with SBA. She could tolerate sitting for up to 14 minutes.       Performance deficits / Impairments: Decreased functional mobility , Decreased ADL status, Decreased ROM, Decreased balance, Decreased endurance, Decreased cognition, Decreased safe awareness, Decreased coordination, Decreased fine motor control  Prognosis: Good  REQUIRES OT FOLLOW UP: Yes  Decision Making: High Complexity    Treatment Initiated: Treatment and education initiated within context of evaluation. Evaluation time included review of current medical information, gathering information related to past medical, social and functional history, completion of standardized testing, formal and informal observation of tasks, assessment of data and development of plan of care and goals. Treatment time included skilled education and facilitation of tasks to increase safety and independence with ADL's for improved functional independence and quality of life. Pt did tolerate exercises of her RUE: passive and also self ROM exercises in all planes and joints. She had tightness in pectoral and triceps muscles as well as her wrist and finger flexors. Some increased range noted at the end of the exercises. Tolerated well. Pt showed internal rotation at the shoulder when she returned to sidelying on the L side. A pillow was placed under her arm which she kept in extension at her side. Discharge Recommendations:  ECF with OT, Continue to assess pending progress, Patient would benefit from continued therapy after discharge    Patient Education:  OT Education: OT Role, Plan of Care, Orientation  Patient Education: Benefit of engaging in activities while sitting up to help her strength and get to be feeling better    Equipment Recommendations:  Equipment Needed: No    Plan:  Times per week: 3-5x  Current Treatment Recommendations: Self-Care / ADL, Functional Mobility Training, Endurance Training, Safety Education & Training, Balance Training, Strengthening, Cognitive Reorientation, Neuromuscular Re-education  Plan Comment: Pt would benefit from continued skilled OT services when medically stable and discharged from Acute. OT recommended while at [address]    Specific instructions for Next Treatment: Functional mobility; ADLs and sequencing; reorientation    Goals:  Patient goals : \"Get back to the [employer] and to be feel better. \" pt agrees. Short term goals  Time Frame for Short term goals: By discharge  Short term goal 1: Pt will complete upper body ADLs with MIN A and verbal cues for sequencing to increase her independence with self care. Short term goal 2: Pt will complete self ROM exercises with LUE assisting RUE with hands clasped and verbal/tactile cues as needed to decrease muscle tightness and protect her shoulder joint for ease of doing self care and functional transfers. Short term goal 3: Pt will demonstrate functional transfers with OTR to prepare for doing self care while out of bed. Short term goal 4: Pt will participate in dynamic reaching activities while in sitting position with SBA and verbal cues for safety to increase her independence and balance for doing her self care. See long-term goal time frame for expected duration of plan of care. If no long-term goals established, a short length of stay is anticipated. Following session, patient left in safe position with all fall risk precautions in place.

## 2020-11-17 NOTE — PROCEDURES
[address] [place] [address]                          ELECTROENCEPHALOGRAM REPORT    PATIENT NAME: KAYLIE HAIDER                   :        1940  MED REC NO:   [id number]                           ROOM:       0010  ACCOUNT NO:   [de-identified]                           ADMIT DATE: 11/10/2020  PROVIDER:     Irina Mcghee. Demetrice Tejeda MD    DATE OF EE2020    REFERRING PROVIDER:  Nhi Mejia CNP    CLINICAL HISTORY:  This is a 62-year-old female presenting with altered  mental status. She was at the [facility], recently treated  for Acquanetta Graham. Medications listed are vitamin D, insulin, Decadron,  remdesivir, carvedilol, citalopram, pantoprazole, vitamin C, zinc,  warfarin, amlodipine, lisinopril. This is a 17-channel EEG performed without sleep deprivation. Hyperventilation was not performed. Photic stimulation was not  performed. The patient is described as alert. The background rhythm activity is noted to be slowed, poorly organized. Excessive slowing was seen in theta and delta range activity suggestive  of cortical dysfunction such as seen with encephalopathy. No clinical  seizures were observed. There was no satisfactory background rhythm  reached throughout this recording. IMPRESSION:  This is an abnormal EEG due to the excessive slowing seen  in theta and delta range activity with failure to achieve a satisfactory  background rhythm suggestive of cortical dysfunction such as seen with  encephalopathy. However, there was no definitive evidence of  epileptiform activity appreciated.         Shilo Fletcher MD    D: 2020 8:48:25       T: 2020 8:54:10     [email or web address]  Job#: [id number]     Doc#: [id number]    CC:

## 2020-11-17 NOTE — PROGRESS NOTES
Comprehensive Nutrition Assessment    Type and Reason for Visit:  Initial(LOS)    Nutrition Recommendations/Plan:   1. Continue Dysphagia, Minced and Moist with No Straws per SLP. 2. Will start Magic Cups TID and Glucerna TID. 3. Monitor pt's weight due to fluid retention and inconsistent po intake  4. Will await decision about PEG tube. SLP is working with pt on solid foods per chart documentation. Nutrition Assessment:   Pt. nutritionally compromised AEB inconsistent po intake, dysphagia, and mental status changes. Pt At risk for further nutrition compromise r/t recent COVID, hx of CVA and residual effects (right side weakness), and underlying medical conditions (CKD, CKF, CAD, CVA, HTN, DM). Nutrition recommendations/interventions as per above. Malnutrition Assessment:  Malnutrition Status:  Insufficient data    Context:  Acute Illness     Findings of the 6 clinical characteristics of malnutrition:  Energy Intake:  1 - 75% or less of estimated energy requirements for 7 or more days  Weight Loss:  Unable to assess     Body Fat Loss:  Unable to assess     Muscle Mass Loss:  Unable to assess    Fluid Accumulation:  1 - Mild     Strength:  Not Performed    Estimated Daily Nutrient Needs:  Energy (kcal):  ~ 1500 kcal/day (30 kcal/kgm); Weight Used for Energy Requirements:  Ideal     Protein (g):  ~125 grams of protein daily (ELMER & CKD stage 2 - monitor labs); Weight Used for Protein Requirements:  Current(>2.5 grams of protein/ kgm IBW)          Nutrition Related Findings:  Did not see pt d/t droplet precautions; pt came from [facility]; + COVID on 11/2; mental status changes; hx of stroke with dysphagia/aphasia - right side affected; neuro consulted; pt working with SLP; inconsistent po intake per nursing; will send magic cup and Glucerna; Glucose levels: [phone]; A1C down to 6.8% from 10.2%, Albumin 2.7;  Rx includes Vitamin C, Vitamin D, Zinc, insulin, dexamethasone, calcium carbonate, iron

## 2020-11-17 NOTE — PROGRESS NOTES
Clinical Pharmacy Note    Warfarin consult follow-up    Recent Labs     11/17/20  0345   INR 2.16*     Recent Labs     11/15/20  0648 11/16/20  0836 11/17/20  0345   HGB 11.4* 11.2* 10.8*   HCT 36.5* 35.8* 35.0*    276 284       Significant Drug-Drug Interactions:  New warfarin drug-drug interactions: none  Discontinued drug-drug interactions: none  Current warfarin drug-drug interactions: citalopram, dexamethasone, vitamin C      Date INR Warfarin Dose   11/10/2020 2.46 4 mg   11/11/2020 2.59 4 mg   11/12/2020 2.84 4 mg   11/13/2020 3.03 3 mg   11/14/2020 3.38 0.5 mg   11/15/2020 2.41 4 mg   11/16/2020  2.02 4 mg   11/17/2020 2.16 4 mg            Notes:                     Daily PT/INR until stable within therapeutic range.        Kim Hester, PharmD   11/17/2020, 8:50 AM

## 2020-11-17 NOTE — PROGRESS NOTES
Received a phone call from primary RN, Sara Ying. 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 MRI does show a new CVA. Phone call made to patient's son, Julia Gamez. Discussed with Julia Gamez the goals of care for the patient. Discussed concerns regarding the echo results as it demonstrates an enlarging mass in the left ventricle. Julia Gamez states that he was aware of this mass and that this was the cause of the patient's previous CVA. Discussed workup/potential surgery would be warranted if/when patient's condition would allow. Discussed that a MRI was completed as there are concerns regarding a stroke. Discussed continued aggressive measures/interventions versus comfort care. Julia Gamez states that he would not want aggressive measures for the patient but would rather focus more on comfort and \"allow nature to take its course. \"  Discussed involvement of hospice to ascertain comfort needs of the patient and Julia Gamez is receptive to their involvement. Julia Gamez indicates that he is currently quarantined and is hopeful that his results tomorrow will allow him to come and visit his mother. Julia Gamez states, \"She's such a good mom. I just want her to be comfortable. \"  Much emotional support provided. Updated Suki Weems CNP. Updated Sara Ying RN. Please call palliative care if further needs arise.

## 2020-11-17 NOTE — PROGRESS NOTES
Hospitalist Progress Note      Patient:  Rose Padron    Unit/Bed:5K-10/010-A  YOB: 1940  MRN: [id number]   Acct: [de-identified]   PCP: No primary care provider on file. Date of Admission: 11/10/2020    Assessment/Plan:    1. COVID-19 pneumonia: resulted + on 11/2. Continue Vitamin C, Vitamin D, Zinc. Pt was taken off Remdesivir (no evidence on MAR that this was received) and Dexa, s/p 3 doses per hospitalist. Continue to require 1 lpm O2, remdesivir started 11/14, pharmacy consult to dose. Decadron 6 mg IV daily ( start date 11/14), change to PO. Pt was transferred out from covid-unit as it past 10 days from the time of test, since still symptomatic and actively treating place in Droplet plus precautions. 2. Acute hypoxic respiratory failure due to COVID-19 pneumonia versus CHF:  requiring 1 L NC, maintain oxygen saturation 93% and above. Wean as tolerated. Chest x-ray on 11/14 showed increased CHF, subsegmental atelectasis in left lower lobe. Lasix 40 mg IV x 1 given 11/14, 20 mg IV repeated 11/15. CXR 11/16 stable . 3. Acute metabolic encephalopathy: CT head on 11/10/2020 no acute process. Ammonia level normal.  UA on 11/10/2020 showed negative nitrite, small leukocyte esterase, WBC 5-9, negative blood. EEG with no noted seizure activity. Will obtain MRI. Neurology consulted, incomplete note for 11/15 in chair. Awaiting further recommendation. 4. Acute on chronic HFpEF, resolved: Last echo on file was in 2016, EF of 60%. Chest x-ray 11/14 showed CHF. Give 1 dose of Lasix 40 mg IV on 11/14 and additional 20 mg lasix IV given 11/15. Hold off on additional diuresis. Repeat echo was ordered several days ago, still not completed. Nursing to call and look into reason. 5. Bradycardia. Holding parameters to BB. 6. Dysphagia: seen by SLP, recommended minced/ moist and thin liquids  7.  ELMER on CKD stage II, improving: Baseline creatinine 0.8-1.1,  Creatinine 1.2 today. Likely prerenal. Improved with IVF, off IVF due to CHF. Lisinopril was held, resumed on 11/11.   8. Hypocalcemia likely due to hypoalbuminemia and vit D deficiency: ical 1.00, replete per protocol. vit D low and PTH normal. Start ergocalciferol, DC daily vit D. Recheck Vit D in 4 to 6 weeks in OP and f/u with PCP  9. 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 Stable at baseline  10. PAF on [address]: NSR/CVR on BB. On warfarin, Pharmacy to dose, appreciate input    11. IDDM: most recent Hgb A1c 10.2% in 2/19, repeat A1c down to 6.8%. Due to hypoglycemia, reduced Lantus from 10units to 8 units subcu daily,reduced SSI from medium to low dose on 11/14 . On lantus 30 units HS. Lispro TID w meals held on 11/14 ( per RN, pt is not eating that much). Blood glucose trend acceptable. Cont current regimen. cont ACCU and hypoglycemia protocol. 12. Accelerated HTN:  Holding Coreg due to bradycardia. Cont lisinopril and amlodipine. Given Clonidine x 1 thru the night with little response. Add Hydralazine PRN every 6 hours. 13. Morbid Obesity: Body mass index is 43.59 kg/m². Chief Complaint: [address] course:    Per H/P note:    \"Due to patient's mentation and history of CVA with residual aphasia. History obtained by chart review and nursing.     This is a chronically ill 56-year-old female who presented to [facility] emergency department on 11/10 due to altered mental status from the nursing home. Nursing home called EMS around 10 AM.  Patient told ED provider that she was not sure why she was at the emergency department but stated \"she is feeling worse than usual.\"  Patient denies any pain at this time. Patient denies any alleviating or aggravating factors. Patient was from Covid unit at her nursing home.     Hospitalist will admit the patient for further work-up. \"    Subjective (past 24 hours): Remains confused.  States \"yes\" when asked if she is having pain. When asked where she stated \" I don't know:. Has a dazed look during interview. Swallowing her pills with applesauce without difficulty.          Medications:  Reviewed    Infusion Medications    dextrose       Scheduled Medications    warfarin  4 mg Oral Once    cloNIDine  0.1 mg Oral Once    vitamin D  50,000 Units Oral Weekly    insulin lispro  0-6 Units Subcutaneous TID WC    insulin lispro  0-3 Units Subcutaneous Nightly    insulin glargine  8 Units Subcutaneous Daily    dexamethasone  6 mg Intravenous Q24H    remdesivir IVPB  100 mg Intravenous Q24H    calcium replacement protocol   Other RX Placeholder    calcium carbonate  500 mg Oral Once per day on Mon Wed Fri    carvedilol  6.25 mg Oral BID    citalopram  10 mg Oral Daily    docusate sodium  100 mg Oral Daily    atorvastatin  20 mg Oral Daily    oxybutynin  1 tablet Oral Daily    pantoprazole  40 mg Oral Daily    ferrous sulfate  325 mg Oral Daily    vitamin C  500 mg Oral Daily    zinc sulfate  50 mg Oral Daily    warfarin (COUMADIN) daily dosing (placeholder)   Other RX Placeholder    amLODIPine  10 mg Oral Daily    lisinopril  40 mg Oral Daily    insulin glargine  30 Units Subcutaneous Nightly    [Held by provider] insulin lispro  4 Units Subcutaneous TID     latanoprost  1 drop Both Eyes Nightly     PRN Meds: hydrALAZINE, sodium chloride flush, glucose, dextrose, glucagon (rDNA), dextrose, acetaminophen **OR** acetaminophen      Intake/Output Summary (Last 24 hours) at 11/17/2020 1127  Last data filed at 11/16/2020 1512  Gross per 24 hour   Intake 100 ml   Output 600 ml   Net -500 ml       Diet:  DIET DYSPHAGIA MINCED AND MOIST; No Drinking Straw  Dietary Nutrition Supplements: Frozen Oral Supplement  Dietary Nutrition Supplements: Diabetic Oral Supplement    Exam:  BP (!) 181/79   Pulse (!) 49   Temp 98 °F (36.7 °C) (Oral)   Resp 18   Ht 5' 2\" (1.575 m)   Wt 238 lb 5.1 oz (108.1 kg)   SpO2 98%   BMI 43.59 kg/m²      General appearance: No apparent distress, appears stated age and cooperative. HEENT: Pupils equal, round, and reactive to light. Conjunctivae clear. Neck: Supple, with full range of motion. No jugular venous distention. Trachea midline. Respiratory: On 1 lpm O2 via NC. No increased workload of breathing. Diminished air entry   Cardiovascular: normal rate, regular rhythm with normal S1/S2 without murmurs, rubs or gallops. Abdomen: Soft, obese, non-tender, non-distended with normal bowel sounds. Musculoskeletal: passive and active ROM x 4 extremities. Skin: Skin color, texture, turgor normal.  No rashes or lesions. Neurologic:  alert, oriented to self only, normal speech, no focal findings or movement disorder noted. Right UE 0/5 ( chronic), RLE: 3/5  Exam of extremities: peripheral pulses normal, no pedal edema, trace pitting edema on both distal legs, no clubbing or cyanosis    Labs:   Recent Labs     11/15/20  0648 11/16/20  0836 11/17/20  0345   WBC 5.2 7.5 7.0   HGB 11.4* 11.2* 10.8*   HCT 36.5* 35.8* 35.0*    276 284     Recent Labs     11/15/20  0648 11/16/20  0837 11/17/20  0345    142 140   K 4.3 4.3 4.3    104 102   CO2 25 29 30   BUN 25* 28* 28*   CREATININE 1.3* 1.2 1.2   CALCIUM 8.5 8.7 8.4*     Recent Labs     11/15/20  0648 11/16/20  0837 11/17/20  0345   AST 13 11 13   ALT 23 20 21   BILIDIR <0.2 <0.2 <0.2   BILITOT 0.2* 0.2* 0.2*   ALKPHOS 75 67 64     Recent Labs     11/15/20  0648 11/16/20  0837 11/17/20  0345   INR 2.41* 2.02* 2.16*     No results for input(s): Bernadine Berumen in the last 72 hours.     Microbiology:    Blood culture #1:   Lab Results   Component Value Date    BC No growth-preliminary No growth  11/10/2020       Blood culture #2:No results found for: Leonie Alpers    Organism:  Lab Results   Component Value Date    ORG Escherichia coli 08/11/2016         Lab Results   Component Value Date    LABGRAM  04/13/2015     Rare segmented neutrophils observed. No organisms observed. MRSA culture only:No results found for: [facility]    Urine culture:   Lab Results   Component Value Date    LABURIN Wall count: >100,000 CFU/mL 08/11/2016       Respiratory culture: No results found for: CULTRESP    Aerobic and Anaerobic :  No results found for: LABAERO  Lab Results   Component Value Date    LABANAE No growth-preliminary  No growth   04/13/2015       Urinalysis:      Lab Results   Component Value Date    NITRU NEGATIVE 11/10/2020    WBCUA 5-9 11/10/2020    WBCUA 2-4 11/29/2011    BACTERIA NONE SEEN 11/10/2020    RBCUA NONE SEEN 11/10/2020    BLOODU NEGATIVE 11/10/2020    SPECGRAV 1.030 09/06/2018    GLUCOSEU NEGATIVE 11/10/2020       Radiology:  XR CHEST PORTABLE   Final Result   Impression:   Stable exam.      This document has been electronically signed by: Jyotsna Mukherjee MD on    11/16/2020 03:32 AM         XR CHEST PORTABLE   Final Result   Impression:   Stable exam.      This document has been electronically signed by: Jyotsna Mukherjee MD on    11/15/2020 06:10 AM         XR CHEST PORTABLE   Final Result   Impression:   CHF, increased. Subsegmental atelectasis left lower lobe, new      This document has been electronically signed by: Clarita Garcia MD on    11/14/2020 01:45 AM         CT Head WO Contrast   Final Result    No evidence of an acute process. No change from prior. **This report has been created using voice recognition software. It may contain minor errors which are inherent in voice recognition technology. **      Final report electronically signed by Dr. Katie Brown on 11/10/2020 1:22 PM      XR CHEST PORTABLE   Final Result   Nodular opacities are noted left greater than right. **This report has been created using voice recognition software. It may contain minor errors which are inherent in voice recognition technology. **      Final report electronically signed by Dr. Marcelino Kidney on 11/10/2020 12:20 PM      MRI BRAIN WO CONTRAST    (Results Pending)     Ct Head Wo Contrast    Result Date: 11/10/2020  PROCEDURE: CT HEAD WO CONTRAST CLINICAL INFORMATION: AMS. COVID 19 positive COMPARISON: Head CT 6/1/2016. TECHNIQUE: Noncontrast 5 mm axial images were obtained through the brain. Sagittal and coronal reconstructions were obtained. All CT scans at this facility use dose modulation, iterative reconstruction, and/or weight-based dosing when appropriate to reduce radiation dose to as low as reasonably achievable. FINDINGS: There is no hemorrhage. There is stable moderate-sized old infarct in the left frontoparietal junction at the level the vertex. There is associated ex vacuo dilation of the body of the left lateral ventricle. There are no new areas of abnormal attenuation. There is no hydrocephalus, midline shift or mass effect. There are vascular calcifications. The paranasal sinuses and mastoid air cells are normally aerated. There is no suspicious calvarial abnormality. No evidence of an acute process. No change from prior. **This report has been created using voice recognition software. It may contain minor errors which are inherent in voice recognition technology. ** Final report electronically signed by Dr. Yogesh Toledo on 11/10/2020 1:22 PM    Xr Chest Portable    Result Date: 11/10/2020  PROCEDURE: XR CHEST PORTABLE CLINICAL INFORMATION: AMS. COMPARISON: Multiple previous most recent 5/3/2019 TECHNIQUE: AP upright view of the chest. FINDINGS: Mild cardiomegaly is similar to previous. There are small opacities inferiorly bilaterally. One of these is somewhat nodular on the left. Nodular infiltrate could cause this as could a lung nodule. Nodular opacities are noted left greater than right. **This report has been created using voice recognition software. It may contain minor errors which are inherent in voice recognition technology. ** Final report electronically signed by Dr. Sonia Melendez on 11/10/2020 12:20 PM      Electronically signed by AYAKA Mahoney CNP on 11/17/2020 at 11:27 AM

## 2020-11-17 NOTE — PLAN OF CARE
Discussed case with cardiology. Echo with a mobile mass in the LV. HX of such, now bigger around 2 CM  Will need MIKY, will place formal consult.      Alba Weems, APRN, CNP

## 2020-11-17 NOTE — CONSULTS
[facility]'s  Consult    Patient's Name/Date of Birth: Viri Been / 1940 (12 y.o.)    Date: November 17, 2020     Referring Provider: Kelsey Christine, *    CHIEF COMPLAINT: LV mass      HPI: This is a pleasant 68 y.o. female presents with hx of COVID 19 PNA, ELMER on CKD, hx of CVA, hx PAF on [facility], now with recurrent CVA on most recent MRI of the brain due to confused and altered mental status. TTE shows 2.0 cm mass in the LV. Limited code status. Patient was admitted with AMS from SNF directly from [facility]. She is on Remdesivir and steroids. She cannot give a meaningful history. She is able to swallow, she has her own teeth. She is on Norvasc, Coreg, Hydralazine. Echo:   Results for orders placed during the hospital encounter of 11/10/20   ECHO Complete 2D W Doppler W Color    Narrative Transthoracic Echocardiography Report (TTE)     Demographics      Patient Name  [facility] Gender             Female                 L      MR #          [id number]      Race                                                  Ethnicity      Account #     [de-identified]      Room Number        0010      Accession     [id number]     Date of Study      11/14/2020   Number      Date of Birth 1940     Referring          Hien Anne MD                                Physician      Age           68 year(s)     Sonographer        FEDERICO Beverly, WILL,                                                   RDMS, RVT                                   Interpreting       Marleen Curiel MD                                Physician     Procedure    Type of Study      TTE procedure:ECHOCARDIOGRAM COMPLETE 2D W DOPPLER W COLOR. Procedure Date  Date: 11/14/2020 Start: 10:06 AM    Study Location: Bedside  Technical Quality: Limited visualization due to body habitus. Indications:Evaluate left ventricular function.     Additional Medical History:atrial fibrillation, sepsis, urinary tract  infection, hypertension, osteoarthritis, gastroesophageal reflux disease,  cerebrovascular accident, myoxma of heart, pleural effusion, anemia, morbid  obesity, lower extremity edema    Patient Status: Routine    Height: 62 inches Weight: 238.01 pounds BSA: 2.06 m^2 BMI: 43.53 kg/m^2    BP: 148/67 mmHg     Conclusions      Summary   Technically difficult study due to poor acoustic windows. Left ventricular size and systolic function is normal. Ejection fraction   was estimated at 55-60%. LV wall thickness is within normal limits and   there are no obvious wall motion abnormalities. There is a mobile mass   localized to the septal wall measuring 2.6 x 1.5 cm in the left ventricle. This was noted in the prior echo (2016) but it is increased in size. 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 The right ventricular size was normal with normal systolic function and   wall thickness. Mild tricuspid regurgitation. Mild mitral regurgitation is present. The findings will be discussed with the primary team.      Signature      ----------------------------------------------------------------   Electronically signed by Marleen Curiel MD (Interpreting   physician) on 11/17/2020 at 12:02 PM   ----------------------------------------------------------------      Findings      Mitral Valve   The mitral valve structure is normal with normal leaflet separation. DOPPLER: The transmitral velocity was within the normal range with no   evidence for mitral stenosis. Mild mitral regurgitation is present. Aortic Valve   The aortic valve was trileaflet with normal thickness and cuspal   separation. DOPPLER: Transaortic velocity was within the normal range with   no evidence of aortic stenosis. There was no evidence of aortic   regurgitation. Tricuspid Valve   The tricuspid valve structure was normal with normal leaflet separation. DOPPLER: There was no evidence of tricuspid stenosis.    Mild tricuspid regurgitation. Pulmonic Valve   The pulmonic valve leaflets exhibited normal thickness, no calcification,   and normal cuspal separation. DOPPLER: The transpulmonic velocity was   within the normal range with no evidence for regurgitation. Left Atrium   Left atrial size was normal.      Left Ventricle   Left ventricular size and systolic function is normal. Ejection fraction   was estimated at 55-60%. LV wall thickness is within normal limits and   there are no obvious wall motion abnormalities. There is a mobile mass   localized to the septal wall 2.6 x 1.5 cm in the LV. This was noted in the   prior echo in 2016 but it is increased in size. 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 Right Atrium   Right atrial size was normal.      Right Ventricle   The right ventricular size was normal with normal systolic function and   wall thickness. Pericardial Effusion   The pericardium was normal in appearance with no evidence of a pericardial   effusion. Pleural Effusion   No evidence of pleural effusion. Aorta / Great Vessels   -Aortic root dimension within normal limits. -IVC size is within normal limits with normal respiratory phasic changes.      M-Mode/2D Measurements & Calculations      LV Diastolic   LV Systolic Dimension:    AV Cusp Separation: 1.7 cmLA   Dimension: 4.3 3.1 cm                    Dimension: 4.1 cmAO Root   cm             LV Volume Diastolic: 43.4 Dimension: 3.2 cmLA Area: 16.2   LV FS:27.9 %   ml                        cm^2   LV PW          LV Volume Systolic: 70.2   Diastolic: 1.1 ml   cm             LV EDV/LV EDV Index: 83.1   Septum         ml/40 m^2LV ESV/LV ESV    RV Diastolic Dimension: 2.9 cm   Diastolic: 1.3 Index: 17.1 ml/18 m^2   cm             EF Calculated: 54.4 %     LA/Aorta: 1.28                                               LA volume/Index: 41.8 ml /20m^2     Doppler Measurements & Calculations      MV Peak E-Wave: 76.4 cm/s  AV Peak Velocity: 174  LVOT Peak Velocity: 101   MV Peak A-Wave: 119 cm/s   cm/s                   cm/s   MV E/A Ratio: 0.64         AV Peak Gradient:      LVOT Peak Gradient: 4   MV Peak Gradient: 2.33     12.11 mmHg             mmHg   mmHg                                                     TV Peak E-Wave: 54.1   MV Deceleration Time: 363                         cm/s   msec                                              TV Peak A-Wave: 54.8                              IVRT: 141 msec         cm/s      MV E' Septal Velocity: 6.2                        TV Peak Gradient: 1.17   cm/s                       AV DVI (Vmax):0.58     mmHg   MV A' Septal Velocity: 5.5                        TR Velocity:233 cm/s   cm/s                                              TR Gradient:21.72 mmHg   MV E' Lateral Velocity:                           PV Peak Velocity: 96.1   5.1 cm/s                                          cm/s   MV A' Lateral Velocity:                           PV Peak Gradient: 3.69   10.3 cm/s                                         mmHg   E/E' septal: 12.32   E/E' lateral: 14.98     [email or web address] [id number]        All labs, EKG's, diagnostic testing and images as well as cardiac cath, stress testing were reviewed during this encounter    Past Medical History:   Diagnosis Date    Abdominal pain 12/14/2014    [facility] superimposed on CKD [facility] [address]     Acute on chronic heart failure with preserved ejection fraction (HFpEF) ([facility])     [facility]     CAD (coronary artery disease)     Cerebrovascular disease     Depression     Hyperlipidemia     Hypertension     Nausea and vomiting 4/13/2015    Self-care deficit for dressing and grooming     Self-care deficit for medication administration 2010    Type II or unspecified type diabetes mellitus without mention of complication, not stated Subcutaneous TID  SHERRI Perrin   4 Units at 11/13/20 1924    latanoprost (XALATAN) 0.005 % ophthalmic solution 1 drop  1 drop Both Eyes Nightly Amilcar Barreto   1 drop at 11/16/20 2131     Prior to Admission medications    Medication Sig Start Date End Date Taking?  Authorizing Provider   acetaminophen (TYLENOL) 325 MG tablet Take 650 mg by mouth every 4 hours as needed for Pain 8/1/19  Yes Historical Provider, MD   atorvastatin (LIPITOR) 20 MG tablet Take 20 mg by mouth nightly 5/26/20  Yes Historical Provider, MD   insulin glargine (BASAGLAR KWIKPEN) 100 UNIT/ML injection pen Inject 10 Units into the skin every morning   Yes Historical Provider, MD   insulin glargine (BASAGLAR KWIKPEN) 100 UNIT/ML injection pen Inject 30 Units into the skin nightly   Yes Historical Provider, MD   calcium carbonate (TUMS) 500 MG chewable tablet Take 1 tablet by mouth three times a week Tues/Thurs/Sat for GERD 8/3/19  Yes Historical Provider, MD   warfarin (COUMADIN) 4 MG tablet Take 4 mg by mouth Five times weekly Mon-Fri 10/1/20  Yes Historical Provider, MD   ferrous sulfate (IRON 325) 325 (65 Fe) MG tablet Take 325 mg by mouth daily 8/2/19  Yes Historical Provider, MD   potassium chloride (KLOR-CON M20) 20 MEQ extended release tablet Take 20 mEq by mouth daily 10/19/20  Yes Historical Provider, MD   latanoprost (XALATAN) 0.005 % ophthalmic solution Place 1 drop into both eyes nightly 1/21/20  Yes Historical Provider, MD   metFORMIN (GLUCOPHAGE) 500 MG tablet Take 500 mg by mouth 2 times daily   Yes Historical Provider, MD   insulin aspart (NOVOLOG) 100 UNIT/ML injection vial Inject 4 Units into the skin 3 times daily (before meals) 10/19/20  Yes Historical Provider, MD   albuterol sulfate HFA (VENTOLIN HFA) 108 (90 Base) MCG/ACT inhaler Inhale 2 puffs into the lungs every 4 hours as needed for Shortness of Breath 11/4/20  Yes Historical Provider, MD   linagliptin (TRADJENTA) 5 MG tablet Take 5 mg by mouth daily Oral Weekly    insulin lispro  0-6 Units Subcutaneous TID WC    insulin lispro  0-3 Units Subcutaneous Nightly    insulin glargine  8 Units Subcutaneous Daily    remdesivir IVPB  100 mg Intravenous Q24H    calcium replacement protocol   Other RX Placeholder    calcium carbonate  500 mg Oral Once per day on Mon Wed Fri    carvedilol  6.25 mg Oral BID    citalopram  10 mg Oral Daily    docusate sodium  100 mg Oral Daily    atorvastatin  20 mg Oral Daily    oxybutynin  1 tablet Oral Daily    pantoprazole  40 mg Oral Daily    ferrous sulfate  325 mg Oral Daily    vitamin C  500 mg Oral Daily    zinc sulfate  50 mg Oral Daily    warfarin (COUMADIN) daily dosing (placeholder)   Other RX Placeholder    amLODIPine  10 mg Oral Daily    lisinopril  40 mg Oral Daily    insulin glargine  30 Units Subcutaneous Nightly    [Held by provider] insulin lispro  4 Units Subcutaneous TID WC    latanoprost  1 drop Both Eyes Nightly     Continuous Infusions:   dextrose       PRN Meds:.hydrALAZINE, sodium chloride flush, glucose, dextrose, glucagon (rDNA), dextrose, acetaminophen **OR** acetaminophen    Allergies   Allergen Reactions    Papaya Derivatives Hives    Pcn [Penicillins] Hives     Family History   Problem Relation Age of Onset    Cancer Mother         kidney     Social History     Socioeconomic History    Marital status:      Spouse name: Not on file    Number of children: 2    Years of education: Not on file    Highest education level: Not on file   Occupational History    Occupation: Housewife   Social Needs    Financial resource strain: Not on file    Food insecurity     Worry: Not on file     Inability: Not on file   [employer] needs     Medical: Not on file     Non-medical: Not on file   Tobacco Use    Smoking status: Never Smoker    Smokeless tobacco: Never Used   Substance and Sexual Activity    Alcohol use: No    Drug use: No    Sexual activity: Not Currently   Lifestyle  Physical activity     Days per week: Not on file     Minutes per session: Not on file    Stress: Not on file   Relationships    Social connections     Talks on phone: Not on file     Gets together: Not on file     Attends [employer] service: Not on file     Active member of club or organization: Not on file     Attends meetings of clubs or organizations: Not on file     Relationship status: Not on file    Intimate partner violence     Fear of current or ex partner: Not on file     Emotionally abused: Not on file     Physically abused: Not on file     Forced sexual activity: Not on file   Other Topics Concern    Not on file   Social History Narrative    Not on file     ROS:   Constitutional: Denies any recent wt change. Eyes:  Denies any blurring or double vision, no glaucoma  Ears/Nose/Mouth/Throat:  Denies any chronic sinus/rhinitis, bleeding gums  Cardiovascular:  As described above. Respiratory:  Denies any frequent cough, wheezing or coughing up blood  Genitourinary:  Denies difficulty with urination and kidney stones  Gastrointestinal:  Denies any chronic problems with abdominal pain, nausea, vomiting or diarrhea  Musculoskeletal:  Denies any joint pain, back pain, or difficulty walking  Integumentary:  Denies any rash  Neurological:  No numbness or tingling  Endocrine:  Denies any polydipsia. Hematologic/Lymphatic:  Denies any hemorrhage or lymphatic drainage problems.   Labs:  CBC:   Recent Labs     11/15/20  0648 11/16/20  0836 11/17/20  0345   WBC 5.2 7.5 7.0   HGB 11.4* 11.2* 10.8*   HCT 36.5* 35.8* 35.0*   MCV 82.6 83.6 83.1    276 284     BMP:   Recent Labs     11/15/20  0648 11/16/20  0837 11/17/20  0345    142 140   K 4.3 4.3 4.3    104 102   CO2 25 29 30   BUN 25* 28* 28*   CREATININE 1.3* 1.2 1.2     Accucheck Glucoses:   Recent Labs     11/16/20  1146 11/16/20  1627 11/16/20  2008 11/17/20  0807 11/17/20  1211   POCGLU 119* 110* 110* 142* 128*     Cardiac Enzymes: No results for input(s): CKTOTAL, CKMB, CKMBINDEX, TROPONINI in the last 72 hours. PT/INR:   Recent Labs     11/15/20  0648 11/16/20  0837 11/17/20  0345   INR 2.41* 2.02* 2.16*     APTT: No results for input(s): APTT in the last 72 hours. Liver Profile:  Lab Results   Component Value Date    AST 13 11/17/2020    ALT 21 11/17/2020    BILIDIR <0.2 11/17/2020    BILITOT 0.2 11/17/2020    ALKPHOS 64 11/17/2020     Lab Results   Component Value Date    CHOL 138 09/04/2018    HDL 34 09/04/2018    TRIG 201 09/04/2018     TSH:   Lab Results   Component Value Date    TSH 3.230 09/04/2018     UA:   Lab Results   Component Value Date    COLORU YELLOW 11/10/2020    PHUR 5.0 11/10/2020    LABCAST 4-8 C. GRAN 09/06/2018    LABCAST NONE SEEN 09/06/2018    WBCUA 5-9 11/10/2020    WBCUA 2-4 11/29/2011    RBCUA NONE SEEN 11/10/2020    MUCUS THREADS 12/14/2014    YEAST NONE SEEN 11/10/2020    BACTERIA NONE SEEN 11/10/2020    SPECGRAV 1.030 09/06/2018    LEUKOCYTESUR SMALL 11/10/2020    UROBILINOGEN 0.2 11/10/2020    BILIRUBINUR NEGATIVE 11/10/2020    BILIRUBINUR NEGATIVE 11/29/2011    BLOODU NEGATIVE 11/10/2020    GLUCOSEU NEGATIVE 11/10/2020    AMORPHOUS URATES 09/06/2018         Physical Exam:  Vitals:    11/17/20 1226   BP: (!) 170/72   Pulse: (!) 48   Resp: 18   Temp: 97.8 °F (36.6 °C)   SpO2: 94%        Intake/Output Summary (Last 24 hours) at 11/17/2020 1516  Last data filed at 11/17/2020 1358  Gross per 24 hour   Intake 20 ml   Output 300 ml   Net -280 ml      General: AMS, demented  Neck: Supple, no JVD, no carotid bruits  Heart: Regular rhythm normal S1 and S2, no rubs, murmurs or gallops  Lungs: clear to ascultation no rales, wheezes, or rhonchi  Abdomen: positive bowel sounds, soft, non-tender, non-distended, no bruits, no masses  Extremities:no clubbing, cyanosis or edema  Neurologic: AMS  Skin: No rashes    Assessment:  LV Mass  ~2.0 cm - the mass is located on the septal wall that does not appear infarcted and mass is freely mobile in the mid cavity; this speaks against thrombus, especially because she is on [address] and there is no stasis of blood flow in the LV; the possibility of cardiac tumor must be considered--?myxoma; the mass was seen in 2016  (1cm) as well but it is quite larger now than prior and she has been [address], again raising the possibility of tumor  Afib on [address]  Preserved EF  2010 - cardiac cath without obstruction  Cr 1.2  Hgb 10.8  AMS  Recurrent CVA on therapeutic INR    Plan:  1. Would need MIKY to further evaluate and then discussion regarding cardiac surgery, however, she is not a candidate in her current state   2. Continue [address] - efficacy is very limited in this setting but she has Afib so reasonable to continue  3. Consider palliative care  4. Further recommendations based on results and clinical course    Thank you for allowing us to participate in the care of this patient. Please do not hesitate to call us with questions.     Electronically signed by Rachel Zimmerman MD on 11/17/2020 at 3:16 PM    Interventional Cardiology - [facility]

## 2020-11-18 LAB
ALBUMIN SERPL-MCNC: 2.6 G/DL (ref 3.5–5.1)
ALP BLD-CCNC: 70 U/L (ref 38–126)
ALT SERPL-CCNC: 25 U/L (ref 11–66)
ANION GAP SERPL CALCULATED.3IONS-SCNC: 7 MEQ/L (ref 8–16)
AST SERPL-CCNC: 17 U/L (ref 5–40)
BASOPHILS # BLD: 0.1 %
BASOPHILS ABSOLUTE: 0 THOU/MM3 (ref 0–0.1)
BILIRUB SERPL-MCNC: 0.2 MG/DL (ref 0.3–1.2)
BUN BLDV-MCNC: 28 MG/DL (ref 7–22)
CALCIUM SERPL-MCNC: 8.6 MG/DL (ref 8.5–10.5)
CHLORIDE BLD-SCNC: 103 MEQ/L (ref 98–111)
CHOLESTEROL, FASTING: 93 MG/DL (ref 100–199)
CO2: 30 MEQ/L (ref 23–33)
CREAT SERPL-MCNC: 1.1 MG/DL (ref 0.4–1.2)
EOSINOPHIL # BLD: 0 %
EOSINOPHILS ABSOLUTE: 0 THOU/MM3 (ref 0–0.4)
ERYTHROCYTE [DISTWIDTH] IN BLOOD BY AUTOMATED COUNT: 15.1 % (ref 11.5–14.5)
ERYTHROCYTE [DISTWIDTH] IN BLOOD BY AUTOMATED COUNT: 45.9 FL (ref 35–45)
GFR SERPL CREATININE-BSD FRML MDRD: 48 ML/MIN/1.73M2
GLUCOSE BLD-MCNC: 101 MG/DL (ref 70–108)
GLUCOSE BLD-MCNC: 125 MG/DL (ref 70–108)
GLUCOSE BLD-MCNC: 195 MG/DL (ref 70–108)
GLUCOSE BLD-MCNC: 221 MG/DL (ref 70–108)
GLUCOSE BLD-MCNC: 288 MG/DL (ref 70–108)
HCT VFR BLD CALC: 37.8 % (ref 37–47)
HDLC SERPL-MCNC: 34 MG/DL
HEMOGLOBIN: 11.8 GM/DL (ref 12–16)
IMMATURE GRANS (ABS): 0.02 THOU/MM3 (ref 0–0.07)
IMMATURE GRANULOCYTES: 0.3 %
INR BLD: 2.36 (ref 0.85–1.13)
LDL CHOLESTEROL CALCULATED: 49 MG/DL
LYMPHOCYTES # BLD: 22.9 %
LYMPHOCYTES ABSOLUTE: 1.8 THOU/MM3 (ref 1–4.8)
MCH RBC QN AUTO: 26.1 PG (ref 26–33)
MCHC RBC AUTO-ENTMCNC: 31.2 GM/DL (ref 32.2–35.5)
MCV RBC AUTO: 83.6 FL (ref 81–99)
MONOCYTES # BLD: 6 %
MONOCYTES ABSOLUTE: 0.5 THOU/MM3 (ref 0.4–1.3)
NUCLEATED RED BLOOD CELLS: 0 /100 WBC
PLATELET # BLD: 287 THOU/MM3 (ref 130–400)
PMV BLD AUTO: 10.4 FL (ref 9.4–12.4)
POTASSIUM SERPL-SCNC: 4 MEQ/L (ref 3.5–5.2)
RBC # BLD: 4.52 MILL/MM3 (ref 4.2–5.4)
SEG NEUTROPHILS: 70.7 %
SEGMENTED NEUTROPHILS ABSOLUTE COUNT: 5.7 THOU/MM3 (ref 1.8–7.7)
SODIUM BLD-SCNC: 140 MEQ/L (ref 135–145)
TOTAL PROTEIN: 6 G/DL (ref 6.1–8)
TRIGLYCERIDE, FASTING: 50 MG/DL (ref 0–199)
WBC # BLD: 8 THOU/MM3 (ref 4.8–10.8)

## 2020-11-18 PROCEDURE — 36415 COLL VENOUS BLD VENIPUNCTURE: CPT

## 2020-11-18 PROCEDURE — 82948 REAGENT STRIP/BLOOD GLUCOSE: CPT

## 2020-11-18 PROCEDURE — 92526 ORAL FUNCTION THERAPY: CPT

## 2020-11-18 PROCEDURE — 2500000003 HC RX 250 WO HCPCS: Performed by: FAMILY MEDICINE

## 2020-11-18 PROCEDURE — 6370000000 HC RX 637 (ALT 250 FOR IP)

## 2020-11-18 PROCEDURE — 6360000002 HC RX W HCPCS: Performed by: NURSE PRACTITIONER

## 2020-11-18 PROCEDURE — 6370000000 HC RX 637 (ALT 250 FOR IP): Performed by: FAMILY MEDICINE

## 2020-11-18 PROCEDURE — 1200000000 HC SEMI PRIVATE

## 2020-11-18 PROCEDURE — 85610 PROTHROMBIN TIME: CPT

## 2020-11-18 PROCEDURE — 80061 LIPID PANEL: CPT

## 2020-11-18 PROCEDURE — 85025 COMPLETE CBC W/AUTO DIFF WBC: CPT

## 2020-11-18 PROCEDURE — 80053 COMPREHEN METABOLIC PANEL: CPT

## 2020-11-18 PROCEDURE — 99232 SBSQ HOSP IP/OBS MODERATE 35: CPT | Performed by: FAMILY MEDICINE

## 2020-11-18 PROCEDURE — 2580000003 HC RX 258: Performed by: FAMILY MEDICINE

## 2020-11-18 PROCEDURE — 6370000000 HC RX 637 (ALT 250 FOR IP): Performed by: PHYSICIAN ASSISTANT

## 2020-11-18 RX ORDER — WARFARIN SODIUM 4 MG/1
4 TABLET ORAL
Status: COMPLETED | OUTPATIENT
Start: 2020-11-18 | End: 2020-11-18

## 2020-11-18 RX ADMIN — DEXAMETHASONE 6 MG: 4 TABLET ORAL at 09:54

## 2020-11-18 RX ADMIN — OXYCODONE HYDROCHLORIDE AND ACETAMINOPHEN 500 MG: 500 TABLET ORAL at 09:55

## 2020-11-18 RX ADMIN — CITALOPRAM 10 MG: 20 TABLET, FILM COATED ORAL at 09:54

## 2020-11-18 RX ADMIN — CARVEDILOL 6.25 MG: 6.25 TABLET, FILM COATED ORAL at 09:53

## 2020-11-18 RX ADMIN — REMDESIVIR 100 MG: 100 INJECTION, POWDER, LYOPHILIZED, FOR SOLUTION INTRAVENOUS at 17:54

## 2020-11-18 RX ADMIN — WARFARIN SODIUM 4 MG: 4 TABLET ORAL at 17:51

## 2020-11-18 RX ADMIN — PANTOPRAZOLE SODIUM 40 MG: 40 TABLET, DELAYED RELEASE ORAL at 10:01

## 2020-11-18 RX ADMIN — LISINOPRIL 40 MG: 40 TABLET ORAL at 09:55

## 2020-11-18 RX ADMIN — ATORVASTATIN CALCIUM 20 MG: 20 TABLET, FILM COATED ORAL at 09:53

## 2020-11-18 RX ADMIN — Medication 50 MG: at 09:55

## 2020-11-18 RX ADMIN — AMLODIPINE BESYLATE 10 MG: 10 TABLET ORAL at 09:52

## 2020-11-18 RX ADMIN — OXYBUTYNIN CHLORIDE 10 MG: 10 TABLET, EXTENDED RELEASE ORAL at 09:55

## 2020-11-18 RX ADMIN — ANTACID TABLETS 500 MG: 500 TABLET, CHEWABLE ORAL at 17:52

## 2020-11-18 RX ADMIN — DOCUSATE SODIUM 100 MG: 100 CAPSULE, LIQUID FILLED ORAL at 10:01

## 2020-11-18 RX ADMIN — LATANOPROST 1 DROP: 50 SOLUTION OPHTHALMIC at 19:48

## 2020-11-18 RX ADMIN — FERROUS SULFATE TAB 325 MG (65 MG ELEMENTAL FE) 325 MG: 325 (65 FE) TAB at 09:55

## 2020-11-18 RX ADMIN — INSULIN GLARGINE 30 UNITS: 100 INJECTION, SOLUTION SUBCUTANEOUS at 19:54

## 2020-11-18 ASSESSMENT — PAIN SCALES - GENERAL
PAINLEVEL_OUTOF10: 0

## 2020-11-18 NOTE — PROGRESS NOTES
Hospice referral completed with son Danica Garzon in [facility]. Patient sitting up in bed with no s/s of distress or discomfort noted. Hospice concepts, philosophies, and services explained. Ahsley aware of services as he had for his father, last year. Son aware of patient health condition with MRI showing new CVA and enlarging mass in the left ventricle. Voiced that does not wish for further work up/aggressive measures and wishes for patient to return to [facility] with hospice care and focus of care on comfort. Case reviewed with Dr. Marcela Monreal and permission to admit to hospice service in [facility] given. Scripts suggestions and DNR-CC filled out with oxygen order for signature for discharge by provider if agreeable. Asked DANIEL West to set time for transport at 1000am on 11.19.2020. updated Dr. Edward Shea of plan of discharge at this time with scripts suggestions and DNR-CC filled out with oxygen order on chart for signature if agreeable.

## 2020-11-18 NOTE — PROGRESS NOTES
[facility]  INPATIENT SPEECH THERAPY  STRZ ONC MED 5K  DAILY NOTE    TIME   SLP Individual Minutes  Time In: 7536  Time Out: 6365  Minutes: 16  Timed Code Treatment Minutes: 0 Minutes       Date: 2020  Patient Name: Chavo Lemus      CSN: [id number]   : 1940  (68 y.o.)  Gender: female   Referring Physician: Vernon Bosch MD  Diagnosis: AMS  Secondary Diagnosis: Dysphagia; cognitive-linguistic deficits   Precautions: Fall risk, aspiration precautions   Current Diet: Minced/moist and thin liquids   Swallowing Strategies: Avoidance of mixed consistencies, no straws, medications in applesauce, supervision with meals, CLOSE pulmonary monitoring, no straws  Date of Last MBS: Not Applicable    Pain:  No pain reported. Subjective:  Patient seen upright in bed with JEISON Villalpando permission on this date. Pt was pleasant, minimally verbal, able to follow commands, and no family present on this date. Pt was receiving 1:1 assist for meals. Short-Term Goals:  SHORT TERM GOAL #1:  Goal 1: Pt will consume a minced/moist diet and thin liquids (restriction on mixed consistencies and straw) without overt s/s of aspiration to meet nutritional/hydration measures safely. INTERVENTIONS:   Completed skilled dietary analysis during breakfast on this date. The patient was alert, upright in bed, receiving 1:1 feed assist from JEISON Pinzon. Jeison freeman denied any overt difficulties with swallowing prior to ST arrival. ST took over dietary analysis with introductions of advanced solids, soft & bite size solids (large tristan cracker chunks) paired with puree/yogurt. The pt demonstrated EXCELLENT success on this date. Slightly prolonged mastication, while VERY functional. Pt also demonstrating good bolus control/formation, consistent oral clearance and good oral acceptance.  Pt complete 4 oz of yogurt, 4 oz of applesauce, 2 packs of tristan crackers, 2-3 tsp bites of cottage cheese, and >8 ox of thin liquids via straw. Pt complete dietary analysis with grossly functional pharyngeal phase of the swallow, demonstrated by no overt s/s of aspiration/penetration. Unable to rule out pharyngeal dysfunction at the bedside alone, although ST does NOT feel a instrumental assessment is warranted. Recommended diet upgrade to soft & bite size solids, thin liquids via straw, meds whole in puree, direct 1:1 assist for meals, and continued pulmonary monitoring. SHORT TERM GOAL #2:  Goal 2: Pt will consume advanced solids with ST only without overt s/s of aspiration in 10/10 trials for potential dietary advancement to least restrictive PO diet. INTERVENTIONS: Please refer to goal 1. SHORT TERM GOAL #3:  Goal 3: Monitor pulmonary status and pt tolerance of PO diet with completion of FEES as clinically appropriate/indicated. INTERVENTIONS: Please refer to goal 1. .     Long-Term Goals:   No established LTG's given short ELOS    EDUCATION:  Learner: Patient  Education:  Reviewed diet and strategies, Reviewed ST goals and Plan of Care and Reviewed recommendations for follow-up  Evaluation of Education: Demonstrates with assistance, Needs further instruction and Family not present    ASSESSMENT/PLAN:  Activity Tolerance:  Patient tolerance of  treatment: good. Assessment/Plan: Patient progressing toward established goals. Continues to require skilled care of licensed speech pathologist to progress toward achievement of established goals and plan of care. .     Plan for Next Session: skilled dietary analysis, advanced solids as appropriate     Rachel Lamb MA., CCC-SLP

## 2020-11-18 NOTE — PROGRESS NOTES
Voicemail left for Pema LSW regarding discharge back to [facility] with [facility]. 0900 Referral called to hospice.

## 2020-11-18 NOTE — PLAN OF CARE
[facility]  OCCUPATIONAL THERAPY MISSED TREATMENT NOTE  STRZ ONC MED 5K  5K-10/010-A      Date: 2020  Patient Name: Margret Henderson        CSN: [id number]   : 1940  (68 y.o.)  Gender: female   Referring Practitioner: Dr. Montrell Landis MD  Diagnosis: Altered Mental Status         REASON FOR MISSED TREATMENT: Pt transitioning to hospice on 20. Will discontinue OT orders at this time.

## 2020-11-18 NOTE — H&P
Hospitalist Progress Note    Patient:  Chad Capps      Unit/Bed:5K-10/010-A    YOB: 1940    MRN: [id number]       Acct: [de-identified]     PCP: No primary care provider on file. Date of Admission: 11/10/2020    Assessment/Plan:    1. COVID-19 Pneumonia: + on 11/2. Continue Vitamin C, Vitamin D, Zinc. Remdesivir IV (started 11/14). Decadron 6 mg PO daily (started 11/14). 1 L/min NC. Afebrile, RR 18. O2 saturation above 92%. Symptomatic, droplet plus precautions. 2. Acute hypoxic respiratory failure due to COVID-19 pneumonia versus CHF: CXR increased CHF w/ subsegmental atelectasis in LLL (11/14), CXR stable (11/16). Interventions Lasix 40 mg IV x 1 (11/14), 20 mg IV x 1 (11/15). 3. Acute Metabolic Encephalopathy: CT no acute process (11/10), UA WNL (11/10), EEG no noted seizure activity, suggestive of cortical dysfunction as seen in encephalopathy (11/16). MRI small to moderate size acute infarction in the right mesial temporal lobe, large chronic infarction in the right MCA territory, no hemorrhage (11/17)  4. 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 Hold diuresis therapy. 5. Bradycardia: HR 48-52 bpm. Coreg held. 6. Dysphagia: Seen by SLP, minced/moist and thin liquids. 7. ELMER on CKD stage II, improving: Baselin Cr 0.8-1.1 --> 1.2 (11/17) --> 1.1 (11/18)  8. Hypocalcemia: 8.4 (11/17) --> 8.6 (11/18). iCal 1.00, repleted per protocol. Ergocalciferol, DC daily vitamin D. Vitamin D low and PTH normal  9. Hx CVA with residual effects: Right spastic hemiparesis, aphasia  10. PAF on [facility]: NSR/CVR (11/10). Warfarin, dose 4mg, INR 2.36. Daily PT/INR until stable within therapeutic range per pharmacy. 11. IDDM: Hgb A1c 10.2% (2/10), repeat 6.8%. Lantus 10U-->8U subcutaneous daily, reduced SSI medium to low, lantus 30U HS (11/14). Blood glucose trend acceptable. Continue ACCU and hypoglycemia protocol  12.  Hypertension: Continue lisinopril and and active ROM x 4 extremities. Skin: Skin color, texture, turgor normal. No rashes or lesions. Neurologic: Alert, oriented to self only, normal speech, no focal findings or movement disorder noted. Right UE 0/5 ( chronic), RLE: 3/5  Capillary Refill: Brisk,< 3 seconds   Peripheral Pulses: +2 palpable, equal bilaterally       Labs:   Recent Labs     11/16/20  0836 11/17/20  0345 11/18/20  0432   WBC 7.5 7.0 8.0   HGB 11.2* 10.8* 11.8*   HCT 35.8* 35.0* 37.8    284 287     Recent Labs     11/16/20  0837 11/17/20  0345 11/18/20  0432    140 140   K 4.3 4.3 4.0    102 103   CO2 29 30 30   BUN 28* 28* 28*   CREATININE 1.2 1.2 1.1   CALCIUM 8.7 8.4* 8.6     Recent Labs     11/16/20  0837 11/17/20  0345 11/18/20  0432   AST 11 13 17   ALT 20 21 25   BILIDIR <0.2 <0.2  --    BILITOT 0.2* 0.2* 0.2*   ALKPHOS 67 64 70     Recent Labs     11/16/20  0837 11/17/20  0345 11/18/20  0432   INR 2.02* 2.16* 2.36*     No results for input(s): Joeline Reeks in the last 72 hours. No results for input(s): PROCAL in the last 72 hours. Microbiology:      Urinalysis:      Lab Results   Component Value Date    NITRU NEGATIVE 11/10/2020    WBCUA 5-9 11/10/2020    WBCUA 2-4 11/29/2011    BACTERIA NONE SEEN 11/10/2020    RBCUA NONE SEEN 11/10/2020    BLOODU NEGATIVE 11/10/2020    SPECGRAV 1.030 09/06/2018    GLUCOSEU NEGATIVE 11/10/2020       Radiology:  MRI BRAIN WO CONTRAST   Final Result       1. Small to moderate-sized acute infarct in the right mesial temporal lobe. 2. Redemonstration of large chronic infarct in the left MCA territory, smaller chronic infarct in the right MCA territory and old lacunar infarcts in the cerebellar hemispheres. Findings were discussed with Raman Dunn RN via telephone at the time of interpretation. **This report has been created using voice recognition software. It may contain minor errors which are inherent in voice recognition technology. **      Final report electronically signed by Dr. Bharathi Carl MD on 11/17/2020 1:54 PM      XR CHEST PORTABLE   Final Result   Impression:   Stable exam.      This document has been electronically signed by: Hailey Maria MD on    11/16/2020 03:32 AM         XR CHEST PORTABLE   Final Result   Impression:   Stable exam.      This document has been electronically signed by: Hailey Maria MD on    11/15/2020 06:10 AM         XR CHEST PORTABLE   Final Result   Impression:   CHF, increased. Subsegmental atelectasis left lower lobe, new      This document has been electronically signed by: Rosemary Alcantara MD on    11/14/2020 01:45 AM         CT Head WO Contrast   Final Result    No evidence of an acute process. No change from prior. **This report has been created using voice recognition software. It may contain minor errors which are inherent in voice recognition technology. **      Final report electronically signed by Dr. Joel Cunningham on 11/10/2020 1:22 PM      XR CHEST PORTABLE   Final Result   Nodular opacities are noted left greater than right. **This report has been created using voice recognition software. It may contain minor errors which are inherent in voice recognition technology. **      Final report electronically signed by Dr. Jefry Cornejo on 11/10/2020 12:20 PM      VL DUP CAROTID BILATERAL    (Results Pending)       DVT prophylaxis: [] Lovenox                                 [] SCDs                                 [] SQ Heparin                                 [] Encourage ambulation           [x] Already on Anticoagulation     Code Status: DNR-CC    Tele:   [x] yes             [] no    Active Hospital Problems    Diagnosis Date Noted    Right spastic hemiparesis ([facility] [G81.11] 11/15/2020    Pneumonia due to COVID-19 virus [U07.1, J12.89]     Acute respiratory failure with hypoxia ([facility] [J96.01]     Acute metabolic encephalopathy [Y80.27]     Acute on chronic heart failure with preserved ejection fraction (HFpEF) ([address] [I50.33]     Dysphagia [R13.10]     Acute kidney injury superimposed on CKD ([address] [N17.9, N18.9]     Hypocalcemia [E83.51]     History of stroke with residual deficit [I69.30]     Paroxysmal atrial fibrillation ([address] [I48.0]     Diabetes mellitus ([address] [E11.9]     Obesity (BMI 30-39. 9) [E66.9]     AMS (altered mental status) [R41.82] 11/10/2020       Electronically signed by Iraj Sood on 11/18/2020 at 8:45 AM

## 2020-11-18 NOTE — PROGRESS NOTES
Clinical Pharmacy Note    Warfarin consult follow-up    Recent Labs     11/18/20  0432   INR 2.36*     Recent Labs     11/16/20  0836 11/17/20  0345 11/18/20  0432   HGB 11.2* 10.8* 11.8*   HCT 35.8* 35.0* 37.8    284 287       Significant Drug-Drug Interactions:  New warfarin drug-drug interactions: none  Discontinued drug-drug interactions: none  Current warfarin drug-drug interactions: citalopram, dexamethasone, vitamin C      Date INR Warfarin Dose   11/10/2020 2.46 4 mg   11/11/2020 2.59 4 mg   11/12/2020 2.84 4 mg   11/13/2020 3.03 3 mg   11/14/2020 3.38 0.5 mg   11/15/2020 2.41 4 mg   11/16/2020  2.02 4 mg   11/17/2020 2.16 4 mg   11/18/2020   2.36   4 mg        Notes:                     Daily PT/INR until stable within therapeutic range.      Brandi Baldwin, PharmD   11/18/2020, 9:08 AM

## 2020-11-19 VITALS
SYSTOLIC BLOOD PRESSURE: 158 MMHG | RESPIRATION RATE: 16 BRPM | HEART RATE: 49 BPM | OXYGEN SATURATION: 94 % | BODY MASS INDEX: 43.86 KG/M2 | HEIGHT: 62 IN | DIASTOLIC BLOOD PRESSURE: 70 MMHG | WEIGHT: 238.32 LBS | TEMPERATURE: 98 F

## 2020-11-19 LAB
BASOPHILS # BLD: 0.1 %
BASOPHILS ABSOLUTE: 0 THOU/MM3 (ref 0–0.1)
EOSINOPHIL # BLD: 0 %
EOSINOPHILS ABSOLUTE: 0 THOU/MM3 (ref 0–0.4)
ERYTHROCYTE [DISTWIDTH] IN BLOOD BY AUTOMATED COUNT: 15.2 % (ref 11.5–14.5)
ERYTHROCYTE [DISTWIDTH] IN BLOOD BY AUTOMATED COUNT: 46.2 FL (ref 35–45)
GLUCOSE BLD-MCNC: 123 MG/DL (ref 70–108)
HCT VFR BLD CALC: 36.5 % (ref 37–47)
HEMOGLOBIN: 11.4 GM/DL (ref 12–16)
IMMATURE GRANS (ABS): 0.04 THOU/MM3 (ref 0–0.07)
IMMATURE GRANULOCYTES: 0.4 %
INR BLD: 2.62 (ref 0.85–1.13)
LYMPHOCYTES # BLD: 17 %
LYMPHOCYTES ABSOLUTE: 1.6 THOU/MM3 (ref 1–4.8)
MCH RBC QN AUTO: 26 PG (ref 26–33)
MCHC RBC AUTO-ENTMCNC: 31.2 GM/DL (ref 32.2–35.5)
MCV RBC AUTO: 83.3 FL (ref 81–99)
MONOCYTES # BLD: 4.1 %
MONOCYTES ABSOLUTE: 0.4 THOU/MM3 (ref 0.4–1.3)
NUCLEATED RED BLOOD CELLS: 0 /100 WBC
PLATELET # BLD: 303 THOU/MM3 (ref 130–400)
PMV BLD AUTO: 10.5 FL (ref 9.4–12.4)
RBC # BLD: 4.38 MILL/MM3 (ref 4.2–5.4)
SEG NEUTROPHILS: 78.4 %
SEGMENTED NEUTROPHILS ABSOLUTE COUNT: 7.4 THOU/MM3 (ref 1.8–7.7)
WBC # BLD: 9.5 THOU/MM3 (ref 4.8–10.8)

## 2020-11-19 PROCEDURE — 6370000000 HC RX 637 (ALT 250 FOR IP): Performed by: FAMILY MEDICINE

## 2020-11-19 PROCEDURE — 85610 PROTHROMBIN TIME: CPT

## 2020-11-19 PROCEDURE — 82948 REAGENT STRIP/BLOOD GLUCOSE: CPT

## 2020-11-19 PROCEDURE — 99239 HOSP IP/OBS DSCHRG MGMT >30: CPT | Performed by: NURSE PRACTITIONER

## 2020-11-19 PROCEDURE — 85025 COMPLETE CBC W/AUTO DIFF WBC: CPT

## 2020-11-19 PROCEDURE — 6360000002 HC RX W HCPCS: Performed by: NURSE PRACTITIONER

## 2020-11-19 PROCEDURE — 36415 COLL VENOUS BLD VENIPUNCTURE: CPT

## 2020-11-19 PROCEDURE — 6370000000 HC RX 637 (ALT 250 FOR IP): Performed by: PHYSICIAN ASSISTANT

## 2020-11-19 RX ADMIN — PANTOPRAZOLE SODIUM 40 MG: 40 TABLET, DELAYED RELEASE ORAL at 09:08

## 2020-11-19 RX ADMIN — INSULIN GLARGINE 8 UNITS: 100 INJECTION, SOLUTION SUBCUTANEOUS at 09:11

## 2020-11-19 RX ADMIN — DEXAMETHASONE 6 MG: 4 TABLET ORAL at 09:08

## 2020-11-19 RX ADMIN — DOCUSATE SODIUM 100 MG: 100 CAPSULE, LIQUID FILLED ORAL at 09:08

## 2020-11-19 RX ADMIN — Medication 50 MG: at 09:07

## 2020-11-19 RX ADMIN — LISINOPRIL 40 MG: 40 TABLET ORAL at 09:07

## 2020-11-19 RX ADMIN — OXYBUTYNIN CHLORIDE 10 MG: 10 TABLET, EXTENDED RELEASE ORAL at 09:07

## 2020-11-19 RX ADMIN — ATORVASTATIN CALCIUM 20 MG: 20 TABLET, FILM COATED ORAL at 09:08

## 2020-11-19 RX ADMIN — FERROUS SULFATE TAB 325 MG (65 MG ELEMENTAL FE) 325 MG: 325 (65 FE) TAB at 09:07

## 2020-11-19 RX ADMIN — OXYCODONE HYDROCHLORIDE AND ACETAMINOPHEN 500 MG: 500 TABLET ORAL at 09:07

## 2020-11-19 RX ADMIN — CITALOPRAM 10 MG: 20 TABLET, FILM COATED ORAL at 09:07

## 2020-11-19 RX ADMIN — AMLODIPINE BESYLATE 10 MG: 10 TABLET ORAL at 09:08

## 2020-11-19 NOTE — PROGRESS NOTES
Patient updated on plan to discharge back to [facility] at 0930. Patient assessed and morning medications completed. Last dose MAR printed and sent with transport to receiving facility. Patient assisted to transport stretcher, all belongings sent with patient. Report called to [employer], [facility] called and updated on patients departure.

## 2020-11-19 NOTE — CARE COORDINATION
11/12/20, 12:52 PM EST  Discharge Planning Evaluation  Social work consult received, patient from [facility]. Patient/Family preference is to return to [facility], message left for son regarding return. The patient's current payor source at the facility is Medicaid. Medicare skilled days available: yes  Insurance precert:  no  Spoke with Roxanne at the facility.   Patient bed hold: yes  Anticipated transport plan: ambulance/ambulette  Do they require COVID 19 test to return to F: no  Is there a required time frame which which COVID test needs done: n/a
11/13/20, 5:02 PM EST    DISCHARGE PLANNING EVALUATION    Spoke with Roxanne at [facility]-they will be able to take this weekend if patient is discharged. They will still need an additional COVID test. Spoke with son Mouna Crawford and confirmed plan for return to [facility] [address] when discharged. Directions left on chart for staff.
11/13/20, 7:29 AM EST    DISCHARGE BARRIERS        Patient transferred to . Report given to unit Adele Mcgraw, regarding discharge plan for this patient.
11/19/20, 9:10 AM EST    DISCHARGE PLANNING EVALUATION    Patient is to be discharged today to [address] with [address] to follow (hospice medicaid level of care). AVS and scripts faxed to [address] and to [address]. Yvan Prude at [place] point updated. Confirmed with [facility] that transport is still for 9:30 am-[facility] will transport. Number provided to RN [facility] for report. No other needs at this time. 11/19/20, 9:29 AM EST    Patient goals/plan/ treatment preferences discussed by  and . Patient goals/plan/ treatment preferences reviewed with patient/ family. Patient/ family verbalize understanding of discharge plan and are in agreement with goal/plan/treatment preferences. Understanding was demonstrated using the teach back method. AVS provided by RN at time of discharge, which includes all necessary medical information pertaining to the patients current course of illness, treatment, post-discharge goals of care, and treatment preferences.     Services After Discharge  Services At/After Discharge: Nursing Services, In ambulance, Aide Services, Hospice, Transport([facility])   IMM Letter  IMM Letter given to Patient/Family/Significant other/Guardian/POA/by[de-identified] CM  IMM Letter date given[de-identified] 11/19/20  IMM Letter time given[de-identified] 0820
DISASTER CHARTING    11/12/20, 4:39 PM EST    DISCHARGE ONGOING EVALUATION:     [address] day: 2  Location: [facility]26/026-A Reason for admit: AMS (altered mental status) [R41.82]   Barriers to Discharge: Pt can be moved out of isolation off Covid unit. Speech Therapy seeing pt. No fever. O2 at 1L/NC. 95% saturation. Creat 1.4 (baseline0. 9)  PCP: Maria Elena Bajwa, DO  Patient Goals/Plan/Treatment Preferences: Plans return to [facility]. SW following.
DISASTER CHARTING    11/18/20, 12:23 PM EST    DISCHARGE ONGOING EVALUATION:     [address] day: 8  Location: [facility]10/010-A Reason for admit: AMS (altered mental status) [R41.82]   Barriers to Discharge: Discharge planned for tomorrow, return to [facility] with [facility] to sign on. PCP: No primary care provider on file. Patient Goals/Plan/Treatment Preferences: Discharge tomorrow with Hospice.
Plan:  To ER with ALS. From ECF and Covid unit there. Alert to person and place. Aphasia from prior CVA. Hx A-fib. Creat 1.5. IVF at 50/hr. Dexamethasone and Vits. O2 2L/NC. Afebrile. PT/OT ordered. Diet: No diet orders on file   Smoking status:  reports that she has never smoked. She has never used smokeless tobacco.   PCP: Candy Willis DO  Readmission 30 days or less: No  Readmission Risk Score: 26%    Discharge Planning Evaluation  Current Residence:     Living Arrangements:      Support Systems:     Current Services PTA:     Potential Assistance Needed:     Potential Assistance Purchasing Medications:     Does patient want to participate in local refill/ meds to beds program?     Type of Home Care Services:     Patient expects to be discharged to:     Expected Discharge date: Follow Up Appointment: Best Day/ Time:      Patient Goals/Plan/Treatment Preferences: Visited pt this morning. Appears difficult for pt to speak although she acknowledges that she comes from [facility] and plans to return there at discharge. SW is following for continuity of services. Transportation/Food Security/Housekeeping Addressed:  No issues identified.     Evaluation: yes

## 2020-12-02 NOTE — DISCHARGE SUMMARY
Hospitalist Discharge Summary        Patient: Val Arana  YOB: 1940  MRN: [id number]   Acct: [de-identified]    Primary Care Physician: No primary care provider on file. Admit date  11/10/2020    Discharge date:  12/1/2020 10:11 PM  Discharge Diagnoses: Active Hospital Problems    Diagnosis Date Noted    Right spastic hemiparesis ([facility] [G81.11] 11/15/2020    Pneumonia due to COVID-19 virus [U07.1, J12.89]     Acute respiratory failure with hypoxia ([facility]) [J96.01]     Acute metabolic encephalopathy [E40.59]     Acute on chronic heart failure with preserved ejection fraction (HFpEF) ([facility]) [I50.33]     Dysphagia [R13.10]     Acute kidney injury superimposed on CKD ([facility] [N17.9, N18.9]     Hypocalcemia [E83.51]     History of stroke with residual deficit [I69.30]     Paroxysmal atrial fibrillation ([facility] [I48.0]     Diabetes mellitus ([facility] [E11.9]     Obesity (BMI 30-39. 9) [E66.9]     AMS (altered mental status) [R41.82] 11/10/2020    Hospice care [Z51.5] 05/30/2013       Code Status:  Prior [facility]    Chief Complaint on presentation :-  AMS      Initial admission HPI as below:-  Due to patient's mentation and history of CVA with residual aphasia. History obtained by chart review and nursing.     This is a chronically ill 79-year-old female who presented to [facility] emergency department on 11/10 due to altered mental status from the nursing home.  Nursing home called EMS around [address] told ED provider that she was not sure why she was at the emergency department but stated \"she is feeling worse than usual.\"  Patient denies any pain at this time. Tiffany King denies any alleviating or aggravating factors.  Patient was from Covid unit at her nursing home.     Hospitalist will admit the patient for further work-up    Hospital problem list with assessment and plan updates:-  1. COVID-19 Pneumonia: + on 11/2.  Continue Vitamin C, Vitamin D, Zinc. Remdesivir IV (started 11/14). Decadron 6 mg PO daily (started 11/14). 1 L/min NC. Afebrile, RR 18. O2 saturation above 92%. Symptomatic, droplet plus precautions. 2. Acute hypoxic respiratory failure due to COVID-19 pneumonia versus CHF: CXR increased CHF w/ subsegmental atelectasis in LLL (11/14), CXR stable (11/16). Interventions Lasix 40 mg IV x 1 (11/14), 20 mg IV x 1 (11/15). 3. Acute Metabolic Encephalopathy: CT no acute process (11/10), UA WNL (11/10), EEG no noted seizure activity, suggestive of cortical dysfunction as seen in encephalopathy (11/16). MRI small to moderate size acute infarction in the right mesial temporal lobe, large chronic infarction in the right MCA territory, no hemorrhage (11/17)  4. 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 Hold diuresis therapy. 5. Bradycardia: HR 48-52 bpm. Coreg held. 6. Dysphagia: Seen by SLP, minced/moist and thin liquids. 7. ELMER on CKD stage II, improving: Baselin Cr 0.8-1.1 --> 1.2 (11/17) --> 1.1 (11/18)  8. Hypocalcemia: 8.4 (11/17) --> 8.6 (11/18). iCal 1.00, repleted per protocol. Ergocalciferol, DC daily vitamin D. Vitamin D low and PTH normal  9. Hx CVA with residual effects: Right spastic hemiparesis, aphasia  10. PAF on [address]: NSR/CVR (11/10). Warfarin, dose 4mg, INR 2.36. Daily PT/INR until stable within therapeutic range per pharmacy. 11. IDDM: Hgb A1c 10.2% (2/10), repeat 6.8%. Lantus 10U-->8U subcutaneous daily, reduced SSI medium to low, lantus 30U HS (11/14). Blood glucose trend acceptable. Continue ACCU and hypoglycemia protocol  12. Hypertension: Continue lisinopril and amlodipine. Hold Coreg for bradycardia. Clonidine x1 (11/17), little response. Hydralazine PO x 1 (11/17), PRN every 8 hr. BP systolic 661Z, diastolic 29A. Most recent 147/66. 13. Morbid Obesity: BMI 43.59 kg/m2. 14. Palliative care: Palliative care consult, spoke to son Dominique Ye. Discussed echo findings and new infarct on MRI.  Patient's son wants patient to be comfortable and does not want aggressive measures. DNR-CC. Discharge back to [facility]. Per palliative care note    Additional discharge final recommendations as below:-  Hospice care involved because of multiple comorbidities and mainly due to new CVA and enlarging mass in the left ventricle. The son had long discussion with Poly Mcghee RN hospice about future goals of care and was agreeable that the patient return to [facility] with hospice care and focus only on comfort care. I signed DNR CC orders in addition to other hospice orders and place them in the patient chart. Discharge Nurse note at the time of discharge as below:-  Patient updated on plan to discharge back to [facility] at 0930. Patient assessed and morning medications completed. Last dose MAR printed and sent with transport to receiving facility. Patient assisted to transport stretcher, all belongings sent with patient. Report called to [place],  Hospice called and updated on patients departure. Physical Exam:-  Vitals:   No data found. Weight:   Weight: 238 lb 5.1 oz (108.1 kg)   24 hour intake/output:   No intake or output data in the 24 hours ending 12/01/20 2211    General appearance: No apparent distress, appears stated age and cooperative. HEENT: Pupils equal, round, and reactive to light. Conjunctivae/corneas clear. Neck: Supple, with full range of motion. No jugular venous distention. Trachea midline. Respiratory: On 1 lpm O2 via NC. No increased workload of breathing. Diminished air entry. Cardiovascular: Bradycardia, and rhythm with normal S1/S2 without murmurs, rubs or gallops. Abdomen: Soft, non-tender, non-distended with normal bowel sounds. Musculoskeletal: passive and active ROM x 4 extremities. Skin: Skin color, texture, turgor normal. No rashes or lesions.   Neurologic: Alert, oriented to self only, normal speech, no focal findings or movement disorder noted. Right UE 0/5 ( chronic), RLE: 3/5  Capillary Refill: Brisk,< 3 seconds   Peripheral Pulses: +2 palpable, equal bilaterally     Discharge Medications:-      Medication List      ASK your doctor about these medications    acetaminophen 325 MG tablet  Commonly known as:  TYLENOL     amLODIPine-benazepril 10-40 MG per capsule  Commonly known as:  LOTREL     ascorbic acid 500 MG tablet  Commonly known as:  VITAMIN C     atorvastatin 20 MG tablet  Commonly known as:  LIPITOR     * Basaglar KwikPen 100 UNIT/ML injection pen  Generic drug:  insulin glargine  Ask about: Which instructions should I use? * Basaglar KwikPen 100 UNIT/ML injection pen  Generic drug:  insulin glargine  Ask about: Which instructions should I use?     blood glucose test strips strip  Commonly known as:  ONE TOUCH TEST STRIPS  Test blood sugar with One Touch Ultra 2 QID and as needed. calcium carbonate 500 MG chewable tablet  Commonly known as:  TUMS     carvedilol 6.25 MG tablet  Commonly known as:  Coreg  Take 1 tablet by mouth 2 times daily     citalopram 10 MG tablet  Commonly known as:  CELEXA  TAKE ONE TABLET BY MOUTH ONCE DAILY     dexamethasone 4 MG tablet  Commonly known as:  DECADRON  Ask about: Should I take this medication?     docusate sodium 100 MG capsule  Commonly known as:  COLACE     ferrous sulfate 325 (65 Fe) MG tablet  Commonly known as:  IRON 325  Ask about: Which instructions should I use?     furosemide 20 MG tablet  Commonly known as:  LASIX     GLUCAGEN HYPOKIT IJ     Klor-Con M20 20 MEQ extended release tablet  Generic drug:  potassium chloride     latanoprost 0.005 % ophthalmic solution  Commonly known as:  XALATAN     linagliptin 5 MG tablet  Commonly known as:  TRADJENTA     metFORMIN 500 MG tablet  Commonly known as:  GLUCOPHAGE  Ask about: Which instructions should I use?      NovoLOG 100 UNIT/ML injection vial  Generic drug:  insulin aspart     oxybutynin 10 MG extended release tablet  Commonly contain minor errors which are inherent in voice recognition technology. ** Final report electronically signed by Dr. Israel Cerna on 11/10/2020 12:20 PM       Follow-up scheduled after discharge :-    Under hospice care    Consultations during this hospital stay:-  [] NONE [x] Cardiology  [] Nephrology  [] Hemo onco   [] GI   [] ID  [] Endocrine  [] Pulm    [] Neuro    [] Psych   [] Urology  [] ENT   [] G SURGERY   []Ortho    []CV surg    [x] Palliative  [x] Hospice [] Pain management   [x]    []TCU   [x] PT/OT  OTHERS:- francoise    Disposition: Cary velásquez with [facility]  Condition at Discharge: Guarded    Discharge Medications:      Reina Peacock   Home Medication Instructions RQV:[id number]    Printed on:12/01/20 2211   Medication Information                      acetaminophen (TYLENOL) 325 MG tablet  Take 650 mg by mouth every 4 hours as needed for Pain             albuterol sulfate HFA (VENTOLIN HFA) 108 (90 Base) MCG/ACT inhaler  Inhale 2 puffs into the lungs every 4 hours as needed for Shortness of Breath             amLODIPine-benazepril (LOTREL) 10-40 MG per capsule  Take 1 capsule by mouth daily             ascorbic acid (VITAMIN C) 500 MG tablet  Take 500 mg by mouth three times a week Mon/Thur/Sun             atorvastatin (LIPITOR) 20 MG tablet  Take 20 mg by mouth nightly             calcium carbonate (TUMS) 500 MG chewable tablet  Take 1 tablet by mouth three times a week Tues/Thurs/Sat for GERD             carvedilol (COREG) 6.25 MG tablet  Take 1 tablet by mouth 2 times daily             citalopram (CELEXA) 10 MG tablet  TAKE ONE TABLET BY MOUTH ONCE DAILY             docusate sodium (COLACE) 100 MG capsule  Take 100 mg by mouth daily             ferrous sulfate (IRON 325) 325 (65 Fe) MG tablet  Take 325 mg by mouth daily             furosemide (LASIX) 20 MG tablet  Take 20 mg by mouth 2 times daily             Glucagon HCl, rDNA, (GLUCAGEN HYPOKIT IJ)  Inject 1 mg as directed as needed. Indications: Extremely Low Blood Sugar             glucose blood VI test strips (ONE TOUCH TEST STRIPS) strip  Test blood sugar with One Touch Ultra 2 QID and as needed.              insulin aspart (NOVOLOG) 100 UNIT/ML injection vial  Inject 4 Units into the skin 3 times daily (before meals)             insulin glargine (BASAGLAR KWIKPEN) 100 UNIT/ML injection pen  Inject 10 Units into the skin every morning             insulin glargine (BASAGLAR KWIKPEN) 100 UNIT/ML injection pen  Inject 30 Units into the skin nightly             latanoprost (XALATAN) 0.005 % ophthalmic solution  Place 1 drop into both eyes nightly             linagliptin (TRADJENTA) 5 MG tablet  Take 5 mg by mouth daily             metFORMIN (GLUCOPHAGE) 500 MG tablet  Take 500 mg by mouth 2 times daily             oxybutynin (DITROPAN-XL) 10 MG extended release tablet  TAKE ONE TABLET BY MOUTH ONCE DAILY             pantoprazole (PROTONIX) 40 MG tablet  TAKE ONE TABLET BY MOUTH ONCE DAILY             potassium chloride (KLOR-CON M20) 20 MEQ extended release tablet  Take 20 mEq by mouth daily             vitamin D (CHOLECALCIFEROL) 50 MCG (2000 UT) TABS tablet  Take 2,000 Units by mouth daily For 2 months             vitamin E 400 UNIT capsule  Take 400 Units by mouth daily             warfarin (COUMADIN) 4 MG tablet  Take 4 mg by mouth Five times weekly Mon-Fri             warfarin (COUMADIN) 5 MG tablet  Take 5 mg by mouth Twice a Week Sat & Sun                      Time Spent:- 45 minutes    Electronically signed by Evan Garrido MD on 12/1/2020 at 10:11 PM  Discharging Hospitalist

## 2020-12-04 ENCOUNTER — OUTSIDE SERVICES (OUTPATIENT)
Dept: FAMILY MEDICINE CLINIC | Age: 80
End: 2020-12-04
Payer: MEDICARE

## 2020-12-04 VITALS
WEIGHT: 199.6 LBS | TEMPERATURE: 97.6 F | OXYGEN SATURATION: 94 % | HEART RATE: 68 BPM | SYSTOLIC BLOOD PRESSURE: 136 MMHG | RESPIRATION RATE: 18 BRPM | BODY MASS INDEX: 36.51 KG/M2 | DIASTOLIC BLOOD PRESSURE: 78 MMHG

## 2020-12-04 PROCEDURE — 99490 CHRNC CARE MGMT STAFF 1ST 20: CPT | Performed by: NURSE PRACTITIONER

## 2020-12-04 PROCEDURE — 99309 SBSQ NF CARE MODERATE MDM 30: CPT | Performed by: NURSE PRACTITIONER

## 2020-12-04 NOTE — PROGRESS NOTES
cerebral infarction       Past Surgical History:   Procedure Laterality Date    CARDIAC CATHETERIZATION  2004    CHOLECYSTECTOMY  1965    COLONOSCOPY      COLONOSCOPY N/A 5/1/2019    COLONOSCOPY POLYPECTOMY SNARE/COLD BIOPSY performed by Tanesha Hernadez MD at [address] [facility]  2001    JOINT REPLACEMENT  2008    Lt Total Knee    JOINT REPLACEMENT  2009    Rt Total Knee       Family History   Problem Relation Age of Onset    Cancer Mother         kidney       Social History     Tobacco Use    Smoking status: Never Smoker    Smokeless tobacco: Never Used   Substance Use Topics    Alcohol use: No      Allergies   Allergen Reactions    Papaya Derivatives Hives    Pcn [Penicillins] Hives       There are no preventive care reminders to display for this patient. Subjective:      Review of Systems   Unable to perform ROS: Dementia       Objective:     Physical Exam  Vitals signs and nursing note reviewed. Constitutional:       General: She is not in acute distress. Appearance: She is well-developed. She is obese. She is ill-appearing. HENT:      Head: Normocephalic and atraumatic. Right Ear: External ear normal.      Left Ear: External ear normal.      Nose: Nose normal.   Eyes:      General: Visual field deficit present. No scleral icterus. Right eye: No discharge. Left eye: No discharge. Conjunctiva/sclera: Conjunctivae normal.   Neck:      Musculoskeletal: Neck supple. Thyroid: No thyromegaly. Vascular: No JVD. Cardiovascular:      Rate and Rhythm: Normal rate and regular rhythm. Heart sounds: Normal heart sounds. Pulmonary:      Effort: Pulmonary effort is normal. No respiratory distress. Breath sounds: Normal breath sounds. No stridor. No wheezing or rales. Abdominal:      General: Bowel sounds are normal. There is no distension. Palpations: Abdomen is soft. Tenderness: There is no abdominal tenderness. Musculoskeletal: Normal range of motion. General: No deformity. Right shoulder: She exhibits no tenderness, no bony tenderness and no pain. Left shoulder: She exhibits no tenderness, no bony tenderness and no pain. Cervical back: She exhibits no tenderness, no bony tenderness and no pain. Thoracic back: She exhibits no tenderness, no bony tenderness, no pain and no spasm. Lumbar back: She exhibits no tenderness, no bony tenderness and no pain. Lymphadenopathy:      Cervical: No cervical adenopathy. Upper Body:      Right upper body: No supraclavicular adenopathy. Left upper body: No supraclavicular adenopathy. Skin:     General: Skin is warm and dry. Coloration: Skin is pale. Findings: No erythema or rash. Neurological:      Mental Status: She is alert. She is disoriented. Cranial Nerves: Facial asymmetry present. Motor: Weakness present. No atrophy, abnormal muscle tone or seizure activity. Psychiatric:         Mood and Affect: Mood is anxious. Speech: Speech is tangential.         Behavior: Behavior normal. Behavior is cooperative. Cognition and Memory: Memory is impaired. /78   Pulse 68   Temp 97.6 °F (36.4 °C)   Resp 18   Wt 199 lb 9.6 oz (90.5 kg)   SpO2 94%   BMI 36.51 kg/m²     Assessment/Plan      1. Atrial fibrillation, unspecified type ([address] -chronic and continue carvedilol  and Coumadin. Continue to monitor PT/INRs.   2. Diabetes mellitus type 2 in obese (HCC) blood sugars lower. Will discontinue Basaglar and continue Metformin, Tradjenta, and monitor blood sugars. 3. Recurrent major depressive disorder, in partial remission (HCC) -chronic and mood stable today. Does smile when provider comes into her room. Denies sadness. Continue low-dose of Celexa.    4. Gastroesophageal reflux disease without esophagitis -denies GI symptoms but has had a 25 pound weight loss in the last month likely secondary to Covid related symptoms. Will decrease pantoprazole to 20 mg and monitor. 5. Primary osteoarthritis involving multiple joints -denies pain during assessment today but does complain of pain with staff. Will continue current medications for pain which include Tylenol and morphine. 6. Essential hypertension -blood pressures overall controlled with current regimen of carvedilol, Lotrel, and Lasix. Continue to monitor blood pressures. 7. History of stroke with residual deficit -continue to monitor blood pressures. Complains of shortness of breath today. Likely secondary to Covid related issues. Continue supportive measures. 8. Hospice care -continue comfort meds. Medications discontinued as appropriate today. Resident has HTN, DM, Hx of CVA, HLD, atrial fib and it is expected to last 12 or more months. These chronic conditions place resident at a significant risk of death, acute exacerbation, or functional decline. The patient's comprehensive plan was revised and monitored today. I spent 20 minutes reviewing plan. Patient seen and examined. History partially obtained by chart review and nursing notes. I have reviewed patient's past medical, surgical, social, and family history and have made updates where appropriate.   See facility EMR for updated medication list.       Electronically signed by Leopoldo Pastures, APRN - CNP on 12/4/2020 at 6:42 PM

## 2024-11-03 NOTE — PROGRESS NOTES
Clinical Pharmacy Note    Warfarin consult follow-up    Recent Labs     11/16/20  0837   INR 2.02*     Recent Labs     11/14/20  0640 11/15/20  0648 11/16/20  0836   HGB 11.1* 11.4* 11.2*   HCT 35.9* 36.5* 35.8*    235 276       Significant Drug-Drug Interactions:  New warfarin drug-drug interactions: none  Discontinued drug-drug interactions: none  Current warfarin drug-drug interactions: citalopram, dexamethasone, vitamin C      Date INR Warfarin Dose   11/10/2020 2.46 4 mg   11/11/2020 2.59 4 mg   11/12/2020 2.84 4 mg   11/13/2020 3.03 3 mg   11/14/2020 3.38 0.5 mg   11/15/2020 2.41 4 mg   11/16/2020  2.02 4 mg       Notes:                     Daily PT/INR until stable within therapeutic range.        Danyell Niño PharmD   11/16/2020, 12:38 PM Yes

## (undated) DEVICE — FORCEPS BX L240CM JAW DIA3.2MM L CAP W/ NDL MIC MESH TOOTH

## (undated) DEVICE — CONNECTOR TBNG AUX H2O JET DISP FOR OLY 160/180 SER

## (undated) DEVICE — SET LNR RED GRN W/ BASE CLEANASCOPE

## (undated) DEVICE — ENDO KIT: Brand: MEDLINE INDUSTRIES, INC.

## (undated) DEVICE — SNARE POLYP SM W13MMXL240CM SHTH DIA2.4MM OVL FLX DISP

## (undated) DEVICE — SOLUTION IV 1000ML 0.45% SOD CHL PH 5 INJ USP VIAFLX PLAS

## (undated) DEVICE — Z INACTIVE USE 2525529 CONNECTOR TBNG FOR O2

## (undated) DEVICE — PATIENT RETURN ELECTRODE, SINGLE-USE, CONTACT QUALITY MONITORING, ADULT, WITH 9FT CORD, FOR PATIENTS WEIGING OVER 33LBS. (15KG): Brand: MEGADYNE

## (undated) DEVICE — FORCEPS BX L L240CM DIA2.4MM RAD JAW 4 HOT FOR POLYP DISP

## (undated) DEVICE — TRAP POLYP ETRAP

## (undated) DEVICE — MASK O2 AD TB L7FT HI CONC PART N RBRTH W RESVR BG SFTY